# Patient Record
Sex: FEMALE | Race: WHITE | Employment: OTHER | URBAN - METROPOLITAN AREA
[De-identification: names, ages, dates, MRNs, and addresses within clinical notes are randomized per-mention and may not be internally consistent; named-entity substitution may affect disease eponyms.]

---

## 2017-01-01 ENCOUNTER — HOSPITAL ENCOUNTER (EMERGENCY)
Age: 72
Discharge: HOME | End: 2017-01-01
Attending: EMERGENCY MEDICINE
Payer: MEDICARE

## 2017-01-01 VITALS
HEIGHT: 60 IN | TEMPERATURE: 97.7 F | RESPIRATION RATE: 17 BRPM | HEART RATE: 87 BPM | DIASTOLIC BLOOD PRESSURE: 82 MMHG | BODY MASS INDEX: 27.68 KG/M2 | WEIGHT: 141 LBS | OXYGEN SATURATION: 98 % | SYSTOLIC BLOOD PRESSURE: 153 MMHG

## 2017-01-01 DIAGNOSIS — K64.4 EXTERNAL HEMORRHOID: Primary | ICD-10-CM

## 2017-01-01 LAB
ANION GAP BLD CALC-SCNC: 9 MMOL/L (ref 3–18)
BASOPHILS # BLD AUTO: 0 K/UL (ref 0–0.06)
BASOPHILS # BLD: 1 % (ref 0–2)
BUN SERPL-MCNC: 18 MG/DL (ref 7–18)
BUN/CREAT SERPL: 24 (ref 12–20)
CALCIUM SERPL-MCNC: 9.6 MG/DL (ref 8.5–10.1)
CHLORIDE SERPL-SCNC: 103 MMOL/L (ref 100–108)
CO2 SERPL-SCNC: 29 MMOL/L (ref 21–32)
CREAT SERPL-MCNC: 0.74 MG/DL (ref 0.6–1.3)
DIFFERENTIAL METHOD BLD: ABNORMAL
EOSINOPHIL # BLD: 0.2 K/UL (ref 0–0.4)
EOSINOPHIL NFR BLD: 4 % (ref 0–5)
ERYTHROCYTE [DISTWIDTH] IN BLOOD BY AUTOMATED COUNT: 12.8 % (ref 11.6–14.5)
GLUCOSE SERPL-MCNC: 88 MG/DL (ref 74–99)
HCT VFR BLD AUTO: 44.5 % (ref 35–45)
HGB BLD-MCNC: 14.6 G/DL (ref 12–16)
LYMPHOCYTES # BLD AUTO: 19 % (ref 21–52)
LYMPHOCYTES # BLD: 1 K/UL (ref 0.9–3.6)
MCH RBC QN AUTO: 30.4 PG (ref 24–34)
MCHC RBC AUTO-ENTMCNC: 32.8 G/DL (ref 31–37)
MCV RBC AUTO: 92.7 FL (ref 74–97)
MONOCYTES # BLD: 0.5 K/UL (ref 0.05–1.2)
MONOCYTES NFR BLD AUTO: 9 % (ref 3–10)
NEUTS SEG # BLD: 3.7 K/UL (ref 1.8–8)
NEUTS SEG NFR BLD AUTO: 67 % (ref 40–73)
PLATELET # BLD AUTO: 189 K/UL (ref 135–420)
PMV BLD AUTO: 10.9 FL (ref 9.2–11.8)
POTASSIUM SERPL-SCNC: 4.6 MMOL/L (ref 3.5–5.5)
RBC # BLD AUTO: 4.8 M/UL (ref 4.2–5.3)
SODIUM SERPL-SCNC: 141 MMOL/L (ref 136–145)
WBC # BLD AUTO: 5.5 K/UL (ref 4.6–13.2)

## 2017-01-01 PROCEDURE — 80048 BASIC METABOLIC PNL TOTAL CA: CPT | Performed by: EMERGENCY MEDICINE

## 2017-01-01 PROCEDURE — 99282 EMERGENCY DEPT VISIT SF MDM: CPT

## 2017-01-01 PROCEDURE — 85025 COMPLETE CBC W/AUTO DIFF WBC: CPT | Performed by: EMERGENCY MEDICINE

## 2017-01-01 NOTE — DISCHARGE INSTRUCTIONS

## 2017-01-01 NOTE — ED PROVIDER NOTES
HPI Comments: Pt has hx of similar episodes in past, diagnosed with hemorrhoids. States she has discussed surgery to remove these due to recurrent bleeding and pain with PCP. Has appointment scheduled with . Jacqui James MD  for Tuesday. Denies blood in stool. No CP, SOB, dizziness, headache, abdominal pain, NVD, fever, chills. Saroj Hauser MD      Patient is a 66 y.o. female presenting with anal bleeding. The history is provided by the patient. Rectal Bleeding    This is a recurrent problem. Episode onset: upon awakening. The stool is described as blood tinged. Pertinent negatives include no abdominal pain, no flatus, no dysuria, no abdominal distention, no chills, no fever, no nausea, no back pain, no vomiting, no diarrhea and no constipation. She has tried nothing for the symptoms. Past Medical History:   Diagnosis Date    Anxiety     Arthropathy     Atrophic vaginitis     Back pain     Chest pain, atypical     Cystocele     Depression     Flank pain     HTN (hypertension)     Hypercholesteremia     Hyperlipidemia     Leg pain     Metabolic disease     Mitral valve prolapse      with mitral regurgitation    Osteopenia     Recurrent UTI     right heel pain     Uterine prolapse     UTI (urinary tract infection)        Past Surgical History:   Procedure Laterality Date    Hx breast biopsy  ? 2010     Lt breast    Hx cataract removal Right 2014         Family History:   Problem Relation Age of Onset    Heart Disease Mother     Cancer Father     Heart Disease Brother     Heart Disease Sister        Social History     Social History    Marital status:      Spouse name: N/A    Number of children: N/A    Years of education: N/A     Occupational History    Not on file.      Social History Main Topics    Smoking status: Never Smoker    Smokeless tobacco: Never Used    Alcohol use No    Drug use: No    Sexual activity: No     Other Topics Concern    Not on file     Social History Narrative         ALLERGIES: Review of patient's allergies indicates no known allergies. Review of Systems   Constitutional: Negative for activity change, appetite change, chills, diaphoresis, fatigue and fever. HENT: Negative. Respiratory: Negative for cough and shortness of breath. Cardiovascular: Negative for chest pain, palpitations and leg swelling. Gastrointestinal: Positive for anal bleeding. Negative for abdominal distention, abdominal pain, blood in stool, constipation, diarrhea, flatus, nausea and vomiting. Genitourinary: Negative for difficulty urinating, dyspareunia, dysuria, flank pain, frequency, hematuria, menstrual problem and pelvic pain. Musculoskeletal: Negative for back pain. Neurological: Negative for light-headedness and headaches. Vitals:    01/01/17 1219   BP: 153/82   Pulse: 87   Resp: 17   Temp: 97.7 °F (36.5 °C)   SpO2: 98%   Weight: 64 kg (141 lb)   Height: 5' (1.524 m)            Physical Exam   Constitutional: She appears well-developed and well-nourished. HENT:   Head: Normocephalic and atraumatic. Normal skin turgor, cap refill, oral mucosa wet. Eyes: Conjunctivae and EOM are normal. Pupils are equal, round, and reactive to light. No scleral icterus. Neck: Normal range of motion. Neck supple. Cardiovascular: Normal rate, regular rhythm, normal heart sounds and intact distal pulses. Pulmonary/Chest: Effort normal and breath sounds normal. No respiratory distress. She has no wheezes. She has no rales. She exhibits no tenderness. Abdominal: Soft. Bowel sounds are normal. She exhibits no distension and no mass. There is no tenderness. There is no rebound and no guarding. Genitourinary: Vagina normal. Rectal exam shows external hemorrhoid. Rectal exam shows no internal hemorrhoid, no fissure, no mass, no tenderness and anal tone normal.   Genitourinary Comments: Hemoccult card negative.    Multiple external hemorrhoids appreciated, non thromboses, dried blood noted on one but no active bleeding or clots appreciated. Nursing note and vitals reviewed. MDM  Number of Diagnoses or Management Options  Diagnosis management comments: Impression: external hemorrhoids. PE shows external, non thrombosed hemorrhoids, otherwise unremarkable. No abdominal pain, no bleeding from rectum or in stool. CBC and CMP WNL. Pt is stable, vitals normal.  No symptoms at this time. Abdominal exam reassessed without change. Will d/c home. Pt has apt with PCP on Tuesday. Amount and/or Complexity of Data Reviewed  Clinical lab tests: ordered and reviewed  Tests in the medicine section of CPT®: reviewed and ordered      ED Course       Procedures           Diagnosis: No diagnosis found. Disposition: discharge home    Follow-up Information     None          Patient's Medications   Start Taking    No medications on file   Continue Taking    SERTRALINE (ZOLOFT) 100 MG TABLET    Take 50 mg by mouth daily.    These Medications have changed    No medications on file   Stop Taking    ESTRADIOL (ESTRACE) 0.01 % (0.1 MG/GRAM) VAGINAL CREAM    Use small amount on tip of finger, apply to introitus 3 times a week    OMEPRAZOLE (PRILOSEC) 20 MG CAPSULE

## 2017-01-01 NOTE — ED NOTES
Pt discharged to home ambulatory and in company of spouse  Discharge instructions provided via discussion and handout. Teaching to patient. Verbalized understanding. No questions voiced. Discharged with 0 RX.

## 2017-01-01 NOTE — ED TRIAGE NOTES
Pt c/o diarrhea followed by rectal bleeding since this morning. Hx of hemorrhoids.  \"but this time its bad\"

## 2017-05-16 ENCOUNTER — HOSPITAL ENCOUNTER (OUTPATIENT)
Dept: MAMMOGRAPHY | Age: 72
Discharge: HOME | End: 2017-05-16
Attending: INTERNAL MEDICINE
Payer: MEDICARE

## 2017-05-16 DIAGNOSIS — Z12.31 VISIT FOR SCREENING MAMMOGRAM: ICD-10-CM

## 2017-05-16 PROCEDURE — 77067 SCR MAMMO BI INCL CAD: CPT

## 2017-05-24 ENCOUNTER — HOSPITAL ENCOUNTER (OUTPATIENT)
Dept: GENERAL RADIOLOGY | Age: 72
Discharge: HOME | End: 2017-05-24
Attending: INTERNAL MEDICINE
Payer: MEDICARE

## 2017-05-24 DIAGNOSIS — Z13.820 OSTEOPOROSIS SCREENING: ICD-10-CM

## 2017-05-24 PROCEDURE — 77080 DXA BONE DENSITY AXIAL: CPT

## 2018-06-05 ENCOUNTER — HOSPITAL ENCOUNTER (OUTPATIENT)
Dept: MAMMOGRAPHY | Age: 73
Discharge: HOME | End: 2018-06-05
Attending: INTERNAL MEDICINE
Payer: MEDICARE

## 2018-06-05 DIAGNOSIS — Z12.31 VISIT FOR SCREENING MAMMOGRAM: ICD-10-CM

## 2018-06-05 PROCEDURE — 77063 BREAST TOMOSYNTHESIS BI: CPT

## 2018-09-10 ENCOUNTER — HOSPITAL ENCOUNTER (OUTPATIENT)
Dept: LAB | Age: 73
Discharge: HOME | End: 2018-09-10
Payer: MEDICARE

## 2018-09-10 ENCOUNTER — OFFICE VISIT (OUTPATIENT)
Dept: FAMILY MEDICINE CLINIC | Age: 73
End: 2018-09-10

## 2018-09-10 VITALS
HEART RATE: 81 BPM | HEIGHT: 61 IN | RESPIRATION RATE: 20 BRPM | BODY MASS INDEX: 26.39 KG/M2 | DIASTOLIC BLOOD PRESSURE: 77 MMHG | WEIGHT: 139.8 LBS | TEMPERATURE: 98 F | SYSTOLIC BLOOD PRESSURE: 135 MMHG | OXYGEN SATURATION: 95 %

## 2018-09-10 DIAGNOSIS — F41.9 ANXIETY: ICD-10-CM

## 2018-09-10 DIAGNOSIS — F41.9 ANXIETY: Primary | ICD-10-CM

## 2018-09-10 DIAGNOSIS — N81.11 CYSTOCELE, MIDLINE: ICD-10-CM

## 2018-09-10 DIAGNOSIS — E78.00 HYPERCHOLESTEREMIA: ICD-10-CM

## 2018-09-10 DIAGNOSIS — N39.0 UTI (URINARY TRACT INFECTION), UNCOMPLICATED: ICD-10-CM

## 2018-09-10 DIAGNOSIS — F33.9 RECURRENT DEPRESSION (HCC): ICD-10-CM

## 2018-09-10 DIAGNOSIS — R39.9 UTI SYMPTOMS: ICD-10-CM

## 2018-09-10 DIAGNOSIS — N39.0 RECURRENT UTI: ICD-10-CM

## 2018-09-10 LAB
ALBUMIN SERPL-MCNC: 4.1 G/DL (ref 3.4–5)
ALBUMIN/GLOB SERPL: 1 {RATIO} (ref 0.8–1.7)
ALP SERPL-CCNC: 92 U/L (ref 45–117)
ALT SERPL-CCNC: 27 U/L (ref 13–56)
ANION GAP SERPL CALC-SCNC: 8 MMOL/L (ref 3–18)
AST SERPL-CCNC: 18 U/L (ref 15–37)
BASOPHILS # BLD: 0 K/UL (ref 0–0.1)
BASOPHILS NFR BLD: 1 % (ref 0–2)
BILIRUB SERPL-MCNC: 1.6 MG/DL (ref 0.2–1)
BILIRUB UR QL STRIP: NORMAL
BUN SERPL-MCNC: 18 MG/DL (ref 7–18)
BUN/CREAT SERPL: 22 (ref 12–20)
CALCIUM SERPL-MCNC: 9.2 MG/DL (ref 8.5–10.1)
CHLORIDE SERPL-SCNC: 105 MMOL/L (ref 100–108)
CHOLEST SERPL-MCNC: 259 MG/DL
CO2 SERPL-SCNC: 29 MMOL/L (ref 21–32)
CREAT SERPL-MCNC: 0.82 MG/DL (ref 0.6–1.3)
DIFFERENTIAL METHOD BLD: ABNORMAL
EOSINOPHIL # BLD: 0.3 K/UL (ref 0–0.4)
EOSINOPHIL NFR BLD: 4 % (ref 0–5)
ERYTHROCYTE [DISTWIDTH] IN BLOOD BY AUTOMATED COUNT: 13.9 % (ref 11.6–14.5)
GLOBULIN SER CALC-MCNC: 4 G/DL (ref 2–4)
GLUCOSE SERPL-MCNC: 86 MG/DL (ref 74–99)
GLUCOSE UR-MCNC: NEGATIVE MG/DL
HCT VFR BLD AUTO: 45.9 % (ref 35–45)
HDLC SERPL-MCNC: 79 MG/DL (ref 40–60)
HDLC SERPL: 3.3 {RATIO} (ref 0–5)
HGB BLD-MCNC: 14.8 G/DL (ref 12–16)
KETONES P FAST UR STRIP-MCNC: NORMAL MG/DL
LDLC SERPL CALC-MCNC: 153.8 MG/DL (ref 0–100)
LIPID PROFILE,FLP: ABNORMAL
LYMPHOCYTES # BLD: 1.3 K/UL (ref 0.9–3.6)
LYMPHOCYTES NFR BLD: 20 % (ref 21–52)
MCH RBC QN AUTO: 30.1 PG (ref 24–34)
MCHC RBC AUTO-ENTMCNC: 32.2 G/DL (ref 31–37)
MCV RBC AUTO: 93.5 FL (ref 74–97)
MONOCYTES # BLD: 0.6 K/UL (ref 0.05–1.2)
MONOCYTES NFR BLD: 8 % (ref 3–10)
NEUTS SEG # BLD: 4.5 K/UL (ref 1.8–8)
NEUTS SEG NFR BLD: 67 % (ref 40–73)
PH UR STRIP: 6.5 [PH] (ref 4.6–8)
PLATELET # BLD AUTO: 234 K/UL (ref 135–420)
PMV BLD AUTO: 11.4 FL (ref 9.2–11.8)
POTASSIUM SERPL-SCNC: 4.7 MMOL/L (ref 3.5–5.5)
PROT SERPL-MCNC: 8.1 G/DL (ref 6.4–8.2)
PROT UR QL STRIP: NORMAL
RBC # BLD AUTO: 4.91 M/UL (ref 4.2–5.3)
SODIUM SERPL-SCNC: 142 MMOL/L (ref 136–145)
SP GR UR STRIP: 1.02 (ref 1–1.03)
TRIGL SERPL-MCNC: 131 MG/DL (ref ?–150)
TSH SERPL DL<=0.05 MIU/L-ACNC: 0.85 UIU/ML (ref 0.36–3.74)
UA UROBILINOGEN AMB POC: NORMAL (ref 0.2–1)
URINALYSIS CLARITY POC: NORMAL
URINALYSIS COLOR POC: YELLOW
URINE BLOOD POC: NORMAL
URINE LEUKOCYTES POC: NORMAL
URINE NITRITES POC: NEGATIVE
VLDLC SERPL CALC-MCNC: 26.2 MG/DL
WBC # BLD AUTO: 6.7 K/UL (ref 4.6–13.2)

## 2018-09-10 PROCEDURE — 85025 COMPLETE CBC W/AUTO DIFF WBC: CPT | Performed by: INTERNAL MEDICINE

## 2018-09-10 PROCEDURE — 80053 COMPREHEN METABOLIC PANEL: CPT | Performed by: INTERNAL MEDICINE

## 2018-09-10 PROCEDURE — 87086 URINE CULTURE/COLONY COUNT: CPT | Performed by: INTERNAL MEDICINE

## 2018-09-10 PROCEDURE — 80061 LIPID PANEL: CPT | Performed by: INTERNAL MEDICINE

## 2018-09-10 PROCEDURE — 36415 COLL VENOUS BLD VENIPUNCTURE: CPT | Performed by: INTERNAL MEDICINE

## 2018-09-10 PROCEDURE — 84443 ASSAY THYROID STIM HORMONE: CPT | Performed by: INTERNAL MEDICINE

## 2018-09-10 RX ORDER — CIPROFLOXACIN 250 MG/1
250 TABLET, FILM COATED ORAL EVERY 12 HOURS
Qty: 10 TAB | Refills: 0 | Status: SHIPPED | OUTPATIENT
Start: 2018-09-10 | End: 2018-09-15

## 2018-09-10 NOTE — PROGRESS NOTES
1. Have you been to the ER, urgent care clinic since your last visit? Hospitalized since your last visit? No 
 
2. Have you seen or consulted any other health care providers outside of the [address] [place] [address] since your last visit? Include any pap smears or colon screening.  No

## 2018-09-10 NOTE — MR AVS SNAPSHOT
[facility] 
 
 
 [address] [facility] [address] [facility] [address] [address] 
[phone] Patient: Derrell Eastman MRN: [id number] ZSZ:[id number] Visit Information Date & Time Provider Department Dept. Phone Encounter #  
 9/10/2018 12:30 PM Aiden Kruger, [address] [facility] [phone] [id number] Follow-up Instructions Return in about 3 months (around 12/10/2018) for rov. Your Appointments 9/27/2018 11:00 AM  
ESTABLISHED PATIENT with Power Shelton NP [facility] ([address]) Appt Note: Return in about 4 months (around 9/30/2018) for with NP [place] for pessary change. [address] [place] [id number]  
[phone]  
  
   
 [facility] [id number] Upcoming Health Maintenance Date Due DTaP/Tdap/Td series (1 - Tdap) 8/5/1964 ZOSTER VACCINE AGE 60> 6/5/2003 GLAUCOMA SCREENING Q2Y 8/5/2008 Pneumococcal 65+ Low/Medium Risk (1 of 2 - PCV13) 8/5/2008 MEDICARE YEARLY EXAM 3/14/2018 Influenza Age 5 to Adult 8/1/2018 Allergies as of 9/10/2018  Review Complete On: 9/10/2018 By: Aiden Kruger MD  
  
 Severity Noted Reaction Type Reactions Nitrofurantoin Medium 10/03/2016    Hives, Rash Current Immunizations  Never Reviewed No immunizations on file. Not reviewed this visit You Were Diagnosed With   
  
 Codes Comments Anxiety    -  Primary ICD-10-CM: F41.9 ICD-9-CM: 300.00 Recurrent UTI     ICD-10-CM: N39.0 ICD-9-CM: 599.0 Cystocele, midline     ICD-10-CM: N81.11 
ICD-9-CM: 618.01   
 UTI symptoms     ICD-10-CM: R39.9 ICD-9-CM: 788.99   
 UTI (urinary tract infection), uncomplicated     ELENA-38-EU: N39.0 ICD-9-CM: 599.0 Recurrent depression ([address]     ICD-10-CM: F33.9 ICD-9-CM: 296.30 Hypercholesteremia     ICD-10-CM: E78.00 ICD-9-CM: 272.0 Vitals BP Pulse Temp Resp Height(growth percentile) Weight(growth percentile) 135/77 81 98 °F (36.7 °C) (Oral) 20 5' 0.5\" (1.537 m) 139 lb 12.8 oz (63.4 kg) SpO2 BMI OB Status Smoking Status 95% 26.85 kg/m2 Postmenopausal Never Smoker BMI and BSA Data Body Mass Index Body Surface Area  
 26.85 kg/m 2 1.65 m 2 Preferred Pharmacy Pharmacy Name Phone [facility] [address] [place] [address]. [address] [phone] Your Updated Medication List  
  
   
This list is accurate as of 9/10/18  1:06 PM.  Always use your most recent med list.  
  
  
  
  
 ALPRAZolam 0.25 mg tablet Commonly known as:  [place] Take  by mouth. Cholecalciferol (Vitamin D3) 2,000 unit Cap capsule Commonly known as:  VITAMIN D3 Take  by mouth. ciprofloxacin HCl 250 mg tablet Commonly known as:  CIPRO Take 1 Tab by mouth every twelve (12) hours for 5 days. CLARITIN 10 mg tablet Generic drug:  loratadine Take  by mouth. CRANBERRY PO Take  by Mouth.  
  
 cyanocobalamin 1,000 mcg tablet Take  by mouth. fluticasone 50 mcg/actuation nasal spray Commonly known as:  FLONASE  
by Nasal route. ZOLOFT 100 mg tablet Generic drug:  sertraline Take 50 mg by mouth daily. Prescriptions Sent to Pharmacy Refills  
 ciprofloxacin HCl (CIPRO) 250 mg tablet 0 Sig: Take 1 Tab by mouth every twelve (12) hours for 5 days. Class: Normal  
 Pharmacy: [facility] Ph #: [phone] Route: Oral  
  
We Performed the Following AMB POC URINALYSIS DIP STICK AUTO W/O MICRO [24887 CPT(R)] Follow-up Instructions Return in about 3 months (around 12/10/2018) for rov. To-Do List   
 09/10/2018 Lab:  CBC WITH AUTOMATED DIFF   
  
 09/10/2018 Microbiology:  CULTURE, URINE   
  
 09/10/2018 Lab: LIPID PANEL   
  
 09/10/2018 Lab:  METABOLIC PANEL, COMPREHENSIVE   
  
 09/10/2018 Lab:  TSH 3RD GENERATION Introducing [facility] SERVICES! Dear Chavez Stanford: 
Thank you for requesting a [email or web address] account. Our records indicate that you already have an active [email or web address] account. You can access your account anytime at [email or web address] Did you know that you can access your hospital and ER discharge instructions at any time in [email or web address]? You can also review all of your test results from your hospital stay or ER visit. Additional Information If you have questions, please visit the Frequently Asked Questions section of the [email or web address] website at [email or web address] Remember, [email or web address] is NOT to be used for urgent needs. For medical emergencies, dial 911. Now available from your iPhone and Android! Please provide this summary of care documentation to your next provider. Your primary care clinician is listed as Ashanti Cedillo. If you have any questions after today's visit, please call [phone].

## 2018-09-11 DIAGNOSIS — E78.00 HYPERCHOLESTEREMIA: Primary | ICD-10-CM

## 2018-09-11 RX ORDER — ATORVASTATIN CALCIUM 10 MG/1
10 TABLET, FILM COATED ORAL
Qty: 90 TAB | Refills: 0 | Status: SHIPPED | OUTPATIENT
Start: 2018-09-11 | End: 2018-12-11 | Stop reason: SINTOL

## 2018-09-12 LAB
BACTERIA SPEC CULT: NORMAL
SERVICE CMNT-IMP: NORMAL

## 2018-11-27 DIAGNOSIS — F41.9 ANXIETY: Primary | ICD-10-CM

## 2018-11-27 RX ORDER — ALPRAZOLAM 0.25 MG/1
0.25 TABLET ORAL
Qty: 60 TAB | Refills: 0 | Status: SHIPPED | OUTPATIENT
Start: 2018-11-27 | End: 2019-03-05 | Stop reason: SDUPTHER

## 2018-12-11 ENCOUNTER — OFFICE VISIT (OUTPATIENT)
Dept: FAMILY MEDICINE CLINIC | Age: 73
End: 2018-12-11

## 2018-12-11 VITALS
RESPIRATION RATE: 20 BRPM | OXYGEN SATURATION: 96 % | BODY MASS INDEX: 23.53 KG/M2 | DIASTOLIC BLOOD PRESSURE: 78 MMHG | SYSTOLIC BLOOD PRESSURE: 135 MMHG | HEIGHT: 65 IN | HEART RATE: 72 BPM | TEMPERATURE: 98 F | WEIGHT: 141.2 LBS

## 2018-12-11 DIAGNOSIS — J30.89 NON-SEASONAL ALLERGIC RHINITIS, UNSPECIFIED TRIGGER: ICD-10-CM

## 2018-12-11 DIAGNOSIS — E78.00 HYPERCHOLESTEREMIA: ICD-10-CM

## 2018-12-11 DIAGNOSIS — F41.9 ANXIETY: Primary | ICD-10-CM

## 2018-12-11 DIAGNOSIS — R30.0 DYSURIA: ICD-10-CM

## 2018-12-11 LAB
BILIRUB UR QL STRIP: NEGATIVE
GLUCOSE UR-MCNC: NEGATIVE MG/DL
KETONES P FAST UR STRIP-MCNC: NEGATIVE MG/DL
PH UR STRIP: 5.5 [PH] (ref 4.6–8)
PROT UR QL STRIP: NEGATIVE
SP GR UR STRIP: 1.02 (ref 1–1.03)
UA UROBILINOGEN AMB POC: NORMAL (ref 0.2–1)
URINALYSIS CLARITY POC: CLEAR
URINALYSIS COLOR POC: YELLOW
URINE BLOOD POC: NORMAL
URINE LEUKOCYTES POC: NORMAL
URINE NITRITES POC: NEGATIVE

## 2018-12-11 RX ORDER — EZETIMIBE 10 MG/1
10 TABLET ORAL DAILY
Qty: 90 TAB | Refills: 1 | Status: SHIPPED | OUTPATIENT
Start: 2018-12-11 | End: 2019-06-11 | Stop reason: SDUPTHER

## 2018-12-11 RX ORDER — AZELASTINE 1 MG/ML
1 SPRAY, METERED NASAL 2 TIMES DAILY
Qty: 1 BOTTLE | Refills: 0 | Status: SHIPPED | OUTPATIENT
Start: 2018-12-11 | End: 2021-08-17

## 2018-12-11 NOTE — PROGRESS NOTES
1. Have you been to the ER, urgent care clinic since your last visit? Hospitalized since your last visit? No 
 
2. Have you seen or consulted any other health care providers outside of the [address] [place] [address] since your last visit? Include any pap smears or colon screening.  No

## 2018-12-11 NOTE — PROGRESS NOTES
History of Present Illness Ashlyn Copeland is a 74 y.o. female who presents today for management of 
 
Chief Complaint Patient presents with  Cholesterol Problem  Anxiety  Urinary Hesitancy Cardiovascular Review: 
The patient has hyperlipidemia. Diet and Lifestyle: generally follows a low fat low cholesterol diet, generally follows a low sodium diet, sedentary, nonsmoker Home BP Monitoring: is not measured at home. Pertinent ROS: taking medications as instructed, no medication side effects noted, no TIA's, no dyspnea on exertion, no swelling of ankles. Anxiety Review: 
Patient is seen for anxiety disorder. Current treatment includes Zoloft, Xanax and no other therapies. Ongoing symptoms include: none. Patient denies: palpitations, sweating, chest pain, shortness of breath, dizziness, insomnia, racing thoughts. Reported side effects from the treatment: none. Patient complains of vulvar irritation and discomfort. She was using a pessary which accidentally came out last week. She also complains of nasal congestion and sneezing for one week. It is associated with itchy, watery eyes and facial pressure. She has been taking OTC loratadine with no relief. Problem List 
Patient Active Problem List  
 Diagnosis Date Noted  Hypercholesteremia 09/11/2018  Cystocele, midline 09/10/2018  Recurrent depression ([address] 09/10/2018  Uterine prolapse  Recurrent UTI  Anxiety Past Medical History Past Medical History:  
Diagnosis Date  Allergic rhinitis  Anxiety  Arthropathy  Atrophic vaginitis  Back pain  Chest pain, atypical   
 Cystocele  Depression  Flank pain  HTN (hypertension)  Hypercholesteremia  Hyperlipidemia  Leg pain  Menopause  Metabolic disease  Mitral valve prolapse   
 with mitral regurgitation  Osteopenia  Recurrent UTI  right heel pain  Uterine prolapse  UTI (urinary tract infection) Surgical History Past Surgical History:  
Procedure Laterality Date  HX BREAST BIOPSY Left ? 2010 Benign  HX CATARACT REMOVAL Right 2014 Current Medications Current Outpatient Medications Medication Sig  
 ezetimibe (ZETIA) 10 mg tablet Take 1 Tab by mouth daily.  azelastine (ASTELIN) 137 mcg (0.1 %) nasal spray 1 [place] by Both Nostrils route two (2) times a day. Use in each nostril as directed  ALPRAZolam (XANAX) 0.25 mg tablet Take 1 Tab by mouth daily as needed for Anxiety.  cyanocobalamin 1,000 mcg tablet Take  by mouth.  fluticasone (FLONASE) 50 mcg/actuation nasal spray by Nasal route.  loratadine (CLARITIN) 10 mg tablet Take  by mouth.  Cholecalciferol, Vitamin D3, (VITAMIN D3) 2,000 unit cap capsule Take  by mouth.  cranberry fruit extract (CRANBERRY PO) Take  by Mouth.  sertraline (ZOLOFT) 100 mg tablet Take 50 mg by mouth daily. No current facility-administered medications for this visit. Allergies/Drug Reactions Allergies Allergen Reactions  Nitrofurantoin Hives and Rash  Atorvastatin Myalgia Family History Family History Problem Relation Age of Onset  Heart Disease Mother  Cancer Father  Heart Disease Brother  Heart Disease Sister Social History Social History Socioeconomic History  Marital status:  Spouse name: Not on file  Number of children: Not on file  Years of education: Not on file  Highest education level: Not on file Social Needs  Financial resource strain: Not on file  Food insecurity - worry: Not on file  Food insecurity - inability: Not on file  Transportation needs - medical: Not on file  Transportation needs - non-medical: Not on file Occupational History  Not on file Tobacco Use  Smoking status: Never Smoker  Smokeless tobacco: Never Used Substance and Sexual Activity  Alcohol use:  No  
 Alcohol/week: 0.0 oz  Drug use: No  
 Sexual activity: No  
Other Topics Concern  Not on file Social History Narrative  Not on file Review of Systems Negative except as mentioned in HPI Physical Exam 
Vital signs:  
Vitals:  
 12/11/18 1109 BP: 135/78 Pulse: 72 Resp: 20 Temp: 98 °F (36.7 °C) TempSrc: Oral  
SpO2: 96% Weight: 141 lb 3.2 oz (64 kg) Height: 5' 5\" (1.651 m) General: alert, oriented, not in distress Eyes: clear conjunctivae, anicteric sclerae, full and equal ROMs Chest/Lungs: clear breath sounds, no wheezing or crackles Heart: normal rate, regular rhythm, no murmur Extremities: no focal deformities, no edema Neuro: AAOx3, CN's grossly intact Skin: no visible abnormalities Laboratory/Tests: 
Component Latest Ref Rng & Units 9/10/2018 9/10/2018 9/10/2018 9/10/2018  
 
      1:12 PM  1:12 PM  1:12 PM  1:12 PM  
WBC 
    4.6 - 13.2 K/uL    6.7  
RBC 
    4.20 - 5.30 M/uL    4.91  
HGB 12.0 - 16.0 g/dL    14.8 HCT 
    35.0 - 45.0 %    45.9 (H) MCV 
    74.0 - 97.0 FL    93.5 MCH 
    24.0 - 34.0 PG    30.1 MCHC 31.0 - 37.0 g/dL    32.2 RDW 
    11.6 - 14.5 %    13.9 PLATELET 
    865 - 294 K/uL    234 MPV 
    9.2 - 11.8 FL    11.4 NEUTROPHILS 
    40 - 73 %    67 LYMPHOCYTES 
    21 - 52 %    20 (L) MONOCYTES 
    3 - 10 %    8 EOSINOPHILS 
    0 - 5 %    4 BASOPHILS 
    0 - 2 %    1  
ABS. NEUTROPHILS 
    1.8 - 8.0 K/UL    4.5  
ABS. LYMPHOCYTES 
    0.9 - 3.6 K/UL    1.3  
ABS. MONOCYTES 
    0.05 - 1.2 K/UL    0.6  
ABS. EOSINOPHILS 
    0.0 - 0.4 K/UL    0.3  
ABS. BASOPHILS 
    0.0 - 0.1 K/UL    0.0  
DF AUTOMATED Sodium 136 - 145 mmol/L  142 Potassium 3.5 - 5.5 mmol/L  4.7 Chloride 100 - 108 mmol/L  105 CO2 
    21 - 32 mmol/L  29 Anion gap 3.0 - 18 mmol/L  8 Glucose 74 - 99 mg/dL  86 BUN 
    7.0 - 18 MG/DL  18 Creatinine 0.6 - 1.3 MG/DL  0.82    
BUN/Creatinine ratio 12 - 20    22 (H) GFR est AA 
    >60 ml/min/1.73m2  >60    
GFR est non-AA 
    >60 ml/min/1.73m2  >60 Calcium 8.5 - 10.1 MG/DL  9.2 Bilirubin, total 
    0.2 - 1.0 MG/DL  1.6 (H) ALT (SGPT) 13 - 56 U/L  27 AST 
    15 - 37 U/L  18 Alk. phosphatase 45 - 117 U/L  92 Protein, total 
    6.4 - 8.2 g/dL  8.1 Albumin 3.4 - 5.0 g/dL  4.1 Globulin 2.0 - 4.0 g/dL  4.0 A-G Ratio 
    0.8 - 1.7    1.0 Cholesterol, total 
    <200 MG/DL   259 (H) Triglyceride 
    <150 MG/DL   131 HDL Cholesterol 40 - 60 MG/DL   79 (H) LDL, calculated 0 - 100 MG/DL   153.8 (H) VLDL, calculated MG/DL   26.2 CHOL/HDL Ratio 0 - 5.0     3.3 TSH 
    0.36 - 3.74 uIU/mL 0.85 Assessment/Plan: 1. Hypercholesterolemia - Patient developed myalgia while taking atorvastatin - switch to Zetia 
  
2. Anxiety 
- stable 
- continue Xanax as needed 
  
3. Dysuria, h/o Recurrent UTI 
- POC Urinalysis - trace blood, trace leucocytes 
  
4. Cystocele, midline 
- urogynecology follow-up 
  
5. Recurrent depression ([address] 
- stable 
- continue Zoloft 6. Allergic rhinitis 
- may take loratadine 10mg BID 
- asteline nasal spray 
- continue Flonase Follow-up Disposition: 
Return in about 6 months (around 6/11/2019) for rov. I have discussed the diagnosis with the patient and the intended plan as seen in the above orders. The patient has received an after-visit summary and questions were answered concerning future plans. I have discussed medication side effects and warnings with the patient as well. I have reviewed the plan of care with the patient, accepted their input and they are in agreement with the treatment goals. Rula Chirinos MD 
December 11, 2018

## 2019-02-19 ENCOUNTER — OFFICE VISIT (OUTPATIENT)
Dept: FAMILY MEDICINE CLINIC | Age: 74
End: 2019-02-19

## 2019-02-19 VITALS
RESPIRATION RATE: 20 BRPM | BODY MASS INDEX: 23.89 KG/M2 | WEIGHT: 143.4 LBS | OXYGEN SATURATION: 98 % | HEIGHT: 65 IN | TEMPERATURE: 97.5 F | DIASTOLIC BLOOD PRESSURE: 74 MMHG | HEART RATE: 72 BPM | SYSTOLIC BLOOD PRESSURE: 134 MMHG

## 2019-02-19 DIAGNOSIS — M54.6 ACUTE LEFT-SIDED THORACIC BACK PAIN: Primary | ICD-10-CM

## 2019-02-19 RX ORDER — LIDOCAINE 50 MG/G
PATCH TOPICAL
Qty: 20 EACH | Refills: 0 | Status: SHIPPED | OUTPATIENT
Start: 2019-02-19 | End: 2021-08-17

## 2019-02-19 NOTE — PROGRESS NOTES
1. Have you been to the ER, urgent care clinic since your last visit? Hospitalized since your last visit? No 
 
2. Have you seen or consulted any other health care providers outside of the [address] [place] [address] since your last visit? Include any pap smears or colon screening.  No

## 2019-02-19 NOTE — PROGRESS NOTES
History of Present Illness Adrienne Rascon is a 74 y.o. female who presents today for management of 
 
Chief Complaint Patient presents with  Back Pain Chest Wall Pain Patient presents for presents evaluation of chest wall pain. Pain is localized to bilateral costophrenic angles radiating to the left chest, left shoulder and left thoracic back. Onset was 2 weeks ago. Pain description: character of chest pain: aching 
severity = fairly severe 
course since onset: waxing and waning but worse overall 
denies shortness of breath, hemoptysis and anginal type chest pain. Mechanism of injury: none known. Previous visits for this problem: none. Evaluation to date: none  Treatment to date: Tylenol 250mg with minimal relief. Problem List 
Patient Active Problem List  
 Diagnosis Date Noted  Hypercholesteremia 09/11/2018  Cystocele, midline 09/10/2018  Recurrent depression [address] [address] 09/10/2018  Uterine prolapse  Recurrent UTI  Anxiety Past Medical History Past Medical History:  
Diagnosis Date  Allergic rhinitis  Anxiety  Arthropathy  Atrophic vaginitis  Back pain  Chest pain, atypical   
 Cystocele  Depression  Flank pain  HTN (hypertension)  Hypercholesteremia  Hyperlipidemia  Leg pain  Menopause  Metabolic disease  Mitral valve prolapse   
 with mitral regurgitation  Osteopenia  Recurrent UTI  right heel pain  Uterine prolapse  UTI (urinary tract infection) Surgical History Past Surgical History:  
Procedure Laterality Date  HX BREAST BIOPSY Left ? 2010 Benign  HX CATARACT REMOVAL Right 2014 Current Medications Current Outpatient Medications Medication Sig  
 lidocaine (LIDODERM) 5 % Apply patch to the affected area for 12 hours a day and remove for 12 hours a day.  ezetimibe (ZETIA) 10 mg tablet Take 1 Tab by mouth daily.  azelastine (ASTELIN) 137 mcg (0.1 %) nasal spray 1 [place] by Both Nostrils route two (2) times a day. Use in each nostril as directed  ALPRAZolam (XANAX) 0.25 mg tablet Take 1 Tab by mouth daily as needed for Anxiety.  cyanocobalamin 1,000 mcg tablet Take  by mouth.  fluticasone (FLONASE) 50 mcg/actuation nasal spray by Nasal route.  loratadine (CLARITIN) 10 mg tablet Take  by mouth.  Cholecalciferol, Vitamin D3, (VITAMIN D3) 2,000 unit cap capsule Take  by mouth.  cranberry fruit extract (CRANBERRY PO) Take  by Mouth.  sertraline (ZOLOFT) 100 mg tablet Take 50 mg by mouth daily. No current facility-administered medications for this visit. Allergies/Drug Reactions Allergies Allergen Reactions  Nitrofurantoin Hives and Rash  Atorvastatin Myalgia Family History Family History Problem Relation Age of Onset  Heart Disease Mother  Cancer Father  Heart Disease Brother  Heart Disease Sister Social History Social History Socioeconomic History  Marital status:  Spouse name: Not on file  Number of children: Not on file  Years of education: Not on file  Highest education level: Not on file Social Needs  Financial resource strain: Not on file  Food insecurity - worry: Not on file  Food insecurity - inability: Not on file  Transportation needs - medical: Not on file  Transportation needs - non-medical: Not on file Occupational History  Not on file Tobacco Use  Smoking status: Never Smoker  Smokeless tobacco: Never Used Substance and Sexual Activity  Alcohol use: No  
  Alcohol/week: 0.0 oz  Drug use: No  
 Sexual activity: No  
Other Topics Concern  Not on file Social History Narrative  Not on file Review of Systems Negative except as mentioned in HPI Physical Exam 
Vital signs:  
Vitals:  
 02/19/19 1041 BP: 134/74 Pulse: 72 Resp: 20 Temp: 97.5 °F (36.4 °C) TempSrc: Oral  
SpO2: 98% Weight: 143 lb 6.4 oz (65 kg) Height: 5' 5\" (1.651 m) General: alert, oriented, not in distress Eyes: clear conjunctivae, anicteric sclerae, full and equal ROMs Chest/Lungs: clear breath sounds, no wheezing or crackles, no chest wall tenderness Heart: normal rate, regular rhythm, no murmur Extremities: no focal deformities, no edema Cervical: Neck is midline. Normal muscle tone. No focal atrophy is noted. ROM pain free. Thoracic: No rash, ecchymosis, or gross obliquity. No fasciculations. No focal atrophy is noted. (+) tenderness to palpation over left scapular area Musculoskeletal: strength 5/5 on both upper and lower extremities. Sensation in the bilateral arms grossly intact to light touch. Sensation in the bilateral legs grossly intact to light touch. Neuro: AAOx3, CN's grossly intact Skin: no visible abnormalities Assessment/Plan: ICD-10-CM ICD-9-CM 1. Acute left-sided thoracic back pain M54.6 724.1 lidocaine (LIDODERM) 5 % Home exercises Tylenol 500-1000mg Q8 PRN for pain Avoid strenuous exercises Heat Follow-up Disposition: 
Return if symptoms worsen or fail to improve. I have discussed the diagnosis with the patient and the intended plan as seen in the above orders. The patient has received an after-visit summary and questions were answered concerning future plans. I have discussed medication side effects and warnings with the patient as well. I have reviewed the plan of care with the patient, accepted their input and they are in agreement with the treatment goals. Ruperto Cardoza MD 
February 19, 2019

## 2019-02-19 NOTE — PATIENT INSTRUCTIONS
Shoulder Blade: Exercises Your Care Instructions Here are some examples of typical exercises for your condition. Start each exercise slowly. Ease off the exercise if you start to have pain. Your doctor or physical therapist will tell you when you can start these exercises and which ones will work best for you. How to do the exercises Shoulder roll 1. Stand tall with your chin slightly tucked. Imagine that a string at the top of your head is pulling you straight up. 2. Keep your arms relaxed. All motion will be in your shoulders. 3. Shrug your shoulders up toward your ears, then up and back. [place] your shoulders down and back, like you're sliding your hands down into your back pants pockets. 4. Repeat the circles at least 2 to 4 times. 5. This exercise is also helpful anytime you want to relax. Lower neck and upper back stretch 1. With your arms about shoulder height, clasp your hands in front of you. 2. Drop your chin toward your chest. 
3. Reach straight forward so you are rounding your upper back. Think about pulling your shoulder blades apart. Mayra Urban feel a stretch across your upper back and shoulders. Hold for at least 6 seconds. 4. Repeat 2 to 4 times. Triceps stretch 1. Reach your arm straight up. 2. Keeping your elbow in place, bend your arm and reach your hand down behind your back. 3. With your other hand, apply gentle pressure to the bent elbow. Mayra Urban feel a stretch at the back of your upper arm and shoulder. Hold about 6 seconds. 4. Repeat 2 to 4 times with each arm. Shoulder stretch 1. Relax your shoulders. 2. Raise one arm to shoulder height, and reach it across your chest. 
3. Pull the arm slightly toward you with your other arm. This will help you get a gentle stretch. Hold for about 6 seconds. 4. Repeat 2 to 4 times. Shoulder blade squeeze 1. Sit or stand up tall with your arms at your sides. 2. Keep your shoulders relaxed and down, not shrugged. 3. Squeeze your shoulder blades together. Hold for 6 seconds, then relax. 4. Repeat 8 to 12 times. Straight-arm shoulder blade squeeze 1. Sit or stand tall. Relax your shoulders. 2. With palms down, hold your elastic tubing or band straight out in front of you. 3. Start with slight tension in the tubing or band, with your hands about shoulder-width apart. 4. Slowly pull straight out to the sides, squeezing your shoulder blades together. Keep your arms straight and at shoulder height. Slowly release. 5. Repeat 8 to 12 times. Rowing 1. [place] your elastic tubing or band at about waist height. Take one end in each hand. 2. Sit or stand with your feet hip-width apart. 3. Hold your arms straight in front of you. Adjust your distance to create slight tension in the tubing or band. 4. Slightly tuck your chin. Relax your shoulders. 5. Without shrugging your shoulders, pull straight back. Your elbows will pass alongside your waist. 
 
Pull-downs 1. [place] your elastic tubing or band in the top of a closed door. Take one end in each hand. 2. Either sit or stand, depending on what is more comfortable. If you feel unsteady, sit on a chair. 3. Start with your arms up and comfortably apart, elbows straight. There should be a slight tension in the tubing or band. 4. Slightly tuck your chin, and look straight ahead. 5. Keeping your back straight, slowly pull down and back, bending your elbows. 6. Stop where your hands are level with your chin, in a \"goalpost\" position. 7. Repeat 8 to 12 times. Chest T stretch 1. Lie on your back. Raise your knees so they are bent. Plant your feet on the floor, hip-width apart. 2. Tuck your chin, and relax your shoulders. 3. Reach your arms straight out to the sides. If you don't feel a mild stretch in your shoulders and across your chest, use a foam roll or a tightly rolled blanket under your spine, from your tailbone to your head. 4. Relax in this position for at least 15 to 30 seconds while you breathe normally. Repeat 2 to 4 times. 5. As you get used to this stretch, keep adding a little more time until you are able relax in this position for 2 or 3 minutes. When you can relax for at least 2 minutes, you only need to do the exercise 1 time per session. Chest goalpost stretch 1. Lie on your back. Raise your knees so they are bent. Plant your feet on the floor, hip-width apart. 2. Tuck your chin, and relax your shoulders. 3. Reach your arms straight out to the sides. 4. Bend your arms at the elbows, with your hands pointed toward the top of your head. Your arms should make an L on either side of your head. Your palms should be facing up. 5. If you don't feel a mild stretch in your shoulders and across your chest, use a foam roll or tightly rolled blanket under your spine, from your tailbone to your head. 6. Relax in this position for at least 15 to 30 seconds while you breathe normally. Repeat 2 to 4 times. 7. Each day you do this exercise, add a little more time until you can relax in this position for 2 or 3 minutes. When you can relax for at least 2 minutes, you only need to do the exercise 1 time per session. Follow-up care is a key part of your treatment and safety. Be sure to make and go to all appointments, and call your doctor if you are having problems. It's also a good idea to know your test results and keep a list of the medicines you take. Where can you learn more? Go to http://alba-mariaelena.info/. Enter [phone] in the search box to learn more about \"Shoulder Blade: Exercises. \" Current as of: September 20, 2018 Content Version: 11.9 © 8655-0724 [employer]. Care instructions adapted under license by [employer] (which disclaims liability or warranty for this information).  If you have questions about a medical condition or this instruction, always ask your healthcare professional. [place] [address] any warranty or liability for your use of this information.

## 2019-03-05 ENCOUNTER — DOCUMENTATION ONLY (OUTPATIENT)
Dept: FAMILY MEDICINE CLINIC | Age: 74
End: 2019-03-05

## 2019-03-05 DIAGNOSIS — F41.9 ANXIETY: ICD-10-CM

## 2019-03-05 RX ORDER — ALPRAZOLAM 0.25 MG/1
TABLET ORAL
Qty: 60 TAB | Refills: 0 | Status: SHIPPED | OUTPATIENT
Start: 2019-03-05 | End: 2019-11-08 | Stop reason: SDUPTHER

## 2019-03-28 ENCOUNTER — OFFICE VISIT (OUTPATIENT)
Dept: FAMILY MEDICINE CLINIC | Age: 74
End: 2019-03-28

## 2019-03-28 VITALS
SYSTOLIC BLOOD PRESSURE: 137 MMHG | RESPIRATION RATE: 16 BRPM | BODY MASS INDEX: 23.99 KG/M2 | OXYGEN SATURATION: 97 % | WEIGHT: 144 LBS | DIASTOLIC BLOOD PRESSURE: 76 MMHG | HEART RATE: 80 BPM | HEIGHT: 65 IN | TEMPERATURE: 97.1 F

## 2019-03-28 DIAGNOSIS — J01.10 ACUTE NON-RECURRENT FRONTAL SINUSITIS: Primary | ICD-10-CM

## 2019-03-28 DIAGNOSIS — J02.9 SORE THROAT: ICD-10-CM

## 2019-03-28 LAB
S PYO AG THROAT QL: NEGATIVE
VALID INTERNAL CONTROL?: YES

## 2019-03-28 RX ORDER — FLUCONAZOLE 150 MG/1
150 TABLET ORAL DAILY
Qty: 1 TAB | Refills: 0 | Status: SHIPPED | OUTPATIENT
Start: 2019-03-28 | End: 2019-03-29

## 2019-03-28 RX ORDER — AMOXICILLIN 500 MG/1
500 CAPSULE ORAL 3 TIMES DAILY
Qty: 21 CAP | Refills: 0 | Status: SHIPPED | OUTPATIENT
Start: 2019-03-28 | End: 2019-04-04

## 2019-03-28 NOTE — PROGRESS NOTES
Chet Blade is a 74 y.o. female  Chief Complaint   Patient presents with    Allergies     1. Have you been to the ER, urgent care clinic since your last visit? Hospitalized since your last visit? No    2. Have you seen or consulted any other health care providers outside of the [address] [place] [address] since your last visit? Include any pap smears or colon screening.  No

## 2019-03-28 NOTE — PROGRESS NOTES
Jace Miller is a 74 y.o.  female and presents with    Chief Complaint   Patient presents with    Allergies    Sore Throat       Subjective  Ms. Cortez Hence presents today with complaints of sore throat, allergy symptoms, and right ear discomfort for the past 1-2 weeks. She denies recent sick contacts. She has some discomfort with swallowing. She has head pressure. She has been taking allergy medication but it does not seem to help. She denies fever and chills but reports sweats at night. She denies cough. She is going out of town next week and wants to make sure she is ok before leaving. Additional Concerns: No         Patient Active Problem List   Diagnosis Code    Anxiety F41.9    Recurrent UTI N39.0    Uterine prolapse N81.4    Cystocele, midline N81.11    Recurrent depression ([address] F33.9    Hypercholesteremia E78.00     Current Outpatient Medications   Medication Sig Dispense Refill    ALPRAZolam (XANAX) 0.25 mg tablet TAKE 1 TABLET BY MOUTH EVERY DAY AS NEEDED FOR ANXIETY 60 Tab 0    lidocaine (LIDODERM) 5 % Apply patch to the affected area for 12 hours a day and remove for 12 hours a day. 20 Each 0    ezetimibe (ZETIA) 10 mg tablet Take 1 Tab by mouth daily. 90 Tab 1    azelastine (ASTELIN) 137 mcg (0.1 %) nasal spray 1 [place] by Both Nostrils route two (2) times a day. Use in each nostril as directed 1 Bottle 0    cyanocobalamin 1,000 mcg tablet Take  by mouth.  fluticasone (FLONASE) 50 mcg/actuation nasal spray by Nasal route.  loratadine (CLARITIN) 10 mg tablet Take  by mouth.  Cholecalciferol, Vitamin D3, (VITAMIN D3) 2,000 unit cap capsule Take  by mouth.  cranberry fruit extract (CRANBERRY PO) Take  by Mouth.  sertraline (ZOLOFT) 100 mg tablet Take 50 mg by mouth daily.        Allergies   Allergen Reactions    Nitrofurantoin Hives and Rash    Atorvastatin Myalgia     Past Medical History:   Diagnosis Date    Allergic rhinitis     Anxiety     Arthropathy     Atrophic vaginitis     Back pain     Chest pain, atypical     Cystocele     Depression     Flank pain     HTN (hypertension)     Hypercholesteremia     Hyperlipidemia     Leg pain     Menopause     Metabolic disease     Mitral valve prolapse     with mitral regurgitation    Osteopenia     Recurrent UTI     right heel pain     Uterine prolapse     UTI (urinary tract infection)      Past Surgical History:   Procedure Laterality Date    HX BREAST BIOPSY Left ? 2010    Benign    HX CATARACT REMOVAL Right 2014     Family History   Problem Relation Age of Onset    Heart Disease Mother     Cancer Father     Heart Disease Brother     Heart Disease Sister      Social History     Tobacco Use    Smoking status: Never Smoker    Smokeless tobacco: Never Used   Substance Use Topics    Alcohol use: No     Alcohol/week: 0.0 oz       ROS   History obtained from the patient  General ROS: positive for  - night sweats  negative for - chills or fever  ENT ROS: positive for - sinus pain and sore throat  Respiratory ROS: no cough, shortness of breath, or wheezing  Cardiovascular ROS: no chest pain or dyspnea on exertion    All other systems reviewed and are negative.       Objective:  Vitals:    03/28/19 1131   BP: 137/76   Pulse: 80   Resp: 16   Temp: 97.1 °F (36.2 °C)   TempSrc: Oral   SpO2: 97%   Weight: 144 lb (65.3 kg)   Height: 5' 5\" (1.651 m)   PainSc:   0 - No pain       PE  General appearance - alert, well appearing, and in no distress  Mental status - normal mood, behavior, speech, dress, motor activity, and thought processes  Ears - bilateral TM's and external ear canals normal  Nose - normal and patent, no erythema, discharge or polyps  Mouth - mucous membranes moist, pharynx normal without lesions  Neck - supple, no significant adenopathy  Chest - clear to auscultation, no wheezes, rales or rhonchi, symmetric air entry  Heart - normal rate and regular rhythm    LABS   AMB POC RAPID Geovanny Boss [[id number]] (Order [id number])   Point of Care Testing   Date: 3/28/2019 Department: [facility] Ordering/Authorizing: Manish Garcia NP   Component Value Flag Ref Range Units Status   VALID INTERNAL CONTROL POC Yes     Final   Group A Strep Ag Negative   Negative  Final           Assessment/Plan:    1. Acute Sinusitis- will treat with Amoxicillin based on duration of symptoms; fluconazole if needed for antibiotic induced yeast infection; continue with OTC allergy medication    2. Sore Throat- Rapid Strep negative    Lab review: no lab studies available for review at time of visit          Today's Visit:   Orders Placed This Encounter    AMB POC RAPID STREP A    amoxicillin (AMOXIL) 500 mg capsule     Sig: Take 1 Cap by mouth three (3) times daily for 7 days. Dispense:  21 Cap     Refill:  0    fluconazole (DIFLUCAN) 150 mg tablet     Sig: Take 1 Tab by mouth daily for 1 day. FDA advises cautious prescribing of oral fluconazole in pregnancy. Dispense:  1 Tab     Refill:  0         Health Maintenance:     I have discussed the diagnosis with the patient and the intended plan as seen in the above orders. The patient has received an after-visit summary and questions were answered concerning future plans. I have discussed medication side effects and warnings with the patient as well. I have reviewed the plan of care with the patient, accepted their input and they are in agreement with the treatment goals. Follow-up and Dispositions    · Return if symptoms worsen or fail to improve. More than 1/2 of this 15 minute visit was spent in counseling and coordination of care, as described above.     ANDRES Nair

## 2019-03-28 NOTE — PATIENT INSTRUCTIONS
Sinusitis: Care Instructions  Your Care Instructions    Sinusitis is an infection of the lining of the sinus cavities in your head. Sinusitis often follows a cold. It causes pain and pressure in your head and face. In most cases, sinusitis gets better on its own in 1 to 2 weeks. But some mild symptoms may last for several weeks. Sometimes antibiotics are needed. Follow-up care is a key part of your treatment and safety. Be sure to make and go to all appointments, and call your doctor if you are having problems. It's also a good idea to know your test results and keep a list of the medicines you take. How can you care for yourself at home? · Take an over-the-counter pain medicine, such as acetaminophen (Tylenol), ibuprofen (Advil, Motrin), or naproxen (Aleve). Read and follow all instructions on the label. · If the doctor prescribed antibiotics, take them as directed. Do not stop taking them just because you feel better. You need to take the full course of antibiotics. · Be careful when taking over-the-counter cold or flu medicines and Tylenol at the same time. Many of these medicines have acetaminophen, which is Tylenol. Read the labels to make sure that you are not taking more than the recommended dose. Too much acetaminophen (Tylenol) can be harmful. · Breathe warm, moist air from a steamy shower, a hot bath, or a sink filled with hot water. Avoid cold, dry air. Using a humidifier in your home may help. Follow the directions for cleaning the machine. · Use saline (saltwater) nasal washes to help keep your nasal passages open and wash out mucus and bacteria. You can buy saline nose drops at a grocery store or drugstore. Or you can make your own at home by adding 1 teaspoon of salt and 1 teaspoon of baking soda to 2 cups of distilled water. If you make your own, fill a bulb syringe with the solution, insert the tip into your nostril, and squeeze gently. [place] Irons your nose.   · Put a hot, wet towel or a warm gel pack on your face 3 or 4 times a day for 5 to 10 minutes each time. · Try a decongestant nasal spray like oxymetazoline (Afrin). Do not use it for more than 3 days in a row. Using it for more than 3 days can make your congestion worse. When should you call for help? Call your doctor now or seek immediate medical care if:    · You have new or worse swelling or redness in your face or around your eyes.     · You have a new or higher fever.    Watch closely for changes in your health, and be sure to contact your doctor if:    · You have new or worse facial pain.     · The mucus from your nose becomes thicker (like pus) or has new blood in it.     · You are not getting better as expected. Where can you learn more? Go to http://alba-mariaelena.info/. Enter N625 in the search box to learn more about \"Sinusitis: Care Instructions. \"  Current as of: March 27, 2018  Content Version: 11.9  © 9337-2046 [employer]. Care instructions adapted under license by [employer] (which disclaims liability or warranty for this information). If you have questions about a medical condition or this instruction, always ask your healthcare professional. [place] [address] any warranty or liability for your use of this information.

## 2019-04-25 ENCOUNTER — OFFICE VISIT (OUTPATIENT)
Dept: FAMILY MEDICINE CLINIC | Age: 74
End: 2019-04-25

## 2019-04-25 ENCOUNTER — HOSPITAL ENCOUNTER (OUTPATIENT)
Dept: LAB | Age: 74
Discharge: HOME | End: 2019-04-25
Payer: MEDICARE

## 2019-04-25 VITALS
DIASTOLIC BLOOD PRESSURE: 68 MMHG | HEART RATE: 70 BPM | WEIGHT: 146 LBS | TEMPERATURE: 96.7 F | RESPIRATION RATE: 15 BRPM | SYSTOLIC BLOOD PRESSURE: 136 MMHG | BODY MASS INDEX: 24.32 KG/M2 | HEIGHT: 65 IN | OXYGEN SATURATION: 95 %

## 2019-04-25 DIAGNOSIS — R53.82 CHRONIC FATIGUE: ICD-10-CM

## 2019-04-25 DIAGNOSIS — L30.9 DERMATITIS: Primary | ICD-10-CM

## 2019-04-25 LAB
25(OH)D3 SERPL-MCNC: 30.4 NG/ML (ref 30–100)
ALBUMIN SERPL-MCNC: 3.7 G/DL (ref 3.4–5)
ALBUMIN/GLOB SERPL: 1 {RATIO} (ref 0.8–1.7)
ALP SERPL-CCNC: 81 U/L (ref 45–117)
ALT SERPL-CCNC: 26 U/L (ref 13–56)
ANION GAP SERPL CALC-SCNC: 6 MMOL/L (ref 3–18)
AST SERPL-CCNC: 19 U/L (ref 15–37)
BASOPHILS # BLD: 0 K/UL (ref 0–0.1)
BASOPHILS NFR BLD: 1 % (ref 0–2)
BILIRUB SERPL-MCNC: 1.2 MG/DL (ref 0.2–1)
BUN SERPL-MCNC: 17 MG/DL (ref 7–18)
BUN/CREAT SERPL: 24 (ref 12–20)
CALCIUM SERPL-MCNC: 8.7 MG/DL (ref 8.5–10.1)
CHLORIDE SERPL-SCNC: 106 MMOL/L (ref 100–108)
CO2 SERPL-SCNC: 27 MMOL/L (ref 21–32)
CREAT SERPL-MCNC: 0.7 MG/DL (ref 0.6–1.3)
DIFFERENTIAL METHOD BLD: ABNORMAL
EOSINOPHIL # BLD: 0.4 K/UL (ref 0–0.4)
EOSINOPHIL NFR BLD: 6 % (ref 0–5)
ERYTHROCYTE [DISTWIDTH] IN BLOOD BY AUTOMATED COUNT: 13.8 % (ref 11.6–14.5)
GLOBULIN SER CALC-MCNC: 3.7 G/DL (ref 2–4)
GLUCOSE SERPL-MCNC: 74 MG/DL (ref 74–99)
HCT VFR BLD AUTO: 42.2 % (ref 35–45)
HGB BLD-MCNC: 13.2 G/DL (ref 12–16)
LYMPHOCYTES # BLD: 1.5 K/UL (ref 0.9–3.6)
LYMPHOCYTES NFR BLD: 25 % (ref 21–52)
MCH RBC QN AUTO: 29.1 PG (ref 24–34)
MCHC RBC AUTO-ENTMCNC: 31.3 G/DL (ref 31–37)
MCV RBC AUTO: 93.2 FL (ref 74–97)
MONOCYTES # BLD: 0.7 K/UL (ref 0.05–1.2)
MONOCYTES NFR BLD: 12 % (ref 3–10)
NEUTS SEG # BLD: 3.4 K/UL (ref 1.8–8)
NEUTS SEG NFR BLD: 56 % (ref 40–73)
PLATELET # BLD AUTO: 186 K/UL (ref 135–420)
PMV BLD AUTO: 10.7 FL (ref 9.2–11.8)
POTASSIUM SERPL-SCNC: 4.1 MMOL/L (ref 3.5–5.5)
PROT SERPL-MCNC: 7.4 G/DL (ref 6.4–8.2)
RBC # BLD AUTO: 4.53 M/UL (ref 4.2–5.3)
SODIUM SERPL-SCNC: 139 MMOL/L (ref 136–145)
T3FREE SERPL-MCNC: 2.5 PG/ML (ref 2.18–3.98)
T4 FREE SERPL-MCNC: 0.8 NG/DL (ref 0.7–1.5)
TSH SERPL DL<=0.05 MIU/L-ACNC: 1.04 UIU/ML (ref 0.36–3.74)
VIT B12 SERPL-MCNC: 1516 PG/ML (ref 211–911)
WBC # BLD AUTO: 6.1 K/UL (ref 4.6–13.2)

## 2019-04-25 PROCEDURE — 84481 FREE ASSAY (FT-3): CPT

## 2019-04-25 PROCEDURE — 85025 COMPLETE CBC W/AUTO DIFF WBC: CPT

## 2019-04-25 PROCEDURE — 82607 VITAMIN B-12: CPT

## 2019-04-25 PROCEDURE — 82306 VITAMIN D 25 HYDROXY: CPT

## 2019-04-25 PROCEDURE — 84439 ASSAY OF FREE THYROXINE: CPT

## 2019-04-25 PROCEDURE — 80053 COMPREHEN METABOLIC PANEL: CPT

## 2019-04-25 PROCEDURE — 84443 ASSAY THYROID STIM HORMONE: CPT

## 2019-04-25 PROCEDURE — 36415 COLL VENOUS BLD VENIPUNCTURE: CPT

## 2019-04-25 RX ORDER — TRIAMCINOLONE ACETONIDE 1 MG/G
OINTMENT TOPICAL 2 TIMES DAILY
Qty: 30 G | Refills: 0 | Status: SHIPPED | OUTPATIENT
Start: 2019-04-25 | End: 2021-08-17

## 2019-04-25 NOTE — PROGRESS NOTES
1. Have you been to the ER, urgent care clinic since your last visit? Hospitalized since your last visit? No 
 
2. Have you seen or consulted any other health care providers outside of the [address] [place] [address] since your last visit? Include any pap smears or colon screening.  No

## 2019-04-25 NOTE — PROGRESS NOTES
History of Present Illness Angelica Granger is a 74 y.o. female who presents today for management of 
 
Chief Complaint Patient presents with  
 Skin Problem  
  a few weeks, itching/stinging right arm  Fatigue Patient complains of a single papule on the right arm. Onset was one month. It is associated with intermittent pain and pruritus. Fatigue related history: 
Patient complains of fatigue. Symptoms onset: several weeks ago. Symptoms accompanying the fatigue have been general malaise, fatigue with paradoxical insomnia, hypersomnolence. Possible contributing events recalled by Cristofer Huertas[de-identified] depression: mild. Patient denies fever, significant change in weight, symptoms of true arthritis, cold intolerance, constipation and change in hair texture., GI blood loss, witnessed or suspected sleep apnea. The course has been waxing and waning. Severity has been symptoms bothersome, but easily able to carry out all usual work/school/family. Problem List 
Patient Active Problem List  
 Diagnosis Date Noted  Hypercholesteremia 09/11/2018  Cystocele, midline 09/10/2018  Recurrent depression ([facility] [address] 09/10/2018  Uterine prolapse  Recurrent UTI  Anxiety Past Medical History Past Medical History:  
Diagnosis Date  Allergic rhinitis  Anxiety  Arthropathy  Atrophic vaginitis  Back pain  Chest pain, atypical   
 Cystocele  Depression  Flank pain  HTN (hypertension)  Hypercholesteremia  Hyperlipidemia  Leg pain  Menopause  Metabolic disease  Mitral valve prolapse   
 with mitral regurgitation  Osteopenia  Recurrent UTI  right heel pain  Uterine prolapse  UTI (urinary tract infection) Surgical History Past Surgical History:  
Procedure Laterality Date  HX BREAST BIOPSY Left ? 2010 Benign  HX CATARACT REMOVAL Right 2014 Current Medications Current Outpatient Medications Medication Sig  
  triamcinolone acetonide (KENALOG) 0.1 % ointment Apply  to affected area two (2) times a day. use thin layer  ALPRAZolam (XANAX) 0.25 mg tablet TAKE 1 TABLET BY MOUTH EVERY DAY AS NEEDED FOR ANXIETY  ezetimibe (ZETIA) 10 mg tablet Take 1 Tab by mouth daily.  cyanocobalamin 1,000 mcg tablet Take  by mouth.  fluticasone (FLONASE) 50 mcg/actuation nasal spray by Nasal route.  loratadine (CLARITIN) 10 mg tablet Take  by mouth.  Cholecalciferol, Vitamin D3, (VITAMIN D3) 2,000 unit cap capsule Take  by mouth.  cranberry fruit extract (CRANBERRY PO) Take  by Mouth.  sertraline (ZOLOFT) 100 mg tablet Take 50 mg by mouth daily.  lidocaine (LIDODERM) 5 % Apply patch to the affected area for 12 hours a day and remove for 12 hours a day.  azelastine (ASTELIN) 137 mcg (0.1 %) nasal spray 1 [place] by Both Nostrils route two (2) times a day. Use in each nostril as directed No current facility-administered medications for this visit. Allergies/Drug Reactions Allergies Allergen Reactions  Nitrofurantoin Hives and Rash  Atorvastatin Myalgia Family History Family History Problem Relation Age of Onset  Heart Disease Mother  Cancer Father  Heart Disease Brother  Heart Disease Sister Social History Social History Socioeconomic History  Marital status:  Spouse name: Not on file  Number of children: Not on file  Years of education: Not on file  Highest education level: Not on file Occupational History  Not on file Social Needs  Financial resource strain: Not on file  Food insecurity:  
  Worry: Not on file Inability: Not on file  Transportation needs:  
  Medical: Not on file Non-medical: Not on file Tobacco Use  Smoking status: Never Smoker  Smokeless tobacco: Never Used Substance and Sexual Activity  Alcohol use: No  
  Alcohol/week: 0.0 oz  Drug use: No  
 Sexual activity: Never Lifestyle  Physical activity:  
  Days per week: Not on file Minutes per session: Not on file  Stress: Not on file Relationships  Social connections:  
  Talks on phone: Not on file Gets together: Not on file Attends Yazidism service: Not on file Active member of club or organization: Not on file Attends meetings of clubs or organizations: Not on file Relationship status: Not on file  Intimate partner violence:  
  Fear of current or ex partner: Not on file Emotionally abused: Not on file Physically abused: Not on file Forced sexual activity: Not on file Other Topics Concern  Not on file Social History Narrative  Not on file Review of Systems Negative except as mentioned in HPI Physical Exam 
Vital signs:  
Vitals:  
 04/25/19 9400 BP: 136/68 Pulse: 70 Resp: 15 Temp: 96.7 °F (35.9 °C) TempSrc: Oral  
SpO2: 95% Weight: 146 lb (66.2 kg) Height: 5' 5\" (1.651 m) General: alert, oriented, not in distress Eyes: clear conjunctivae, anicteric sclerae, full and equal ROMs Chest/Lungs: clear breath sounds, no wheezing or crackles Heart: normal rate, regular rhythm, no murmur Extremities: no focal deformities, no edema Neuro: AAOx3, CN's grossly intact Skin: no visible abnormalities Laboratory/Tests: 
 
Component Latest Ref Rng & Units 9/10/2018 9/10/2018 9/10/2018 9/10/2018  
 
      1:12 PM  1:12 PM  1:12 PM  1:12 PM  
WBC 
    4.6 - 13.2 K/uL    6.7  
RBC 
    4.20 - 5.30 M/uL    4.91  
HGB 12.0 - 16.0 g/dL    14.8 HCT 
    35.0 - 45.0 %    45.9 (H) MCV 
    74.0 - 97.0 FL    93.5 MCH 
    24.0 - 34.0 PG    30.1 MCHC 31.0 - 37.0 g/dL    32.2 RDW 
    11.6 - 14.5 %    13.9 PLATELET 
    508 - 602 K/uL    234 MPV 
    9.2 - 11.8 FL    11.4 NEUTROPHILS 
    40 - 73 %    67 LYMPHOCYTES 
    21 - 52 %    20 (L) MONOCYTES 
    3 - 10 %    8 EOSINOPHILS 
    0 - 5 %    4 BASOPHILS 0 - 2 %    1  
ABS. NEUTROPHILS 
    1.8 - 8.0 K/UL    4.5  
ABS. LYMPHOCYTES 
    0.9 - 3.6 K/UL    1.3  
ABS. MONOCYTES 
    0.05 - 1.2 K/UL    0.6  
ABS. EOSINOPHILS 
    0.0 - 0.4 K/UL    0.3  
ABS. BASOPHILS 
    0.0 - 0.1 K/UL    0.0  
DF AUTOMATED Sodium 136 - 145 mmol/L  142 Potassium 3.5 - 5.5 mmol/L  4.7 Chloride 100 - 108 mmol/L  105 CO2 
    21 - 32 mmol/L  29 Anion gap 3.0 - 18 mmol/L  8 Glucose 74 - 99 mg/dL  86 BUN 
    7.0 - 18 MG/DL  18 Creatinine 
    0.6 - 1.3 MG/DL  0.82    
BUN/Creatinine ratio 12 - 20    22 (H) GFR est AA 
    >60 ml/min/1.73m2  >60    
GFR est non-AA 
    >60 ml/min/1.73m2  >60 Calcium 8.5 - 10.1 MG/DL  9.2 Bilirubin, total 
    0.2 - 1.0 MG/DL  1.6 (H) ALT (SGPT) 13 - 56 U/L  27 AST 
    15 - 37 U/L  18 Alk. phosphatase 45 - 117 U/L  92 Protein, total 
    6.4 - 8.2 g/dL  8.1 Albumin 3.4 - 5.0 g/dL  4.1 Globulin 2.0 - 4.0 g/dL  4.0 A-G Ratio 
    0.8 - 1.7    1.0 Cholesterol, total 
    <200 MG/DL   259 (H) Triglyceride 
    <150 MG/DL   131 HDL Cholesterol 40 - 60 MG/DL   79 (H) LDL, calculated 0 - 100 MG/DL   153.8 (H) VLDL, calculated MG/DL   26.2 CHOL/HDL Ratio 0 - 5.0     3.3 TSH 
    0.36 - 3.74 uIU/mL 0.85 Assessment/Plan: ICD-10-CM ICD-9-CM 1. Dermatitis L30.9 692.9 triamcinolone acetonide (KENALOG) 0.1 % ointment 2. Chronic fatigue R53.82 780.79 TSH 3RD GENERATION  
   T4, FREE  
   T3, FREE  
   VITAMIN B12  
   VITAMIN D, 25 HYDROXY  
   METABOLIC PANEL, COMPREHENSIVE  
   CBC WITH AUTOMATED DIFF Advised to follow-up with her dermatologist for annual skin cancer screening. Advised to start regular exercise, at least 30 minutes of walking daily. Follow-up and Dispositions · Return in about 7 weeks (around 6/11/2019) for ROV. I have discussed the diagnosis with the patient and the intended plan as seen in the above orders. The patient has received an after-visit summary and questions were answered concerning future plans. I have discussed medication side effects and warnings with the patient as well. I have reviewed the plan of care with the patient, accepted their input and they are in agreement with the treatment goals. Diaz Ramsey MD 
April 25, 2019

## 2019-05-02 DIAGNOSIS — F33.9 RECURRENT DEPRESSION (HCC): Primary | ICD-10-CM

## 2019-05-02 RX ORDER — SERTRALINE HYDROCHLORIDE 50 MG/1
50 TABLET, FILM COATED ORAL DAILY
Qty: 90 TAB | Refills: 2 | Status: SHIPPED | OUTPATIENT
Start: 2019-05-02 | End: 2019-05-03 | Stop reason: SDUPTHER

## 2019-05-02 NOTE — TELEPHONE ENCOUNTER
Requested Prescriptions     Pending Prescriptions Disp Refills    sertraline (ZOLOFT) 100 mg tablet       Sig: Take 0.5 Tabs by mouth daily.

## 2019-05-03 ENCOUNTER — TELEPHONE (OUTPATIENT)
Dept: FAMILY MEDICINE CLINIC | Age: 74
End: 2019-05-03

## 2019-05-03 RX ORDER — SERTRALINE HYDROCHLORIDE 100 MG/1
100 TABLET, FILM COATED ORAL DAILY
Qty: 90 TAB | Refills: 3 | Status: SHIPPED | OUTPATIENT
Start: 2019-05-03 | End: 2020-06-22

## 2019-05-03 NOTE — TELEPHONE ENCOUNTER
Patient states she typically takes sertraline 100mg daily. Wanted to know if you wanted her to decrease it to 50mg daily or if it was mistake. Pt hasn't picked up medication from pharmacy as of yet.

## 2019-05-03 NOTE — TELEPHONE ENCOUNTER
Patient called stating she has not picked up the prescription for Zoloft 50 mg. She has been taking 100m g daily. Was this a mistake? Please call letting her know today. She only has one tablet left.

## 2019-05-03 NOTE — TELEPHONE ENCOUNTER
Called and spoke with patient and informed her Dr. GLASGOW sent sertraline 100mg daily to pharmacy. She verbalized understanding.

## 2019-06-11 ENCOUNTER — OFFICE VISIT (OUTPATIENT)
Dept: FAMILY MEDICINE CLINIC | Age: 74
End: 2019-06-11

## 2019-06-11 VITALS
BODY MASS INDEX: 23.71 KG/M2 | HEART RATE: 89 BPM | DIASTOLIC BLOOD PRESSURE: 83 MMHG | SYSTOLIC BLOOD PRESSURE: 125 MMHG | RESPIRATION RATE: 16 BRPM | WEIGHT: 142.3 LBS | TEMPERATURE: 97.8 F | OXYGEN SATURATION: 98 % | HEIGHT: 65 IN

## 2019-06-11 DIAGNOSIS — E78.00 HYPERCHOLESTEREMIA: ICD-10-CM

## 2019-06-11 DIAGNOSIS — F41.9 ANXIETY: ICD-10-CM

## 2019-06-11 DIAGNOSIS — Z13.39 SCREENING FOR ALCOHOLISM: ICD-10-CM

## 2019-06-11 DIAGNOSIS — S93.402S SPRAIN OF LEFT ANKLE, UNSPECIFIED LIGAMENT, SEQUELA: ICD-10-CM

## 2019-06-11 DIAGNOSIS — M85.80 SENILE OSTEOPENIA: ICD-10-CM

## 2019-06-11 DIAGNOSIS — Z12.39 SCREENING FOR MALIGNANT NEOPLASM OF BREAST: ICD-10-CM

## 2019-06-11 DIAGNOSIS — Z13.31 SCREENING FOR DEPRESSION: ICD-10-CM

## 2019-06-11 DIAGNOSIS — F33.9 RECURRENT DEPRESSION (HCC): Primary | ICD-10-CM

## 2019-06-11 DIAGNOSIS — Z78.0 POSTMENOPAUSAL: ICD-10-CM

## 2019-06-11 DIAGNOSIS — Z00.00 MEDICARE ANNUAL WELLNESS VISIT, SUBSEQUENT: ICD-10-CM

## 2019-06-11 RX ORDER — EZETIMIBE 10 MG/1
10 TABLET ORAL DAILY
Qty: 90 TAB | Refills: 2 | Status: SHIPPED | OUTPATIENT
Start: 2019-06-11 | End: 2019-12-11

## 2019-06-11 NOTE — PATIENT INSTRUCTIONS
Medicare Wellness Visit, Female The best way to live healthy is to have a lifestyle where you eat a well-balanced diet, exercise regularly, limit alcohol use, and quit all forms of tobacco/nicotine, if applicable. Regular preventive services are another way to keep healthy. Preventive services (vaccines, screening tests, monitoring & exams) can help personalize your care plan, which helps you manage your own care. Screening tests can find health problems at the earliest stages, when they are easiest to treat. Anatoliy Larsen follows the current, evidence-based guidelines published by the [facility] Esvin ([facility]) when recommending preventive services for our patients. Because we follow these guidelines, sometimes recommendations change over time as research supports it. (For example, mammograms used to be recommended annually. Even though Medicare will still pay for an annual mammogram, the newer guidelines recommend a mammogram every two years for women of average risk.) Of course, you and your doctor may decide to screen more often for some diseases, based on your risk and your health status. Preventive services for you include: - Medicare offers their members a free annual wellness visit, which is time for you and your primary care provider to discuss and plan for your preventive service needs. Take advantage of this benefit every year! 
-All adults over the age of 72 should receive the recommended pneumonia vaccines. Current USPSTF guidelines recommend a series of two vaccines for the best pneumonia protection.  
-All adults should have a flu vaccine yearly and a tetanus vaccine every 10 years. All adults age 61 and older should receive a shingles vaccine once in their lifetime.   
-A bone mass density test is recommended when a woman turns 65 to screen for osteoporosis. This test is only recommended one time, as a screening. Some providers will use this same test as a disease monitoring tool if you already have osteoporosis. -All adults age 38-68 who are overweight should have a diabetes screening test once every three years.  
-Other screening tests and preventive services for persons with diabetes include: an eye exam to screen for diabetic retinopathy, a kidney function test, a foot exam, and stricter control over your cholesterol.  
-Cardiovascular screening for adults with routine risk involves an electrocardiogram (ECG) at intervals determined by your doctor.  
-Colorectal cancer screenings should be done for adults age 54-65 with no increased risk factors for colorectal cancer. There are a number of acceptable methods of screening for this type of cancer. Each test has its own benefits and drawbacks. Discuss with your doctor what is most appropriate for you during your annual wellness visit. The different tests include: colonoscopy (considered the best screening method), a fecal occult blood test, a fecal DNA test, and sigmoidoscopy. -Breast cancer screenings are recommended every other year for women of normal risk, age 54-69. 
-Cervical cancer screenings for women over age 72 are only recommended with certain risk factors.  
-All adults born between [facility] should be screened once for Hepatitis C. Here is a list of your current Health Maintenance items (your personalized list of preventive services) with a due date: 
Health Maintenance Due Topic Date Due  Glaucoma Screening   08/05/2008  Pneumococcal Vaccine (1 of 2 - PCV13) 08/05/2008 [address] [place] [address] Annual Well Visit  03/14/2018

## 2019-06-11 NOTE — PROGRESS NOTES
History of Present Illness  Cristin Aguirre is a 74 y.o. female who presents today for management of    Chief Complaint   Patient presents with   [address] Annual Wellness Visit    Anxiety       Patient fell 4 weeks while coming out of the [place] steps. She was seen by an orthopedic surgeon and was told that she has a bad ankle sprain. Pain intensity is 6/10, mild swelling. She is wearing an aircast boot. Problem List  Patient Active Problem List    Diagnosis Date Noted    Hypercholesteremia 09/11/2018    Cystocele, midline 09/10/2018    Recurrent depression ([address] 09/10/2018    Uterine prolapse     Recurrent UTI     Anxiety        Past Medical History  Past Medical History:   Diagnosis Date    Allergic rhinitis     Anxiety     Arthropathy     Atrophic vaginitis     Back pain     Chest pain, atypical     Cystocele     Depression     Flank pain     HTN (hypertension)     Hypercholesteremia     Hyperlipidemia     Leg pain     Menopause     Metabolic disease     Mitral valve prolapse     with mitral regurgitation    Osteopenia     Recurrent UTI     right heel pain     Uterine prolapse     UTI (urinary tract infection)         Surgical History  Past Surgical History:   Procedure Laterality Date    HX BREAST BIOPSY Left ? 2010    Benign    HX CATARACT REMOVAL Right 2014        Current Medications  Current Outpatient Medications   Medication Sig    ezetimibe (ZETIA) 10 mg tablet Take 1 Tab by mouth daily.  sertraline (ZOLOFT) 100 mg tablet Take 1 Tab by mouth daily.  triamcinolone acetonide (KENALOG) 0.1 % ointment Apply  to affected area two (2) times a day. use thin layer    ALPRAZolam (XANAX) 0.25 mg tablet TAKE 1 TABLET BY MOUTH EVERY DAY AS NEEDED FOR ANXIETY    lidocaine (LIDODERM) 5 % Apply patch to the affected area for 12 hours a day and remove for 12 hours a day.     azelastine (ASTELIN) 137 mcg (0.1 %) nasal spray 1 [place] by Both Nostrils route two (2) times a day. Use in each nostril as directed    cyanocobalamin 1,000 mcg tablet Take  by mouth.  fluticasone (FLONASE) 50 mcg/actuation nasal spray by Nasal route.  loratadine (CLARITIN) 10 mg tablet Take  by mouth.  Cholecalciferol, Vitamin D3, (VITAMIN D3) 2,000 unit cap capsule Take  by mouth.  cranberry fruit extract (CRANBERRY PO) Take  by Mouth. No current facility-administered medications for this visit.         Allergies/Drug Reactions  Allergies   Allergen Reactions    Nitrofurantoin Hives and Rash    Atorvastatin Myalgia        Family History  Family History   Problem Relation Age of Onset    Heart Disease Mother     Cancer Father     Heart Disease Brother     Heart Disease Sister         Social History  Social History     Socioeconomic History    Marital status:      Spouse name: Not on file    Number of children: Not on file    Years of education: Not on file    Highest education level: Not on file   Occupational History    Not on file   Social Needs    Financial resource strain: Not on file    Food insecurity:     Worry: Not on file     Inability: Not on file    Transportation needs:     Medical: Not on file     Non-medical: Not on file   Tobacco Use    Smoking status: Never Smoker    Smokeless tobacco: Never Used   Substance and Sexual Activity    Alcohol use: No     Alcohol/week: 0.0 oz    Drug use: No    Sexual activity: Never   Lifestyle    Physical activity:     Days per week: Not on file     Minutes per session: Not on file    Stress: Not on file   Relationships    Social connections:     Talks on phone: Not on file     Gets together: Not on file     Attends Druze service: Not on file     Active member of club or organization: Not on file     Attends meetings of clubs or organizations: Not on file     Relationship status: Not on file    Intimate partner violence:     Fear of current or ex partner: Not on file     Emotionally abused: Not on file Physically abused: Not on file     Forced sexual activity: Not on file   Other Topics Concern    Not on file   Social History Narrative    Not on file       Review of Systems  Negative except as mentioned in HPI      Physical Exam  Vital signs:   Vitals:    06/11/19 1136   BP: 125/83   Pulse: 89   Resp: 16   Temp: 97.8 °F (36.6 °C)   TempSrc: Oral   SpO2: 98%   Weight: 142 lb 4.8 oz (64.5 kg)   Height: 5' 5\" (1.651 m)       General: alert, oriented, not in distress, ambulatory  Eyes: clear conjunctivae, anicteric sclerae, full and equal ROMs  Chest/Lungs: clear breath sounds, no wheezing or crackles  Heart: normal rate, regular rhythm, no murmur  Extremities: no focal deformities, no ankle edema  Neuro: AAOx3, CN's grossly intact  Skin: no visible abnormalities      Laboratory/Tests:  Component      Latest Ref Rng & Units 4/25/2019 4/25/2019 4/25/2019 4/25/2019           8:26 AM  8:26 AM  8:26 AM  8:26 AM   WBC      4.6 - 13.2 K/uL    6.1   RBC      4.20 - 5.30 M/uL    4.53   HGB      12.0 - 16.0 g/dL    13.2   HCT      35.0 - 45.0 %    42.2   MCV      74.0 - 97.0 FL    93.2   MCH      24.0 - 34.0 PG    29.1   MCHC      31.0 - 37.0 g/dL    31.3   RDW      11.6 - 14.5 %    13.8   PLATELET      897 - 050 K/uL    186   MPV      9.2 - 11.8 FL    10.7   NEUTROPHILS      40 - 73 %    56   LYMPHOCYTES      21 - 52 %    25   MONOCYTES      3 - 10 %    12 (H)   EOSINOPHILS      0 - 5 %    6 (H)   BASOPHILS      0 - 2 %    1   ABS. NEUTROPHILS      1.8 - 8.0 K/UL    3.4   ABS. LYMPHOCYTES      0.9 - 3.6 K/UL    1.5   ABS. MONOCYTES      0.05 - 1.2 K/UL    0.7   ABS. EOSINOPHILS      0.0 - 0.4 K/UL    0.4   ABS.  BASOPHILS      0.0 - 0.1 K/UL    0.0   DF          AUTOMATED   Sodium      136 - 145 mmol/L   139    Potassium      3.5 - 5.5 mmol/L   4.1    Chloride      100 - 108 mmol/L   106    CO2      21 - 32 mmol/L   27    Anion gap      3.0 - 18 mmol/L   6    Glucose      74 - 99 mg/dL   74    BUN      7.0 - 18 MG/DL   17 Creatinine      0.6 - 1.3 MG/DL   0.70    BUN/Creatinine ratio      12 - 20     24 (H)    GFR est AA      >60 ml/min/1.73m2   >60    GFR est non-AA      >60 ml/min/1.73m2   >60    Calcium      8.5 - 10.1 MG/DL   8.7    Bilirubin, total      0.2 - 1.0 MG/DL   1.2 (H)    ALT (SGPT)      13 - 56 U/L   26    AST      15 - 37 U/L   19    Alk. phosphatase      45 - 117 U/L   81    Protein, total      6.4 - 8.2 g/dL   7.4    Albumin      3.4 - 5.0 g/dL   3.7    Globulin      2.0 - 4.0 g/dL   3.7    A-G Ratio      0.8 - 1.7     1.0    Vitamin B12      211 - 911 pg/mL  1,516 (H)     Vitamin D 25-Hydroxy      30 - 100 ng/mL 30.4        Component      Latest Ref Rng & Units 4/25/2019 4/25/2019 4/25/2019           8:26 AM  8:26 AM  8:26 AM   TSH      0.36 - 3.74 uIU/mL   1.04   T4, Free      0.7 - 1.5 NG/DL  0.8    Triiodothyronine (T3), free      2.18 - 3.98 PG/ML 2.5         Assessment/Plan:      1. Hypercholesteremia  - low fat diet  - continue ezetimibe (ZETIA) 10 mg tablet; Take 1 Tab by mouth daily. Dispense: 90 Tab; Refill: 2    2. Recurrent depression ([address]  - stable  - continue sertraline    3. Anxiety  - stable, rarely uses lorazepam    4. Left ankle sprain  - Orthopedics follow-up  - Tylenol PRN    5. Senile osteopenia  - start calcium 1200mg per day  - continue vitamin D      Follow-up and Dispositions    · Return in about 6 months (around 12/11/2019) for ROV. I have discussed the diagnosis with the patient and the intended plan as seen in the above orders. The patient has received an after-visit summary and questions were answered concerning future plans. I have discussed medication side effects and warnings with the patient as well. I have reviewed the plan of care with the patient, accepted their input and they are in agreement with the treatment goals.        Kurt Velasco MD  June 11, 2019      This is the Subsequent Medicare Annual Wellness Exam, performed 12 months or more after the Initial AWV or the last Subsequent AWV    I have reviewed the patient's medical history in detail and updated the computerized patient record. History     Past Medical History:   Diagnosis Date    Allergic rhinitis     Anxiety     Arthropathy     Atrophic vaginitis     Back pain     Chest pain, atypical     Cystocele     Depression     Flank pain     HTN (hypertension)     Hypercholesteremia     Hyperlipidemia     Leg pain     Menopause     Metabolic disease     Mitral valve prolapse     with mitral regurgitation    Osteopenia     Recurrent UTI     right heel pain     Uterine prolapse     UTI (urinary tract infection)       Past Surgical History:   Procedure Laterality Date    HX BREAST BIOPSY Left ? 2010    Benign    HX CATARACT REMOVAL Right 2014     Current Outpatient Medications   Medication Sig Dispense Refill    ezetimibe (ZETIA) 10 mg tablet Take 1 Tab by mouth daily. 90 Tab 2    sertraline (ZOLOFT) 100 mg tablet Take 1 Tab by mouth daily. 90 Tab 3    triamcinolone acetonide (KENALOG) 0.1 % ointment Apply  to affected area two (2) times a day. use thin layer 30 g 0    ALPRAZolam (XANAX) 0.25 mg tablet TAKE 1 TABLET BY MOUTH EVERY DAY AS NEEDED FOR ANXIETY 60 Tab 0    lidocaine (LIDODERM) 5 % Apply patch to the affected area for 12 hours a day and remove for 12 hours a day. 20 Each 0    azelastine (ASTELIN) 137 mcg (0.1 %) nasal spray 1 [place] by Both Nostrils route two (2) times a day. Use in each nostril as directed 1 Bottle 0    cyanocobalamin 1,000 mcg tablet Take  by mouth.  fluticasone (FLONASE) 50 mcg/actuation nasal spray by Nasal route.  loratadine (CLARITIN) 10 mg tablet Take  by mouth.  Cholecalciferol, Vitamin D3, (VITAMIN D3) 2,000 unit cap capsule Take  by mouth.  cranberry fruit extract (CRANBERRY PO) Take  by Mouth.        Allergies   Allergen Reactions    Nitrofurantoin Hives and Rash    Atorvastatin Myalgia     Family History   Problem Relation Age of Onset    Heart Disease Mother     Cancer Father     Heart Disease Brother     Heart Disease Sister      Social History     Tobacco Use    Smoking status: Never Smoker    Smokeless tobacco: Never Used   Substance Use Topics    Alcohol use: No     Alcohol/week: 0.0 oz     Patient Active Problem List   Diagnosis Code    Anxiety F41.9    Recurrent UTI N39.0    Uterine prolapse N81.4    Cystocele, midline N81.11    Recurrent depression ([facility] [address] F33.9    Hypercholesteremia E78.00       Depression Risk Factor Screening:     3 most recent PHQ Screens 6/11/2019   Little interest or pleasure in doing things Not at all   Feeling down, depressed, irritable, or hopeless Not at all   Total Score PHQ 2 0     Alcohol Risk Factor Screening: You do not drink alcohol or very rarely. Functional Ability and Level of Safety:   Hearing Loss  Hearing is good. Activities of Daily Living  The home contains: no safety equipment. Patient does total self care    Fall Risk  Fall Risk Assessment, last 12 mths 6/11/2019   Able to walk? Yes   Fall in past 12 months? Yes   Fall with injury? Yes   Number of falls in past 12 months 1   Fall Risk Score 2       Abuse Screen  Patient is not abused    Cognitive Screening   Evaluation of Cognitive Function:  Has your family/caregiver stated any concerns about your memory: no  Normal    Patient Care Team   Patient Care Team:  Demetris Fitch MD as PCP - General (Internal Medicine)  [place], NP as Nurse Practitioner (Urology)  Efrain Oakley MD (Female Pelvic Medicine and Reconstructive Surgery)  Hallie Teran MD (Ophthalmology)    Assessment/Plan   Education and counseling provided:  Are appropriate based on today's review and evaluation  End-of-Life planning (with patient's consent)    Diagnoses and all orders for this visit:    1. Recurrent depression ([facility]    2. Hypercholesteremia  -     ezetimibe (ZETIA) 10 mg tablet; Take 1 Tab by mouth daily.     3. Anxiety    4. Medicare annual wellness visit, subsequent    5. Screening for alcoholism  -     [place] ANNUAL ALCOHOL SCREEN 15 MIN    6. Screening for depression  -     DEPRESSION SCREEN ANNUAL    7. Sprain of left ankle, unspecified ligament, sequela    8. Senile osteopenia  -     DEXA BONE DENSITY STUDY AXIAL; Future    9. Screening for malignant neoplasm of breast  -     LOBO MAMMO BI SCREENING INCL CAD; Future    10. Postmenopausal  -     DEXA BONE DENSITY STUDY AXIAL;  Future        Health Maintenance Due   Topic Date Due    GLAUCOMA SCREENING Q2Y  08/05/2008    MEDICARE YEARLY EXAM  03/14/2018

## 2019-08-28 ENCOUNTER — TELEPHONE (OUTPATIENT)
Dept: FAMILY MEDICINE CLINIC | Age: 74
End: 2019-08-28

## 2019-08-28 NOTE — TELEPHONE ENCOUNTER
Pt. Stated she is having symptoms of UTI. She stated dhe is having frequent urination and pressure.  Please assist.

## 2019-10-08 ENCOUNTER — HOSPITAL ENCOUNTER (OUTPATIENT)
Dept: LAB | Age: 74
Discharge: HOME | End: 2019-10-08
Payer: MEDICARE

## 2019-10-08 ENCOUNTER — OFFICE VISIT (OUTPATIENT)
Dept: FAMILY MEDICINE CLINIC | Age: 74
End: 2019-10-08

## 2019-10-08 VITALS
BODY MASS INDEX: 22.59 KG/M2 | DIASTOLIC BLOOD PRESSURE: 70 MMHG | TEMPERATURE: 98.4 F | RESPIRATION RATE: 20 BRPM | WEIGHT: 135.6 LBS | HEART RATE: 67 BPM | SYSTOLIC BLOOD PRESSURE: 141 MMHG | OXYGEN SATURATION: 97 % | HEIGHT: 65 IN

## 2019-10-08 DIAGNOSIS — N30.01 ACUTE CYSTITIS WITH HEMATURIA: ICD-10-CM

## 2019-10-08 DIAGNOSIS — N30.01 ACUTE CYSTITIS WITH HEMATURIA: Primary | ICD-10-CM

## 2019-10-08 LAB
BILIRUB UR QL STRIP: NEGATIVE
GLUCOSE UR-MCNC: NEGATIVE MG/DL
KETONES P FAST UR STRIP-MCNC: NEGATIVE MG/DL
PH UR STRIP: 7 [PH] (ref 4.6–8)
PROT UR QL STRIP: NEGATIVE
SP GR UR STRIP: 1.01 (ref 1–1.03)
UA UROBILINOGEN AMB POC: NORMAL (ref 0.2–1)
URINALYSIS CLARITY POC: NORMAL
URINALYSIS COLOR POC: YELLOW
URINE BLOOD POC: NORMAL
URINE LEUKOCYTES POC: NORMAL
URINE NITRITES POC: NEGATIVE

## 2019-10-08 PROCEDURE — 87086 URINE CULTURE/COLONY COUNT: CPT

## 2019-10-08 PROCEDURE — 87186 SC STD MICRODIL/AGAR DIL: CPT

## 2019-10-08 PROCEDURE — 87077 CULTURE AEROBIC IDENTIFY: CPT

## 2019-10-08 RX ORDER — CEFUROXIME AXETIL 500 MG/1
500 TABLET ORAL 2 TIMES DAILY
Qty: 20 TAB | Refills: 0 | Status: SHIPPED | OUTPATIENT
Start: 2019-10-08 | End: 2019-10-18

## 2019-10-08 NOTE — PROGRESS NOTES
Subjective     Patient ID:  Inna Rey is a 66 y.o. ( 1945) female who presents for the following:   Cough and Urinary Hesitancy      HPI   Presents for UTI follow-up. Symptoms started about 1 month ago. Mainly pelvic pressure. She was diagnosed with a UTI. Treated with 2 courses of Keflex, symptoms persisted. Then treated with Cipro and Pyridium, however she had itching after taking Cipro and Pyridium and was told to stop both. Last took an antibiotic 2 weeks ago. She felt better initially, but symptoms gradually returned. She does report that she has a prolapsed uterus or bladder (?). Going to see her OB/GYN next month. Review of Systems   Constitutional: Negative for chills, diaphoresis, fatigue and fever. Gastrointestinal: Negative for abdominal distention, abdominal pain, nausea and vomiting. Genitourinary: Positive for pelvic pain (\"Pressure\"). Negative for decreased urine volume, difficulty urinating, dysuria, flank pain, frequency, genital sores, hematuria, menstrual problem, urgency, vaginal bleeding, vaginal discharge and vaginal pain. Musculoskeletal: Negative for back pain. Neurological: Negative for weakness and light-headedness. Past Medical History, Past Surgery History, Allergies, Social History, and Family History were reviewed and updated. Past Medical History:   Diagnosis Date    Allergic rhinitis     Anxiety     Arthropathy     Atrophic vaginitis     Back pain     Chest pain, atypical     Cystocele     Depression     Flank pain     HTN (hypertension)     Hypercholesteremia     Hyperlipidemia     Leg pain     Menopause     Metabolic disease     Mitral valve prolapse     with mitral regurgitation    Osteopenia     Recurrent UTI     right heel pain     Uterine prolapse     UTI (urinary tract infection)      Past Surgical History:   Procedure Laterality Date    HX BREAST BIOPSY Left ?     Benign    HX CATARACT REMOVAL Right 2014     Family History   Problem Relation Age of Onset    Heart Disease Mother     Cancer Father     Heart Disease Brother     Heart Disease Sister      Social History     Socioeconomic History    Marital status:      Spouse name: Not on file    Number of children: Not on file    Years of education: Not on file    Highest education level: Not on file   Occupational History    Not on file   Social Needs    Financial resource strain: Not on file    Food insecurity:     Worry: Not on file     Inability: Not on file    Transportation needs:     Medical: Not on file     Non-medical: Not on file   Tobacco Use    Smoking status: Never Smoker    Smokeless tobacco: Never Used   Substance and Sexual Activity    Alcohol use: No     Alcohol/week: 0.0 standard drinks    Drug use: No    Sexual activity: Never   Lifestyle    Physical activity:     Days per week: Not on file     Minutes per session: Not on file    Stress: Not on file   Relationships    Social connections:     Talks on phone: Not on file     Gets together: Not on file     Attends Yazidism service: Not on file     Active member of club or organization: Not on file     Attends meetings of clubs or organizations: Not on file     Relationship status: Not on file    Intimate partner violence:     Fear of current or ex partner: Not on file     Emotionally abused: Not on file     Physically abused: Not on file     Forced sexual activity: Not on file   Other Topics Concern    Not on file   Social History Narrative    Not on file     Allergies   Allergen Reactions    Nitrofurantoin Hives and Rash    Atorvastatin Myalgia    Pyridium [Phenazopyridine] Itching    Sulfa (Sulfonamide Antibiotics) Rash     Current Outpatient Medications on File Prior to Visit   Medication Sig Dispense Refill    ezetimibe (ZETIA) 10 mg tablet Take 1 Tab by mouth daily. 90 Tab 2    sertraline (ZOLOFT) 100 mg tablet Take 1 Tab by mouth daily.  90 Tab 3    triamcinolone acetonide (KENALOG) 0.1 % ointment Apply  to affected area two (2) times a day. use thin layer 30 g 0    ALPRAZolam (XANAX) 0.25 mg tablet TAKE 1 TABLET BY MOUTH EVERY DAY AS NEEDED FOR ANXIETY 60 Tab 0    lidocaine (LIDODERM) 5 % Apply patch to the affected area for 12 hours a day and remove for 12 hours a day. 20 Each 0    azelastine (ASTELIN) 137 mcg (0.1 %) nasal spray 1 [place] by Both Nostrils route two (2) times a day. Use in each nostril as directed 1 Bottle 0    cyanocobalamin 1,000 mcg tablet Take  by mouth.  fluticasone (FLONASE) 50 mcg/actuation nasal spray by Nasal route.  loratadine (CLARITIN) 10 mg tablet Take  by mouth.  Cholecalciferol, Vitamin D3, (VITAMIN D3) 2,000 unit cap capsule Take  by mouth.  cranberry fruit extract (CRANBERRY PO) Take  by Mouth. No current facility-administered medications on file prior to visit. Objective     Visit Vitals  /70   Pulse 67   Temp 98.4 °F (36.9 °C) (Oral)   Resp 20   Ht 5' 5\" (1.651 m)   Wt 135 lb 9.6 oz (61.5 kg)   SpO2 97%   BMI 22.57 kg/m²     No LMP recorded. Patient is postmenopausal.    Physical Exam   Constitutional: She is oriented to person, place, and time. She appears well-developed and well-nourished. Non-toxic appearance. No distress. Cardiovascular: Normal rate, regular rhythm and normal heart sounds. No murmur heard. Pulmonary/Chest: Effort normal and breath sounds normal. No respiratory distress. She has no wheezes. She has no rales. Abdominal: Soft. Normal appearance and bowel sounds are normal. She exhibits no distension and no mass. There is no hepatosplenomegaly. There is tenderness (Mild) in the suprapubic area. There is no rebound, no guarding and no CVA tenderness. Neurological: She is alert and oriented to person, place, and time. Skin: She is not diaphoretic. Psychiatric: She has a normal mood and affect.          LABS   Component      Latest Ref Rng & Units 10/8/2019 3:46 PM   Color (UA POC)       Yellow   Clarity (UA POC)       Cloudy   Glucose (UA POC)      Negative Negative   Bilirubin (UA POC)      Negative Negative   Ketones (UA POC)      Negative Negative   Specific gravity (UA POC)      1.001 - 1.035 1.015   Blood (UA POC)      Negative 1+   pH (UA POC)      4.6 - 8.0 7.0   Protein (UA POC)      Negative Negative   Urobilinogen (UA POC)      0.2 - 1 0.2 mg/dL   Nitrites (UA POC)      Negative Negative   Leukocyte esterase (UA POC)      Negative 3+     TESTS      Assessment and Plan     1. Acute cystitis with hematuria  Start Ceftin. Further plan after urine culture results. - CULTURE, URINE; Future  - cefUROXime (CEFTIN) 500 mg tablet; Take 1 Tab by mouth two (2) times a day for 10 days. Dispense: 20 Tab; Refill: 0          Risks, benefits, and alternatives of the medications and treatment plan prescribed today were discussed, and patient expressed understanding. Printed after visit summary was given to patient and reviewed. All patient questions and concerns were addressed. Plan follow-up as discussed or as needed if any worsening symptoms or change in condition.            Signed electronically by Lamar Soni, SIOBHAN, MAGUI-BC

## 2019-10-08 NOTE — PATIENT INSTRUCTIONS
Urinary Tract Infection in Women: Care Instructions  Your Care Instructions    A urinary tract infection, or UTI, is a general term for an infection anywhere between the kidneys and the urethra (where urine comes out). Most UTIs are bladder infections. They often cause pain or burning when you urinate. UTIs are caused by bacteria and can be cured with antibiotics. Be sure to complete your treatment so that the infection goes away. Follow-up care is a key part of your treatment and safety. Be sure to make and go to all appointments, and call your doctor if you are having problems. It's also a good idea to know your test results and keep a list of the medicines you take. How can you care for yourself at home? · Take your antibiotics as directed. Do not stop taking them just because you feel better. You need to take the full course of antibiotics. · Drink extra water and other fluids for the next day or two. This may help wash out the bacteria that are causing the infection. (If you have kidney, heart, or liver disease and have to limit fluids, talk with your doctor before you increase your fluid intake.)  · Avoid drinks that are carbonated or have caffeine. They can irritate the bladder. · Urinate often. Try to empty your bladder each time. · To relieve pain, take a hot bath or lay a heating pad set on low over your lower belly or genital area. Never go to sleep with a heating pad in place. To prevent UTIs  · Drink plenty of water each day. This helps you urinate often, which clears bacteria from your system. (If you have kidney, heart, or liver disease and have to limit fluids, talk with your doctor before you increase your fluid intake.)  · Urinate when you need to. · Urinate right after you have sex. · Change sanitary pads often. · Avoid douches, bubble baths, feminine hygiene sprays, and other feminine hygiene products that have deodorants.   · After going to the bathroom, wipe from front to back.  When should you call for help? Call your doctor now or seek immediate medical care if:    · Symptoms such as fever, chills, nausea, or vomiting get worse or appear for the first time.     · You have new pain in your back just below your rib cage. This is called flank pain.     · There is new blood or pus in your urine.     · You have any problems with your antibiotic medicine.    Watch closely for changes in your health, and be sure to contact your doctor if:    · You are not getting better after taking an antibiotic for 2 days.     · Your symptoms go away but then come back. Where can you learn more? Go to http://alba-mariaelena.info/. Enter O631 in the search box to learn more about \"Urinary Tract Infection in Women: Care Instructions. \"  Current as of: December 19, 2018  Content Version: 12.2  © 6458-1138 [employer]. Care instructions adapted under license by [employer] (which disclaims liability or warranty for this information). If you have questions about a medical condition or this instruction, always ask your healthcare professional. [address] any warranty or liability for your use of this information.

## 2019-10-08 NOTE — PROGRESS NOTES
1. Have you been to the ER, urgent care clinic since your last visit? Hospitalized since your last visit? No    2. Have you seen or consulted any other health care providers outside of the [address] [place] [address] since your last visit? Include any pap smears or colon screening.  No

## 2019-10-11 LAB
BACTERIA SPEC CULT: ABNORMAL
SERVICE CMNT-IMP: ABNORMAL

## 2019-10-31 ENCOUNTER — OFFICE VISIT (OUTPATIENT)
Dept: FAMILY MEDICINE CLINIC | Age: 74
End: 2019-10-31

## 2019-10-31 VITALS
HEIGHT: 65 IN | OXYGEN SATURATION: 95 % | SYSTOLIC BLOOD PRESSURE: 127 MMHG | BODY MASS INDEX: 22.66 KG/M2 | HEART RATE: 72 BPM | TEMPERATURE: 97 F | WEIGHT: 136 LBS | DIASTOLIC BLOOD PRESSURE: 78 MMHG | RESPIRATION RATE: 18 BRPM

## 2019-10-31 DIAGNOSIS — G89.29 CHRONIC RIGHT SHOULDER PAIN: Primary | ICD-10-CM

## 2019-10-31 DIAGNOSIS — M25.511 CHRONIC RIGHT SHOULDER PAIN: Primary | ICD-10-CM

## 2019-10-31 DIAGNOSIS — E78.00 HYPERCHOLESTEREMIA: ICD-10-CM

## 2019-10-31 NOTE — PROGRESS NOTES
History of Present Illness  Carole Luna is a 66 y.o. female who presents today for management of    Chief Complaint   Patient presents with    Shoulder Pain       Shoulder Pain  Patient complains of right shoulder pain. The symptoms began several months ago Course of symptoms since onset has been waxing and waning but worse overall. Pain is described as overall severity = moderate, location: between neck and shoulder and worse with overhead movements. Symptoms were incited by no known event. Patient denies hematemesis, melena, fever. Therapy to date includes rest and ice. Problem List  Patient Active Problem List    Diagnosis Date Noted    Hypercholesteremia 09/11/2018    Cystocele, midline 09/10/2018    Recurrent depression ([address] 09/10/2018    Uterine prolapse     Recurrent UTI     Anxiety        Past Medical History  Past Medical History:   Diagnosis Date    Allergic rhinitis     Anxiety     Arthropathy     Atrophic vaginitis     Back pain     Chest pain, atypical     Cystocele     Depression     Flank pain     HTN (hypertension)     Hypercholesteremia     Hyperlipidemia     Leg pain     Menopause     Metabolic disease     Mitral valve prolapse     with mitral regurgitation    Osteopenia     Recurrent UTI     right heel pain     Uterine prolapse     UTI (urinary tract infection)         Surgical History  Past Surgical History:   Procedure Laterality Date    HX BREAST BIOPSY Left ? 2010    Benign    HX CATARACT REMOVAL Right 2014        Current Medications  Current Outpatient Medications   Medication Sig    ezetimibe (ZETIA) 10 mg tablet Take 1 Tab by mouth daily.  sertraline (ZOLOFT) 100 mg tablet Take 1 Tab by mouth daily.  triamcinolone acetonide (KENALOG) 0.1 % ointment Apply  to affected area two (2) times a day.  use thin layer    ALPRAZolam (XANAX) 0.25 mg tablet TAKE 1 TABLET BY MOUTH EVERY DAY AS NEEDED FOR ANXIETY    lidocaine (LIDODERM) 5 % Apply patch to the affected area for 12 hours a day and remove for 12 hours a day.  azelastine (ASTELIN) 137 mcg (0.1 %) nasal spray 1 [place] by Both Nostrils route two (2) times a day. Use in each nostril as directed    cyanocobalamin 1,000 mcg tablet Take  by mouth.  fluticasone (FLONASE) 50 mcg/actuation nasal spray by Nasal route.  loratadine (CLARITIN) 10 mg tablet Take  by mouth.  Cholecalciferol, Vitamin D3, (VITAMIN D3) 2,000 unit cap capsule Take  by mouth.  cranberry fruit extract (CRANBERRY PO) Take  by Mouth. No current facility-administered medications for this visit.         Allergies/Drug Reactions  Allergies   Allergen Reactions    Nitrofurantoin Hives and Rash    Atorvastatin Myalgia    Pyridium [Phenazopyridine] Itching    Sulfa (Sulfonamide Antibiotics) Rash        Family History  Family History   Problem Relation Age of Onset    Heart Disease Mother     Cancer Father     Heart Disease Brother     Heart Disease Sister         Social History  Social History     Socioeconomic History    Marital status:      Spouse name: Not on file    Number of children: Not on file    Years of education: Not on file    Highest education level: Not on file   Occupational History    Not on file   Social Needs    Financial resource strain: Not on file    Food insecurity:     Worry: Not on file     Inability: Not on file    Transportation needs:     Medical: Not on file     Non-medical: Not on file   Tobacco Use    Smoking status: Never Smoker    Smokeless tobacco: Never Used   Substance and Sexual Activity    Alcohol use: No     Alcohol/week: 0.0 standard drinks    Drug use: No    Sexual activity: Never   Lifestyle    Physical activity:     Days per week: Not on file     Minutes per session: Not on file    Stress: Not on file   Relationships    Social connections:     Talks on phone: Not on file     Gets together: Not on file     Attends [employer] service: Not on file     Active member of club or organization: Not on file     Attends meetings of clubs or organizations: Not on file     Relationship status: Not on file    Intimate partner violence:     Fear of current or ex partner: Not on file     Emotionally abused: Not on file     Physically abused: Not on file     Forced sexual activity: Not on file   Other Topics Concern    Not on file   Social History Narrative    Not on file       Review of Systems  Negative except as mentioned in HPI      Physical Exam  Vital signs:   Vitals:    10/31/19 1517   BP: 127/78   Pulse: 72   Resp: 18   Temp: 97 °F (36.1 °C)   TempSrc: Oral   SpO2: 95%   Weight: 136 lb (61.7 kg)   Height: 5' 5\" (1.651 m)       General: alert, oriented, not in distress  Neuro: AAOx3, CN's grossly intact  Skin: no visible abnormalities  Right shoulder:         Assessment/Plan:      1. Chronic right shoulder pain  - NSAID as needed  - REFERRAL TO ORTHOPEDICS    2. Hypercholesteremia  - continue Zetia  - LIPID PANEL; Future  - METABOLIC PANEL, COMPREHENSIVE; Future      Follow-up and Dispositions    · Return in about 6 weeks (around 12/11/2019) for ROV. I have discussed the diagnosis with the patient and the intended plan as seen in the above orders. The patient has received an after-visit summary and questions were answered concerning future plans. I have discussed medication side effects and warnings with the patient as well. I have reviewed the plan of care with the patient, accepted their input and they are in agreement with the treatment goals.        Juan Berman MD  October 31, 2019

## 2019-10-31 NOTE — PROGRESS NOTES
Kathy Mcgill is a 66 y.o. female who presents today for shoulder pain     1. Have you been to the ER, urgent care clinic since your last visit? Hospitalized since your last visit? no    2. Have you seen or consulted any other health care providers outside of the [address] [place] [address] since your last visit? Include any pap smears or colon screening. no     Health Maintenance reviewed.     Health Maintenance Due   Topic Date Due    GLAUCOMA SCREENING Q2Y  08/05/2008    Influenza Age 5 to Adult  08/01/2019

## 2019-11-01 ENCOUNTER — HOSPITAL ENCOUNTER (OUTPATIENT)
Dept: MAMMOGRAPHY | Age: 74
Discharge: HOME | End: 2019-11-01
Attending: FAMILY MEDICINE
Payer: MEDICARE

## 2019-11-01 ENCOUNTER — HOSPITAL ENCOUNTER (OUTPATIENT)
Dept: GENERAL RADIOLOGY | Age: 74
Discharge: HOME | End: 2019-11-01
Attending: INTERNAL MEDICINE
Payer: MEDICARE

## 2019-11-01 DIAGNOSIS — Z78.0 POSTMENOPAUSAL: ICD-10-CM

## 2019-11-01 DIAGNOSIS — Z12.31 VISIT FOR SCREENING MAMMOGRAM: ICD-10-CM

## 2019-11-01 DIAGNOSIS — M85.80 SENILE OSTEOPENIA: ICD-10-CM

## 2019-11-01 PROCEDURE — 77080 DXA BONE DENSITY AXIAL: CPT

## 2019-11-01 PROCEDURE — 77063 BREAST TOMOSYNTHESIS BI: CPT

## 2019-11-06 DIAGNOSIS — F41.9 ANXIETY: ICD-10-CM

## 2019-11-06 RX ORDER — ALPRAZOLAM 0.25 MG/1
TABLET ORAL
Qty: 60 TAB | Refills: 0 | OUTPATIENT
Start: 2019-11-06

## 2019-11-08 ENCOUNTER — OFFICE VISIT (OUTPATIENT)
Dept: FAMILY MEDICINE CLINIC | Age: 74
End: 2019-11-08

## 2019-11-08 VITALS
OXYGEN SATURATION: 99 % | HEART RATE: 76 BPM | RESPIRATION RATE: 18 BRPM | BODY MASS INDEX: 22.82 KG/M2 | WEIGHT: 137 LBS | TEMPERATURE: 95.5 F | DIASTOLIC BLOOD PRESSURE: 65 MMHG | HEIGHT: 65 IN | SYSTOLIC BLOOD PRESSURE: 118 MMHG

## 2019-11-08 DIAGNOSIS — F41.9 ANXIETY: ICD-10-CM

## 2019-11-08 RX ORDER — ALPRAZOLAM 0.25 MG/1
TABLET ORAL
Qty: 60 TAB | Refills: 0 | Status: SHIPPED | OUTPATIENT
Start: 2019-11-08 | End: 2020-09-02 | Stop reason: SDUPTHER

## 2019-11-08 NOTE — TELEPHONE ENCOUNTER
Pharmacy as well as patient called in and was requesting a refill of this medication. Pt was just seen on the 31st of October. Please advise.

## 2019-11-08 NOTE — PROGRESS NOTES
History of Present Illness  America Lemus is a 66 y.o. female who presents today for management of    Chief Complaint   Patient presents with    Anxiety       Anxiety Review:  Patient is seen for anxiety disorder. Current treatment includes Zoloft, Xanax and no other therapies. Ongoing symptoms include: psychomotor agitation, feelings of losing control. Patient denies: chest pain, shortness of breath, dizziness, suicidal ideation. Reported side effects from the treatment: none. Problem List  Patient Active Problem List    Diagnosis Date Noted    Hypercholesteremia 09/11/2018    Cystocele, midline 09/10/2018    Recurrent depression ([facility] [address] 09/10/2018    Uterine prolapse     Recurrent UTI     Anxiety        Past Medical History  Past Medical History:   Diagnosis Date    Allergic rhinitis     Anxiety     Arthropathy     Atrophic vaginitis     Back pain     Chest pain, atypical     Cystocele     Depression     Flank pain     HTN (hypertension)     Hypercholesteremia     Hyperlipidemia     Leg pain     Menopause     Metabolic disease     Mitral valve prolapse     with mitral regurgitation    Osteopenia     Recurrent UTI     right heel pain     Uterine prolapse     UTI (urinary tract infection)         Surgical History  Past Surgical History:   Procedure Laterality Date    HX BREAST BIOPSY Left ? 2010    Benign    HX CATARACT REMOVAL Right 2014        Current Medications  Current Outpatient Medications   Medication Sig    ALPRAZolam (XANAX) 0.25 mg tablet TAKE 1 TABLET BY MOUTH EVERY DAY AS NEEDED FOR ANXIETY    ezetimibe (ZETIA) 10 mg tablet Take 1 Tab by mouth daily.  sertraline (ZOLOFT) 100 mg tablet Take 1 Tab by mouth daily.  triamcinolone acetonide (KENALOG) 0.1 % ointment Apply  to affected area two (2) times a day. use thin layer    lidocaine (LIDODERM) 5 % Apply patch to the affected area for 12 hours a day and remove for 12 hours a day.     azelastine (ASTELIN) 137 mcg (0.1 %) nasal spray 1 [place]zone by Both Nostrils route two (2) times a day. Use in each nostril as directed    cyanocobalamin 1,000 mcg tablet Take  by mouth.  fluticasone (FLONASE) 50 mcg/actuation nasal spray by Nasal route.  loratadine (CLARITIN) 10 mg tablet Take  by mouth.  Cholecalciferol, Vitamin D3, (VITAMIN D3) 2,000 unit cap capsule Take  by mouth.  cranberry fruit extract (CRANBERRY PO) Take  by Mouth. No current facility-administered medications for this visit.         Allergies/Drug Reactions  Allergies   Allergen Reactions    Nitrofurantoin Hives and Rash    Atorvastatin Myalgia    Pyridium [Phenazopyridine] Itching    Sulfa (Sulfonamide Antibiotics) Rash        Family History  Family History   Problem Relation Age of Onset    Heart Disease Mother     Cancer Father     Heart Disease Brother     Heart Disease Sister         Social History  Social History     Socioeconomic History    Marital status:      Spouse name: Not on file    Number of children: Not on file    Years of education: Not on file    Highest education level: Not on file   Occupational History    Not on file   Social Needs    Financial resource strain: Not on file    Food insecurity:     Worry: Not on file     Inability: Not on file    Transportation needs:     Medical: Not on file     Non-medical: Not on file   Tobacco Use    Smoking status: Never Smoker    Smokeless tobacco: Never Used   Substance and Sexual Activity    Alcohol use: No     Alcohol/week: 0.0 standard drinks    Drug use: No    Sexual activity: Never   Lifestyle    Physical activity:     Days per week: Not on file     Minutes per session: Not on file    Stress: Not on file   Relationships    Social connections:     Talks on phone: Not on file     Gets together: Not on file     Attends Jehovah's witness service: Not on file     Active member of club or organization: Not on file     Attends meetings of clubs or organizations: Not on file     Relationship status: Not on file    Intimate partner violence:     Fear of current or ex partner: Not on file     Emotionally abused: Not on file     Physically abused: Not on file     Forced sexual activity: Not on file   Other Topics Concern    Not on file   Social History Narrative    Not on file       Review of Systems  Negative except as mentioned in HPI      Physical Exam  Vital signs:   Vitals:    11/08/19 1445   BP: 118/65   Pulse: 76   Resp: 18   Temp: 95.5 °F (35.3 °C)   TempSrc: Oral   SpO2: 99%   Weight: 137 lb (62.1 kg)   Height: 5' 5\" (1.651 m)       General: alert, oriented, not in distress  Eyes: clear conjunctivae, anicteric sclerae, full and equal ROMs  Chest/Lungs: clear breath sounds, no wheezing or crackles  Heart: normal rate, regular rhythm, no murmur  Extremities: no focal deformities, no edema  Neuro: AAOx3, CN's grossly intact  Skin: no visible abnormalities      Assessment/Plan:      ICD-10-CM ICD-9-CM    1. Anxiety F41.9 300.00 ALPRAZolam (XANAX) 0.25 mg tablet     Stable  Continue sertraline. Xanax refilled -  reviewed, no sign of misuse or diversion. Follow-up and Dispositions    · Return in about 1 month (around 12/11/2019) for ROV. I have discussed the diagnosis with the patient and the intended plan as seen in the above orders. The patient has received an after-visit summary and questions were answered concerning future plans. I have discussed medication side effects and warnings with the patient as well. I have reviewed the plan of care with the patient, accepted their input and they are in agreement with the treatment goals.        Maycol Brunner MD  November 8, 2019

## 2019-11-08 NOTE — TELEPHONE ENCOUNTER
Appt needed for controlled substance. Nurse advised patient, appt scheduled for 11/8/2019 2:45 PM. This encounter will be closed.

## 2019-11-08 NOTE — PROGRESS NOTES
Pippa Reyna is a 66 y.o. female who presents today for anxiety     1. Have you been to the ER, urgent care clinic since your last visit? Hospitalized since your last visit? no    2. Have you seen or consulted any other health care providers outside of the [address] [place] [address] since your last visit? Include any pap smears or colon screening. Orthopedics, today. Will start PT. Health Maintenance reviewed.     Health Maintenance Due   Topic Date Due    GLAUCOMA SCREENING Q2Y  08/05/2008

## 2019-12-05 ENCOUNTER — HOSPITAL ENCOUNTER (OUTPATIENT)
Dept: LAB | Age: 74
Discharge: HOME | End: 2019-12-05
Payer: MEDICARE

## 2019-12-05 DIAGNOSIS — E78.00 HYPERCHOLESTEREMIA: ICD-10-CM

## 2019-12-05 LAB
ALBUMIN SERPL-MCNC: 3.8 G/DL (ref 3.4–5)
ALBUMIN/GLOB SERPL: 1.1 {RATIO} (ref 0.8–1.7)
ALP SERPL-CCNC: 76 U/L (ref 45–117)
ALT SERPL-CCNC: 28 U/L (ref 13–56)
ANION GAP SERPL CALC-SCNC: 2 MMOL/L (ref 3–18)
AST SERPL-CCNC: 17 U/L (ref 10–38)
BILIRUB SERPL-MCNC: 1.5 MG/DL (ref 0.2–1)
BUN SERPL-MCNC: 16 MG/DL (ref 7–18)
BUN/CREAT SERPL: 21 (ref 12–20)
CALCIUM SERPL-MCNC: 9.2 MG/DL (ref 8.5–10.1)
CHLORIDE SERPL-SCNC: 107 MMOL/L (ref 100–111)
CO2 SERPL-SCNC: 30 MMOL/L (ref 21–32)
CREAT SERPL-MCNC: 0.78 MG/DL (ref 0.6–1.3)
GLOBULIN SER CALC-MCNC: 3.6 G/DL (ref 2–4)
GLUCOSE SERPL-MCNC: 81 MG/DL (ref 74–99)
POTASSIUM SERPL-SCNC: 4.1 MMOL/L (ref 3.5–5.5)
PROT SERPL-MCNC: 7.4 G/DL (ref 6.4–8.2)
SODIUM SERPL-SCNC: 139 MMOL/L (ref 136–145)

## 2019-12-05 PROCEDURE — 36415 COLL VENOUS BLD VENIPUNCTURE: CPT

## 2019-12-05 PROCEDURE — 80053 COMPREHEN METABOLIC PANEL: CPT

## 2019-12-05 PROCEDURE — 80061 LIPID PANEL: CPT

## 2019-12-06 LAB
CHOLEST SERPL-MCNC: 239 MG/DL
HDLC SERPL-MCNC: 73 MG/DL (ref 40–60)
HDLC SERPL: 3.3 {RATIO} (ref 0–5)
LDLC SERPL CALC-MCNC: 139.6 MG/DL (ref 0–100)
LIPID PROFILE,FLP: ABNORMAL
TRIGL SERPL-MCNC: 132 MG/DL (ref ?–150)
VLDLC SERPL CALC-MCNC: 26.4 MG/DL

## 2019-12-11 ENCOUNTER — OFFICE VISIT (OUTPATIENT)
Dept: FAMILY MEDICINE CLINIC | Age: 74
End: 2019-12-11

## 2019-12-11 VITALS
HEART RATE: 76 BPM | OXYGEN SATURATION: 96 % | RESPIRATION RATE: 16 BRPM | TEMPERATURE: 97 F | SYSTOLIC BLOOD PRESSURE: 134 MMHG | WEIGHT: 135 LBS | BODY MASS INDEX: 22.49 KG/M2 | DIASTOLIC BLOOD PRESSURE: 80 MMHG | HEIGHT: 65 IN

## 2019-12-11 DIAGNOSIS — E78.00 HYPERCHOLESTEREMIA: ICD-10-CM

## 2019-12-11 DIAGNOSIS — F33.9 RECURRENT DEPRESSION (HCC): ICD-10-CM

## 2019-12-11 DIAGNOSIS — R10.2 SUPRAPUBIC PRESSURE: Primary | ICD-10-CM

## 2019-12-11 DIAGNOSIS — M85.80 SENILE OSTEOPENIA: ICD-10-CM

## 2019-12-11 DIAGNOSIS — F41.9 ANXIETY: ICD-10-CM

## 2019-12-11 DIAGNOSIS — N81.11 CYSTOCELE, MIDLINE: ICD-10-CM

## 2019-12-11 DIAGNOSIS — Z87.440 HISTORY OF RECURRENT UTIS: ICD-10-CM

## 2019-12-11 NOTE — PROGRESS NOTES
History of Present Illness  Buster Renee is a 66 y.o. female who presents today for management of    Chief Complaint   Patient presents with    Anxiety    Urinary Pain       Urinary Tract Infection  Patient complains of suprapubic pressure. Onset was 1 week ago, unchanged since that time. Patient denies back pain, fever, vaginal discharge and urinary frequency or urgency. She has histroy of cystocele and recurrent UTI. She has tried pessaries which were helpful. Anxiety Review:  Patient is seen for anxiety disorder. Current treatment includes Zoloft, Xanax and no other therapies. Ongoing symptoms include: none. Patient denies: difficulty concentrating, suicidal ideation. Reported side effects from the treatment: none. Cardiovascular Review:  The patient has hyperlipidemia. Diet and Lifestyle: generally follows a low fat low cholesterol diet, generally follows a low sodium diet, exercises sporadically, nonsmoker  Home BP Monitoring: is not measured at home. Pertinent ROS: not taking medications regularly as instructed (stop taking Zetia few months ago), no medication side effects noted, no TIA's, no chest pain on exertion, no dyspnea on exertion, no swelling of ankles.      Problem List  Patient Active Problem List    Diagnosis Date Noted    Senile osteopenia 12/11/2019    Hypercholesteremia 09/11/2018    Cystocele, midline 09/10/2018    Recurrent depression ([facility] 09/10/2018    Uterine prolapse     Recurrent UTI     Anxiety        Past Medical History  Past Medical History:   Diagnosis Date    Allergic rhinitis     Anxiety     Arthropathy     Atrophic vaginitis     Back pain     Chest pain, atypical     Cystocele     Depression     Flank pain     HTN (hypertension)     Hypercholesteremia     Hyperlipidemia     Leg pain     Menopause     Metabolic disease     Mitral valve prolapse     with mitral regurgitation    Osteopenia     Recurrent UTI     right heel pain     Uterine prolapse     UTI (urinary tract infection)         Surgical History  Past Surgical History:   Procedure Laterality Date    HX BREAST BIOPSY Left ? 2010    Benign    HX CATARACT REMOVAL Right 2014        Current Medications  Current Outpatient Medications   Medication Sig    ALPRAZolam (XANAX) 0.25 mg tablet TAKE 1 TABLET BY MOUTH EVERY DAY AS NEEDED FOR ANXIETY    sertraline (ZOLOFT) 100 mg tablet Take 1 Tab by mouth daily.  triamcinolone acetonide (KENALOG) 0.1 % ointment Apply  to affected area two (2) times a day. use thin layer    lidocaine (LIDODERM) 5 % Apply patch to the affected area for 12 hours a day and remove for 12 hours a day.  azelastine (ASTELIN) 137 mcg (0.1 %) nasal spray 1 [place] by Both Nostrils route two (2) times a day. Use in each nostril as directed    cyanocobalamin 1,000 mcg tablet Take  by mouth.  fluticasone (FLONASE) 50 mcg/actuation nasal spray by Nasal route.  loratadine (CLARITIN) 10 mg tablet Take  by mouth.  Cholecalciferol, Vitamin D3, (VITAMIN D3) 2,000 unit cap capsule Take  by mouth.  cranberry fruit extract (CRANBERRY PO) Take  by Mouth. No current facility-administered medications for this visit.         Allergies/Drug Reactions  Allergies   Allergen Reactions    Nitrofurantoin Hives and Rash    Atorvastatin Myalgia    Pyridium [Phenazopyridine] Itching    Sulfa (Sulfonamide Antibiotics) Rash        Family History  Family History   Problem Relation Age of Onset    Heart Disease Mother     Cancer Father     Heart Disease Brother     Heart Disease Sister         Social History  Social History     Socioeconomic History    Marital status:      Spouse name: Not on file    Number of children: Not on file    Years of education: Not on file    Highest education level: Not on file   Occupational History    Not on file   Social Needs    Financial resource strain: Not on file    Food insecurity:     Worry: Not on file     Inability: Not on file    Transportation needs:     Medical: Not on file     Non-medical: Not on file   Tobacco Use    Smoking status: Never Smoker    Smokeless tobacco: Never Used   Substance and Sexual Activity    Alcohol use: No     Alcohol/week: 0.0 standard drinks    Drug use: No    Sexual activity: Never   Lifestyle    Physical activity:     Days per week: Not on file     Minutes per session: Not on file    Stress: Not on file   Relationships    Social connections:     Talks on phone: Not on file     Gets together: Not on file     Attends Mu-ism service: Not on file     Active member of club or organization: Not on file     Attends meetings of clubs or organizations: Not on file     Relationship status: Not on file    Intimate partner violence:     Fear of current or ex partner: Not on file     Emotionally abused: Not on file     Physically abused: Not on file     Forced sexual activity: Not on file   Other Topics Concern    Not on file   Social History Narrative    Not on file       Review of Systems  Review of Systems   Constitutional: Negative. HENT: Negative. Respiratory: Negative. Cardiovascular: Negative. Gastrointestinal: Positive for abdominal pain. Genitourinary: Negative. Musculoskeletal: Negative. Skin: Negative. Neurological: Negative. Psychiatric/Behavioral: The patient is nervous/anxious.           Physical Exam  Vital signs:   Vitals:    12/11/19 1201   BP: 134/80   Pulse: 76   Resp: 16   Temp: 97 °F (36.1 °C)   TempSrc: Oral   SpO2: 96%   Weight: 135 lb (61.2 kg)   Height: 5' 5\" (1.651 m)       General: alert, oriented, not in distress  Eyes: clear conjunctivae, anicteric sclerae, full and equal ROMs  Chest/Lungs: clear breath sounds, no wheezing or crackles  Heart: normal rate, regular rhythm, no murmur  Abdomen: soft, non-distended, non-tender, normal bowel sounds, no organomegaly, no masses, no CVA tenderness  Extremities: no focal deformities, no edema  Neuro: AAOx3, CN's grossly intact  Skin: no visible abnormalities    Laboratory/Tests:  Component      Latest Ref Rng & Units 12/5/2019 12/5/2019           1:17 PM  1:17 PM   Sodium      136 - 145 mmol/L  139   Potassium      3.5 - 5.5 mmol/L  4.1   Chloride      100 - 111 mmol/L  107   CO2      21 - 32 mmol/L  30   Anion gap      3.0 - 18 mmol/L  2 (L)   Glucose      74 - 99 mg/dL  81   BUN      7.0 - 18 MG/DL  16   Creatinine      0.6 - 1.3 MG/DL  0.78   BUN/Creatinine ratio      12 - 20    21 (H)   GFR est AA      >60 ml/min/1.73m2  >60   GFR est non-AA      >60 ml/min/1.73m2  >60   Calcium      8.5 - 10.1 MG/DL  9.2   Bilirubin, total      0.2 - 1.0 MG/DL  1.5 (H)   ALT (SGPT)      13 - 56 U/L  28   AST      10 - 38 U/L  17   Alk. phosphatase      45 - 117 U/L  76   Protein, total      6.4 - 8.2 g/dL  7.4   Albumin      3.4 - 5.0 g/dL  3.8   Globulin      2.0 - 4.0 g/dL  3.6   A-G Ratio      0.8 - 1.7    1.1   Cholesterol, total      <200 MG/ (H)    Triglyceride      <150 MG/    HDL Cholesterol      40 - 60 MG/DL 73 (H)    LDL, calculated      0 - 100 MG/.6 (H)    VLDL, calculated      MG/DL 26.4    CHOL/HDL Ratio      0 - 5.0   3.3        Assessment/Plan:    1. History of recurrent UTIs, Suprapubic pressure  - completed 7-day course of cephalexin 2 weeks ago  - AMB POC URINALYSIS DIP STICK AUTO W/O MICRO  - URINALYSIS W/ RFLX MICROSCOPIC; Future  - CULTURE, URINE; Future    2. Cystocele, midline  - REFERRAL TO GYNECOLOGY    3. Hypercholesteremia  - diet-controlled, now off Zetia  - CBC WITH AUTOMATED DIFF; Future  - LIPID PANEL; Future  - METABOLIC PANEL, COMPREHENSIVE; Future    4. Anxiety  - stable  - continue Zoloft and PRN Xanax  - CBC WITH AUTOMATED DIFF; Future  - TSH 3RD GENERATION; Future    [facility]. [facility]  - stable  - continue Zoloft  - CBC WITH AUTOMATED DIFF; Future  - TSH 3RD GENERATION; Future    6.  Senile osteopenia  - continue calcium and vitamin D  - CBC WITH AUTOMATED DIFF; Future        Follow-up and Dispositions    · Return in about 4 months (around 4/11/2020) for ROV. I have discussed the diagnosis with the patient and the intended plan as seen in the above orders. The patient has received an after-visit summary and questions were answered concerning future plans. I have discussed medication side effects and warnings with the patient as well. I have reviewed the plan of care with the patient, accepted their input and they are in agreement with the treatment goals.        Santosh Sierra MD  December 11, 2019

## 2019-12-11 NOTE — PROGRESS NOTES
Shira Copeland is a 66 y.o. female who presents today for  Urinary retention, pelvic soreness    1. Have you been to the ER, urgent care clinic since your last visit? Hospitalized since your last visit? no    2. Have you seen or consulted any other health care providers outside of the [address] [place] [address] since your last visit? Include any pap smears or colon screening. no     Health Maintenance reviewed.     Health Maintenance Due   Topic Date Due    GLAUCOMA SCREENING Q2Y  08/05/2008

## 2019-12-12 ENCOUNTER — HOSPITAL ENCOUNTER (OUTPATIENT)
Dept: LAB | Age: 74
Discharge: HOME | End: 2019-12-12
Payer: MEDICARE

## 2019-12-12 DIAGNOSIS — Z87.440 HISTORY OF RECURRENT UTIS: ICD-10-CM

## 2019-12-12 DIAGNOSIS — R10.2 SUPRAPUBIC PRESSURE: ICD-10-CM

## 2019-12-12 DIAGNOSIS — N39.0 ACUTE UTI: Primary | ICD-10-CM

## 2019-12-12 LAB
APPEARANCE UR: ABNORMAL
BACTERIA URNS QL MICRO: ABNORMAL /HPF
BILIRUB UR QL: NEGATIVE
COLOR UR: YELLOW
EPITH CASTS URNS QL MICRO: ABNORMAL /LPF (ref 0–5)
GLUCOSE UR STRIP.AUTO-MCNC: NEGATIVE MG/DL
HGB UR QL STRIP: ABNORMAL
KETONES UR QL STRIP.AUTO: NEGATIVE MG/DL
LEUKOCYTE ESTERASE UR QL STRIP.AUTO: ABNORMAL
NITRITE UR QL STRIP.AUTO: POSITIVE
PH UR STRIP: 6 [PH] (ref 5–8)
PROT UR STRIP-MCNC: ABNORMAL MG/DL
RBC #/AREA URNS HPF: ABNORMAL /HPF (ref 0–5)
SP GR UR REFRACTOMETRY: 1.02 (ref 1–1.03)
UROBILINOGEN UR QL STRIP.AUTO: 0.2 EU/DL (ref 0.2–1)
WBC URNS QL MICRO: ABNORMAL /HPF (ref 0–4)

## 2019-12-12 PROCEDURE — 87077 CULTURE AEROBIC IDENTIFY: CPT

## 2019-12-12 PROCEDURE — 81001 URINALYSIS AUTO W/SCOPE: CPT

## 2019-12-12 PROCEDURE — 87186 SC STD MICRODIL/AGAR DIL: CPT

## 2019-12-12 PROCEDURE — 87086 URINE CULTURE/COLONY COUNT: CPT

## 2019-12-12 RX ORDER — CIPROFLOXACIN 500 MG/1
500 TABLET ORAL 2 TIMES DAILY
Qty: 14 TAB | Refills: 0 | Status: SHIPPED | OUTPATIENT
Start: 2019-12-12 | End: 2019-12-19

## 2019-12-12 NOTE — PROGRESS NOTES
Urinalysis positive for possible infection. Prescribed macrobid x 7 days. Waiting for culture results.

## 2019-12-14 LAB
BACTERIA SPEC CULT: ABNORMAL
SERVICE CMNT-IMP: ABNORMAL

## 2020-01-29 ENCOUNTER — HOSPITAL ENCOUNTER (OUTPATIENT)
Dept: LAB | Age: 75
Discharge: HOME | End: 2020-01-29
Payer: MEDICARE

## 2020-01-29 ENCOUNTER — OFFICE VISIT (OUTPATIENT)
Dept: FAMILY MEDICINE CLINIC | Age: 75
End: 2020-01-29

## 2020-01-29 VITALS
SYSTOLIC BLOOD PRESSURE: 128 MMHG | WEIGHT: 137.2 LBS | OXYGEN SATURATION: 97 % | BODY MASS INDEX: 22.86 KG/M2 | RESPIRATION RATE: 19 BRPM | HEIGHT: 65 IN | HEART RATE: 83 BPM | TEMPERATURE: 97.3 F | DIASTOLIC BLOOD PRESSURE: 83 MMHG

## 2020-01-29 DIAGNOSIS — R35.0 FREQUENT URINATION: ICD-10-CM

## 2020-01-29 DIAGNOSIS — N30.00 ACUTE CYSTITIS WITHOUT HEMATURIA: ICD-10-CM

## 2020-01-29 DIAGNOSIS — R35.0 FREQUENT URINATION: Primary | ICD-10-CM

## 2020-01-29 LAB
BILIRUB UR QL STRIP: NEGATIVE
GLUCOSE UR-MCNC: NEGATIVE MG/DL
KETONES P FAST UR STRIP-MCNC: NEGATIVE MG/DL
PH UR STRIP: 6.5 [PH] (ref 4.6–8)
PROT UR QL STRIP: NORMAL
SP GR UR STRIP: 1.02 (ref 1–1.03)
UA UROBILINOGEN AMB POC: NORMAL (ref 0.2–1)
URINALYSIS CLARITY POC: NORMAL
URINALYSIS COLOR POC: NORMAL
URINE BLOOD POC: NORMAL
URINE LEUKOCYTES POC: NORMAL
URINE NITRITES POC: NEGATIVE

## 2020-01-29 PROCEDURE — 87086 URINE CULTURE/COLONY COUNT: CPT

## 2020-01-29 RX ORDER — CIPROFLOXACIN 500 MG/1
500 TABLET ORAL 2 TIMES DAILY
Qty: 14 TAB | Refills: 0 | Status: SHIPPED | OUTPATIENT
Start: 2020-01-29 | End: 2020-02-05

## 2020-01-29 NOTE — PROGRESS NOTES
Impression / Plan     Diagnoses and all orders for this visit:    1. Frequent urination  -     CULTURE, URINE; Future  -     AMB POC URINALYSIS DIP STICK AUTO W/O MICRO    2. Acute cystitis without hematuria  -     ciprofloxacin HCl (CIPRO) 500 mg tablet; Take 1 Tab by mouth two (2) times a day for 7 days. Plan: UTI, will treat empirically with Cipro 500 mg PO BID for 7 Days, defer Pyridium, and send for UCX. Follow Up IF FEVER or worsening symptoms here or with PCP for reevaluation. Follow-up and Dispositions    · Return if symptoms worsen or fail to improve. HPI Richie Castleman is a 66 y. o.female presenting for evaluation of UTI. Onset of symptoms for 1 week. No dysuria or urgency, however, mostly frequency. Similar presentation to prior UTIs. Mrs. Vani Lamar has a history of Cystocele, Uterine Prolapse, and Recurrent UTI's, plans to Follow Up with Urology. No abdomen pain. No FEVER or chills. No N/V. No vaginitis or DUB. Review of Systems   Constitutional: Negative. HENT: Negative. Eyes: Negative. Respiratory: Negative. Cardiovascular: Negative. Gastrointestinal: Negative. Genitourinary: Positive for frequency. Musculoskeletal: Negative. Skin: Negative. Neurological: Negative. Endo/Heme/Allergies: Negative. Psychiatric/Behavioral: Negative. Medications     Outpatient Medications Prior to Visit   Medication Sig Dispense Refill    ALPRAZolam (XANAX) 0.25 mg tablet TAKE 1 TABLET BY MOUTH EVERY DAY AS NEEDED FOR ANXIETY 60 Tab 0    sertraline (ZOLOFT) 100 mg tablet Take 1 Tab by mouth daily. 90 Tab 3    triamcinolone acetonide (KENALOG) 0.1 % ointment Apply  to affected area two (2) times a day. use thin layer 30 g 0    lidocaine (LIDODERM) 5 % Apply patch to the affected area for 12 hours a day and remove for 12 hours a day. 20 Each 0    azelastine (ASTELIN) 137 mcg (0.1 %) nasal spray 1 [place] by Both Nostrils route two (2) times a day.  Use in each nostril as directed 1 Bottle 0    cyanocobalamin 1,000 mcg tablet Take  by mouth.  fluticasone (FLONASE) 50 mcg/actuation nasal spray by Nasal route.  loratadine (CLARITIN) 10 mg tablet Take  by mouth.  Cholecalciferol, Vitamin D3, (VITAMIN D3) 2,000 unit cap capsule Take  by mouth.  cranberry fruit extract (CRANBERRY PO) Take  by Mouth. No facility-administered medications prior to visit. Allergies     Allergies   Allergen Reactions    Nitrofurantoin Hives and Rash    Atorvastatin Myalgia    Pyridium [Phenazopyridine] Itching    Sulfa (Sulfonamide Antibiotics) Rash     Problem List     Patient Active Problem List    Diagnosis Date Noted    Senile osteopenia 12/11/2019    Hypercholesteremia 09/11/2018    Cystocele, midline 09/10/2018    [facility]    Uterine prolapse     Recurrent UTI     Anxiety      Medical / Surgical / Family History     Past Medical History:   Diagnosis Date    Allergic rhinitis     Anxiety     Arthropathy     Atrophic vaginitis     Back pain     Chest pain, atypical     Cystocele     Depression     Flank pain     HTN (hypertension)     Hypercholesteremia     Hyperlipidemia     Leg pain     Menopause     Metabolic disease     Mitral valve prolapse     with mitral regurgitation    Osteopenia     Recurrent UTI     right heel pain     Uterine prolapse     UTI (urinary tract infection)      Past Surgical History:   Procedure Laterality Date    HX BREAST BIOPSY Left ? 2010    Benign    HX CATARACT REMOVAL Right 2014     Family History   Problem Relation Age of Onset    Heart Disease Mother     Cancer Father     Heart Disease Brother     Heart Disease Sister      Social History     Social History     Socioeconomic History    Marital status:      Spouse name: Not on file    Number of children: Not on file    Years of education: Not on file    Highest education level: Not on file   Occupational History    Not on file   Social Needs    Financial resource strain: Not on file    Food insecurity:     Worry: Not on file     Inability: Not on file    Transportation needs:     Medical: Not on file     Non-medical: Not on file   Tobacco Use    Smoking status: Never Smoker    Smokeless tobacco: Never Used   Substance and Sexual Activity    Alcohol use: No     Alcohol/week: 0.0 standard drinks    Drug use: No    Sexual activity: Never   Lifestyle    Physical activity:     Days per week: Not on file     Minutes per session: Not on file    Stress: Not on file   Relationships    Social connections:     Talks on phone: Not on file     Gets together: Not on file     Attends [employer] service: Not on file     Active member of club or organization: Not on file     Attends meetings of clubs or organizations: Not on file     Relationship status: Not on file    Intimate partner violence:     Fear of current or ex partner: Not on file     Emotionally abused: Not on file     Physically abused: Not on file     Forced sexual activity: Not on file   Other Topics Concern    Not on file   Social History Narrative    Not on file     ROS   Review of Systems    10 Element ROS negative unless specifically stated in History of Present Illness.      Health Maintenance     Health Maintenance   Topic Date Due    DTaP/Tdap/Td series (1 - Tdap) 08/05/1954    Shingrix Vaccine Age 50> (1 of 2) 08/05/1993    GLAUCOMA SCREENING Q2Y  08/05/2008    Pneumococcal 65+ years (1 of 1 - PPSV23) 06/11/2020 (Originally 8/5/2008)    Influenza Age 5 to Adult  10/30/2020 (Originally 8/1/2019)    MEDICARE YEARLY EXAM  06/11/2020    Bone Densitometry (Dexa) Screening  Completed     Physical Exam   /83 (BP 1 Location: Left arm, BP Patient Position: Sitting)   Pulse 83   Temp 97.3 °F (36.3 °C) (Oral)   Resp 19   Ht 5' 5\" (1.651 m)   Wt 137 lb 3.2 oz (62.2 kg)   SpO2 97%   BMI 22.83 kg/m²     Physical Exam  Constitutional: Appearance: Normal appearance. She is normal weight. She is not ill-appearing. HENT:      Head: Normocephalic and atraumatic. Nose: Nose normal.      Mouth/Throat:      Mouth: Mucous membranes are moist.      Pharynx: Oropharynx is clear. Eyes:      Extraocular Movements: Extraocular movements intact. Conjunctiva/sclera: Conjunctivae normal.      Pupils: Pupils are equal, round, and reactive to light. Cardiovascular:      Rate and Rhythm: Normal rate and regular rhythm. Pulses: Normal pulses. Heart sounds: Normal heart sounds. No murmur. Pulmonary:      Effort: Pulmonary effort is normal. No respiratory distress. Breath sounds: Normal breath sounds. Abdominal:      General: Abdomen is flat. Bowel sounds are normal. There is no distension. Palpations: Abdomen is soft. There is no mass. Tenderness: There is no abdominal tenderness. There is no guarding. Skin:     General: Skin is warm and dry. Findings: No rash. Neurological:      General: No focal deficit present. Mental Status: She is alert and oriented to person, place, and time. Mental status is at baseline. Gait: Gait normal.   Psychiatric:         Mood and Affect: Mood normal.         Behavior: Behavior normal.         Thought Content:  Thought content normal.         Judgment: Judgment normal.       Ortho Exam    Juliano Perez DO

## 2020-01-29 NOTE — PROGRESS NOTES
Room #    Chief Complaint:  Frequent urination    HPI:    Aurelia Freitas is a 66 y.o. female who presents today for c/o Frequent urination    1. Have you been to the ER, urgent care clinic since your last visit? Hospitalized since your last visit? NO When:    2. Have you seen or consulted any other health care providers outside of the [address] [place] [address] since your last visit? Include any pap smears or colon screening. NO  When :  Reason:    Health Maintenance reviewed Yes    Health Maintenance Due   Topic Date Due    DTaP/Tdap/Td series (1 - Tdap) 08/05/1954    Shingrix Vaccine Age 50> (1 of 2) 08/05/1993    GLAUCOMA SCREENING Q2Y  08/05/2008

## 2020-01-31 LAB
BACTERIA SPEC CULT: NORMAL
SERVICE CMNT-IMP: NORMAL

## 2020-04-06 ENCOUNTER — VIRTUAL VISIT (OUTPATIENT)
Dept: FAMILY MEDICINE CLINIC | Age: 75
End: 2020-04-06

## 2020-04-06 DIAGNOSIS — N39.0 ACUTE UTI: Primary | ICD-10-CM

## 2020-04-06 RX ORDER — CIPROFLOXACIN 250 MG/1
250 TABLET, FILM COATED ORAL 2 TIMES DAILY
Qty: 10 TAB | Refills: 0 | Status: SHIPPED | OUTPATIENT
Start: 2020-04-06 | End: 2020-04-11

## 2020-04-06 NOTE — PROGRESS NOTES
Simón Smith is a 66 y.o. female evaluated via telephone on 4/6/2020. Location of the patient: Home    Location of the provider: [facility]    Consent:  She and/or health care decision maker is aware that that she may receive a bill for this telephone service, depending on her insurance coverage, and has provided verbal consent to proceed: Yes    I affirm this is a Patient Initiated Episode with an Established Patient who has not had a related appointment within my department in the past 7 days or scheduled within the next 24 hours. Total Time: minutes: 11-20 minutes    History of Present Illness  Simón Smith is a 66 y.o. female who presents today for management of    Chief Complaint   Patient presents with    Urinary Frequency     x 1 week        Urinary Tract Infection  Patient complains of dysuria. Onset was 1 week ago, unchanged since that time. Patient complains of hypogastric pressure and frequency. . Patient denies fever and vaginal discharge. There is not any concern of sexual abuse. Patient does not have a history of recurrent UTI. Patient does not have a history of pyelonephritis. Problem List  Patient Active Problem List    Diagnosis Date Noted    Senile osteopenia 12/11/2019    Hypercholesteremia 09/11/2018    Cystocele, midline 09/10/2018    Recurrent depression ([facility] 09/10/2018    Uterine prolapse     Recurrent UTI     Anxiety        Current Medications  Current Outpatient Medications   Medication Sig    ciprofloxacin HCl (CIPRO) 250 mg tablet Take 1 Tab by mouth two (2) times a day for 5 days.  ALPRAZolam (XANAX) 0.25 mg tablet TAKE 1 TABLET BY MOUTH EVERY DAY AS NEEDED FOR ANXIETY    sertraline (ZOLOFT) 100 mg tablet Take 1 Tab by mouth daily.  triamcinolone acetonide (KENALOG) 0.1 % ointment Apply  to affected area two (2) times a day.  use thin layer    lidocaine (LIDODERM) 5 % Apply patch to the affected area for 12 hours a day and remove for 12 hours a day.  azelastine (ASTELIN) 137 mcg (0.1 %) nasal spray 1 [place] by Both Nostrils route two (2) times a day. Use in each nostril as directed    cyanocobalamin 1,000 mcg tablet Take  by mouth.  fluticasone (FLONASE) 50 mcg/actuation nasal spray by Nasal route.  loratadine (CLARITIN) 10 mg tablet Take  by mouth.  Cholecalciferol, Vitamin D3, (VITAMIN D3) 2,000 unit cap capsule Take  by mouth.  cranberry fruit extract (CRANBERRY PO) Take  by Mouth. No current facility-administered medications for this visit. Review of Systems  Review of Systems   Constitutional: Negative for chills, fever and weight loss. Respiratory: Negative. Cardiovascular: Negative. Gastrointestinal: Negative for abdominal pain, heartburn, nausea and vomiting. Genitourinary: Positive for dysuria, frequency and urgency. Negative for flank pain and hematuria. Musculoskeletal: Negative for back pain. Neurological: Negative. Psychiatric/Behavioral: Negative. Assessment/Plan:      1. Acute UTI  - ciprofloxacin HCl (CIPRO) 250 mg tablet; Take 1 Tab by mouth two (2) times a day for 5 days. Dispense: 10 Tab; Refill: 0  - advised to take probiotics during and up to 2 weeks after finishing the antibiotic treatment to prevent C diff diarrhea. Advise patient that she cancel routine appointment next week and reschedule in 2 months due to the COVID-19 pandemic. Patient verbalized understanding and agreed with plan. Follow-up and Dispositions    · Return if symptoms worsen or fail to improve.            Danay Patrick MD

## 2020-04-06 NOTE — PROGRESS NOTES
Simón Smith presents via telephone only for frequency,     Pt's current location: home    Coordination of Care:    1. Have you been to the ER, urgent care clinic since your last visit? Hospitalized since your last visit? no    2. Have you seen or consulted any other health care providers outside of the [address] [place] [address] since your last visit? Include any pap smears or colon screening. no    Consent:  She and/or health care decision maker is aware that that she may receive a bill for this telephone service, depending on her insurance coverage, and has provided verbal consent to proceed:  Yes

## 2020-04-29 ENCOUNTER — TELEPHONE (OUTPATIENT)
Dept: FAMILY MEDICINE CLINIC | Age: 75
End: 2020-04-29

## 2020-04-29 DIAGNOSIS — N39.0 RECURRENT UTI: Primary | ICD-10-CM

## 2020-04-29 NOTE — TELEPHONE ENCOUNTER
Pt. Stated she is still experiencing symptoms of UTI. Schedule a telephone visit? Pt. Does not have a camera phone or computer.  Please assist.

## 2020-04-30 NOTE — TELEPHONE ENCOUNTER
Pt stated that she still experiencing UTI symptoms and the antibiotic that was prescribed to her no longer working for her. Declined to make any type of appointment (doxy, mychart, telephone only)  when offered multiple times. Asking to be called back.

## 2020-05-01 ENCOUNTER — HOSPITAL ENCOUNTER (OUTPATIENT)
Dept: LAB | Age: 75
Discharge: HOME | End: 2020-05-01
Payer: MEDICARE

## 2020-05-01 ENCOUNTER — TELEPHONE (OUTPATIENT)
Dept: FAMILY MEDICINE CLINIC | Age: 75
End: 2020-05-01

## 2020-05-01 DIAGNOSIS — B37.49 CANDIDA UTI: ICD-10-CM

## 2020-05-01 DIAGNOSIS — N39.0 ACUTE UTI: Primary | ICD-10-CM

## 2020-05-01 DIAGNOSIS — N39.0 RECURRENT UTI: ICD-10-CM

## 2020-05-01 LAB
APPEARANCE UR: ABNORMAL
BACTERIA URNS QL MICRO: ABNORMAL /HPF
BILIRUB UR QL: NEGATIVE
COLOR UR: YELLOW
EPITH CASTS URNS QL MICRO: ABNORMAL /LPF (ref 0–5)
GLUCOSE UR STRIP.AUTO-MCNC: NEGATIVE MG/DL
HGB UR QL STRIP: ABNORMAL
KETONES UR QL STRIP.AUTO: NEGATIVE MG/DL
LEUKOCYTE ESTERASE UR QL STRIP.AUTO: ABNORMAL
MUCOUS THREADS URNS QL MICRO: ABNORMAL /LPF
NITRITE UR QL STRIP.AUTO: NEGATIVE
PH UR STRIP: 6 [PH] (ref 5–8)
PROT UR STRIP-MCNC: 100 MG/DL
RBC #/AREA URNS HPF: ABNORMAL /HPF (ref 0–5)
SP GR UR REFRACTOMETRY: 1.02 (ref 1–1.03)
UROBILINOGEN UR QL STRIP.AUTO: 0.2 EU/DL (ref 0.2–1)
WBC URNS QL MICRO: ABNORMAL /HPF (ref 0–4)
YEAST URNS QL MICRO: ABNORMAL
YEAST URNS QL MICRO: POSITIVE

## 2020-05-01 PROCEDURE — 87147 CULTURE TYPE IMMUNOLOGIC: CPT

## 2020-05-01 PROCEDURE — 87077 CULTURE AEROBIC IDENTIFY: CPT

## 2020-05-01 PROCEDURE — 87086 URINE CULTURE/COLONY COUNT: CPT

## 2020-05-01 PROCEDURE — 81001 URINALYSIS AUTO W/SCOPE: CPT

## 2020-05-01 PROCEDURE — 87186 SC STD MICRODIL/AGAR DIL: CPT

## 2020-05-01 RX ORDER — FLUCONAZOLE 200 MG/1
200 TABLET ORAL DAILY
Qty: 14 TAB | Refills: 0 | Status: SHIPPED | OUTPATIENT
Start: 2020-05-01 | End: 2020-05-15

## 2020-05-01 RX ORDER — CEFUROXIME AXETIL 500 MG/1
500 TABLET ORAL 2 TIMES DAILY
Qty: 14 TAB | Refills: 0 | Status: SHIPPED | OUTPATIENT
Start: 2020-05-01 | End: 2020-05-08

## 2020-05-01 NOTE — TELEPHONE ENCOUNTER
----- Message from Laurent Harding MD sent at 5/1/2020 11:55 AM EDT -----  Urinalysis consistent with UTI and yeast infection. Will treat with antibiotics and antifungal. Please inform patient.

## 2020-05-01 NOTE — PROGRESS NOTES
Urinalysis consistent with UTI and yeast infection. Will treat with antibiotics and antifungal. Please inform patient.

## 2020-05-01 NOTE — TELEPHONE ENCOUNTER
Spoke with patient and informed her UA showed UTI and Yeast. Informed her rx treatment sent to pharmacy- she verbalized understanding.

## 2020-05-04 LAB
BACTERIA SPEC CULT: ABNORMAL
CC UR VC: ABNORMAL
SERVICE CMNT-IMP: ABNORMAL

## 2020-06-20 DIAGNOSIS — F33.9 RECURRENT DEPRESSION (HCC): ICD-10-CM

## 2020-06-22 RX ORDER — SERTRALINE HYDROCHLORIDE 100 MG/1
TABLET, FILM COATED ORAL
Qty: 90 TAB | Refills: 3 | Status: SHIPPED | OUTPATIENT
Start: 2020-06-22 | End: 2021-06-13 | Stop reason: SDUPTHER

## 2020-06-23 ENCOUNTER — VIRTUAL VISIT (OUTPATIENT)
Dept: FAMILY MEDICINE CLINIC | Age: 75
End: 2020-06-23

## 2020-06-23 DIAGNOSIS — S50.862A INSECT BITE OF LEFT FOREARM, INITIAL ENCOUNTER: Primary | ICD-10-CM
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
## 2020-06-23 NOTE — PROGRESS NOTES
Alex Hollins is a 66 y.o. female who was seen by synchronous (real-time) audio-video technology using doxy. me on 6/23/2020. Location of the patient: Home    Location of the provider: [facility]    Consent:  She and/or health care decision maker is aware that that she may receive a bill for this telehealth service, depending on her insurance coverage, and has provided verbal consent to proceed: Yes    Subjective:   Alex Hollins is a 66 y.o. female who presents today for management of    Chief Complaint   Patient presents with    Insect Bite       Patient presents for evaluation of a bite wound to the left arm. History of bite: possible insect bite. Injury occurred 2 days ago. The patient reports pain and redness in the area. Problem List  Patient Active Problem List    Diagnosis Date Noted    Senile osteopenia 12/11/2019    Hypercholesteremia 09/11/2018    Cystocele, midline 09/10/2018    Recurrent depression ([address] 09/10/2018    Uterine prolapse     Recurrent UTI     Anxiety        Current Medications  Current Outpatient Medications   Medication Sig    sertraline (ZOLOFT) 100 mg tablet TAKE 1 TABLET BY MOUTH DAILY    ALPRAZolam (XANAX) 0.25 mg tablet TAKE 1 TABLET BY MOUTH EVERY DAY AS NEEDED FOR ANXIETY    triamcinolone acetonide (KENALOG) 0.1 % ointment Apply  to affected area two (2) times a day. use thin layer    lidocaine (LIDODERM) 5 % Apply patch to the affected area for 12 hours a day and remove for 12 hours a day.  azelastine (ASTELIN) 137 mcg (0.1 %) nasal spray 1 [place] by Both Nostrils route two (2) times a day. Use in each nostril as directed    cyanocobalamin 1,000 mcg tablet Take  by mouth.  fluticasone (FLONASE) 50 mcg/actuation nasal spray by Nasal route.  loratadine (CLARITIN) 10 mg tablet Take  by mouth.  Cholecalciferol, Vitamin D3, (VITAMIN D3) 2,000 unit cap capsule Take  by mouth.     cranberry fruit extract (CRANBERRY PO) Take by Mouth. No current facility-administered medications for this visit. Allergies/Drug Reactions  Allergies   Allergen Reactions    Nitrofurantoin Hives and Rash    Atorvastatin Myalgia    Pyridium [Phenazopyridine] Itching    Sulfa (Sulfonamide Antibiotics) Rash        Social History  Social History     Tobacco Use    Smoking status: Never Smoker    Smokeless tobacco: Never Used   Substance Use Topics    Alcohol use: No     Alcohol/week: 0.0 standard drinks    Drug use: No        Review of Systems  Review of Systems   Constitutional: Negative for chills, fever, malaise/fatigue and weight loss. Respiratory: Negative for shortness of breath. Cardiovascular: Negative for chest pain, palpitations, orthopnea and claudication. Musculoskeletal: Negative for back pain, myalgias and neck pain. Skin: Positive for rash. Negative for itching. Neurological: Negative for dizziness and headaches. Endo/Heme/Allergies: Does not bruise/bleed easily. Objective:     General: alert, cooperative, no distress   Mental  status: mental status: alert, oriented to person, place, and time, normal mood, behavior, speech, dress, motor activity, and thought processes   Resp: resp: normal effort and no respiratory distress   Neuro: neuro: no gross deficits   Skin: There is a raised lesion measuring approximately 2 cm in length on the dorsal aspect of left forearm. Due to this being a TeleHealth evaluation, many elements of the physical examination are unable to be assessed. Assessment & Plan:   1. Insect bite of left forearm, initial encounter  - no indication for antibiotics  - continue triamcinolone ointment BID until better  - warm compress  - APAP for pain      Follow-up and Dispositions    · Return if symptoms worsen or fail to improve. We discussed the expected course, resolution and complications of the diagnosis(es) in detail.   Medication risks, benefits, costs, interactions, and alternatives were discussed as indicated. I advised her to contact the office if her condition worsens, changes or fails to improve as anticipated. She expressed understanding with the diagnosis(es) and plan. Pursuant to the emergency declaration under the [facility] waiver authority and the [employer] Act, this Virtual  Visit was conducted, with patient's consent, to reduce the patient's risk of exposure to COVID-19 and provide continuity of care for an established patient. Services were provided through a video synchronous discussion virtually to substitute for in-person clinic visit.     Laurent Harding MD

## 2020-07-10 ENCOUNTER — TELEPHONE (OUTPATIENT)
Dept: FAMILY MEDICINE CLINIC | Age: 75
End: 2020-07-10

## 2020-07-10 NOTE — TELEPHONE ENCOUNTER
Nurse spoke with patient, attempted to get further details of how the patient is feeling. Pt stated she is tired, allergies off and on and just not feeling well. Nurse could not get any more details. Pt states she may change providers due to not being seen, nurse verbalized understanding, and advised the patient that [facility] at this time is not allowing anyone in the office with cold like symptoms, cough, fever, etc. Will send to provider with further review.

## 2020-07-10 NOTE — TELEPHONE ENCOUNTER
Pt. Stated she has been feeling fatigued and has been feeling bad for the past 2-3 weeks. She want to know if she could come in for an appt.  Please assist.

## 2020-07-10 NOTE — TELEPHONE ENCOUNTER
Patient declined to make a virtual visit and telephone appointment when offered and stated she wants to be seen face to face. Pt then added that she will just wait until the office is open to the public.

## 2020-07-14 ENCOUNTER — TELEPHONE (OUTPATIENT)
Dept: FAMILY MEDICINE CLINIC | Age: 75
End: 2020-07-14

## 2020-07-14 NOTE — TELEPHONE ENCOUNTER
Pt called in and informed me that she thins she has a UTI and she was wanting to know if she needed to come in for a test or if she needed to make a VV. Please advise.

## 2020-07-15 ENCOUNTER — VIRTUAL VISIT (OUTPATIENT)
Dept: FAMILY MEDICINE CLINIC | Age: 75
End: 2020-07-15

## 2020-07-15 DIAGNOSIS — N39.0 ACUTE UTI: Primary | ICD-10-CM

## 2020-07-15 RX ORDER — AMOXICILLIN 500 MG/1
500 CAPSULE ORAL 3 TIMES DAILY
Qty: 21 CAP | Refills: 0 | Status: SHIPPED | OUTPATIENT
Start: 2020-07-15 | End: 2020-07-22

## 2020-07-15 NOTE — PROGRESS NOTES
Fredy Mclaughlin is a 66 y.o. female who was seen by synchronous (real-time) audio-video technology using doxy. me on 7/15/2020. Location of the patient: Home    Location of the provider: [facility]    Consent:  She and/or health care decision maker is aware that that she may receive a bill for this telehealth service, depending on her insurance coverage, and has provided verbal consent to proceed: Yes    Subjective:   Fredy Mclaughlin is a 66 y.o. female who presents today for management of    Chief Complaint   Patient presents with    Suprapubic Pressure       Dysuria  Patient presents with urinary frequency  beginning 1 week ago. Fever has been absent. Other associated symptoms include: suprapubic pressure. Symptoms which are not present include: back pain, blood in urine, burning with urination, vaginal discharge, vaginal itching. UTI history: recent UTI with E faecalis 3 months ago, treated with. Problem List  Patient Active Problem List    Diagnosis Date Noted    Senile osteopenia 12/11/2019    Hypercholesteremia 09/11/2018    Cystocele, midline 09/10/2018    Recurrent depression ([facility] [address] [address] 09/10/2018    Uterine prolapse     Recurrent UTI     Anxiety        Current Medications  Current Outpatient Medications   Medication Sig    amoxicillin (AMOXIL) 500 mg capsule Take 1 Cap by mouth three (3) times daily for 7 days.  sertraline (ZOLOFT) 100 mg tablet TAKE 1 TABLET BY MOUTH DAILY    ALPRAZolam (XANAX) 0.25 mg tablet TAKE 1 TABLET BY MOUTH EVERY DAY AS NEEDED FOR ANXIETY    triamcinolone acetonide (KENALOG) 0.1 % ointment Apply  to affected area two (2) times a day. use thin layer    lidocaine (LIDODERM) 5 % Apply patch to the affected area for 12 hours a day and remove for 12 hours a day.  azelastine (ASTELIN) 137 mcg (0.1 %) nasal spray 1 [place] by Both Nostrils route two (2) times a day.  Use in each nostril as directed    cyanocobalamin 1,000 mcg tablet Take  by mouth.    fluticasone (FLONASE) 50 mcg/actuation nasal spray by Nasal route.  loratadine (CLARITIN) 10 mg tablet Take  by mouth.  Cholecalciferol, Vitamin D3, (VITAMIN D3) 2,000 unit cap capsule Take  by mouth.  cranberry fruit extract (CRANBERRY PO) Take  by Mouth. No current facility-administered medications for this visit. Allergies/Drug Reactions  Allergies   Allergen Reactions    Nitrofurantoin Hives and Rash    Atorvastatin Myalgia    Pyridium [Phenazopyridine] Itching    Sulfa (Sulfonamide Antibiotics) Rash        Social History  Social History     Tobacco Use    Smoking status: Never Smoker    Smokeless tobacco: Never Used   Substance Use Topics    Alcohol use: No     Alcohol/week: 0.0 standard drinks    Drug use: No        Review of Systems  Review of Systems   Constitutional: Positive for malaise/fatigue. Negative for chills, fever and weight loss. Respiratory: Negative. Cardiovascular: Negative. Gastrointestinal: Negative. Genitourinary: Positive for dysuria and frequency. Negative for flank pain, hematuria and urgency. Neurological: Negative. Objective:     General: alert, cooperative, no distress   Mental  status: mental status: alert, oriented to person, place, and time, normal mood, behavior, speech, dress, motor activity, and thought processes   Resp: resp: normal effort and no respiratory distress   Neuro: neuro: no gross deficits   Skin: skin: no discoloration or lesions of concern on visible areas     Due to this being a TeleHealth evaluation, many elements of the physical examination are unable to be assessed. Assessment & Plan:       ICD-10-CM ICD-9-CM    1. Acute UTI  N39.0 599.0 amoxicillin (AMOXIL) 500 mg capsule     Increase water intake    Follow-up and Dispositions    · Return in about 1 month (around 8/15/2020), or if symptoms worsen or fail to improve, for medicare wellness visit.          We discussed the expected course, resolution and complications of the diagnosis(es) in detail. Medication risks, benefits, costs, interactions, and alternatives were discussed as indicated. I advised her to contact the office if her condition worsens, changes or fails to improve as anticipated. She expressed understanding with the diagnosis(es) and plan. Pursuant to the emergency declaration under the [address] [place] [address] waiver authority and the [employer] Act, this Virtual  Visit was conducted, with patient's consent, to reduce the patient's risk of exposure to COVID-19 and provide continuity of care for an established patient. Services were provided through a video synchronous discussion virtually to substitute for in-person clinic visit.     Aspen Hernandez MD

## 2020-08-31 ENCOUNTER — TELEPHONE (OUTPATIENT)
Dept: FAMILY MEDICINE CLINIC | Age: 75
End: 2020-08-31

## 2020-08-31 NOTE — TELEPHONE ENCOUNTER
Pt called in and sated that she is experiencing some UTI symptoms and she was wanting to know if she could please come into the office for a F2F instead of a VV. Please advise.

## 2020-09-02 ENCOUNTER — OFFICE VISIT (OUTPATIENT)
Dept: FAMILY MEDICINE CLINIC | Age: 75
End: 2020-09-02

## 2020-09-02 ENCOUNTER — HOSPITAL ENCOUNTER (OUTPATIENT)
Dept: LAB | Age: 75
Discharge: HOME | End: 2020-09-02
Payer: MEDICARE

## 2020-09-02 VITALS
BODY MASS INDEX: 23.32 KG/M2 | SYSTOLIC BLOOD PRESSURE: 127 MMHG | HEIGHT: 65 IN | TEMPERATURE: 97.4 F | OXYGEN SATURATION: 99 % | HEART RATE: 68 BPM | RESPIRATION RATE: 16 BRPM | DIASTOLIC BLOOD PRESSURE: 84 MMHG | WEIGHT: 140 LBS

## 2020-09-02 DIAGNOSIS — F41.9 ANXIETY: ICD-10-CM

## 2020-09-02 DIAGNOSIS — F33.9 RECURRENT DEPRESSION (HCC): ICD-10-CM

## 2020-09-02 DIAGNOSIS — E78.00 HYPERCHOLESTEREMIA: ICD-10-CM

## 2020-09-02 DIAGNOSIS — N39.0 ACUTE UTI: ICD-10-CM

## 2020-09-02 DIAGNOSIS — B37.49 CANDIDURIA: ICD-10-CM

## 2020-09-02 DIAGNOSIS — R35.0 URINARY FREQUENCY: Primary | ICD-10-CM

## 2020-09-02 LAB
BILIRUB UR QL STRIP: NEGATIVE
GLUCOSE UR-MCNC: NEGATIVE MG/DL
KETONES P FAST UR STRIP-MCNC: ABNORMAL MG/DL
PH UR STRIP: 5.5 [PH] (ref 4.6–8)
PROT UR QL STRIP: ABNORMAL
SP GR UR STRIP: 5.5 (ref 1–1.03)
UA UROBILINOGEN AMB POC: ABNORMAL (ref 0.2–1)
URINALYSIS CLARITY POC: ABNORMAL
URINALYSIS COLOR POC: YELLOW
URINE BLOOD POC: ABNORMAL
URINE LEUKOCYTES POC: ABNORMAL
URINE NITRITES POC: POSITIVE

## 2020-09-02 PROCEDURE — 87077 CULTURE AEROBIC IDENTIFY: CPT

## 2020-09-02 PROCEDURE — 87186 SC STD MICRODIL/AGAR DIL: CPT

## 2020-09-02 PROCEDURE — 87086 URINE CULTURE/COLONY COUNT: CPT

## 2020-09-02 RX ORDER — ALPRAZOLAM 0.25 MG/1
TABLET ORAL
Qty: 60 TAB | Refills: 0 | Status: SHIPPED | OUTPATIENT
Start: 2020-09-02 | End: 2022-08-22 | Stop reason: SDUPTHER

## 2020-09-02 RX ORDER — CIPROFLOXACIN 500 MG/1
500 TABLET ORAL 2 TIMES DAILY
Qty: 14 TAB | Refills: 0 | Status: SHIPPED | OUTPATIENT
Start: 2020-09-02 | End: 2020-09-09

## 2020-09-02 RX ORDER — FLUCONAZOLE 200 MG/1
200 TABLET ORAL DAILY
Qty: 14 TAB | Refills: 0 | Status: SHIPPED | OUTPATIENT
Start: 2020-09-02 | End: 2020-09-16

## 2020-09-02 NOTE — PROGRESS NOTES
History of Present Illness  Irineo Dominguez is a 66 y.o. female who presents today for management of    Chief Complaint   Patient presents with    Urinary Frequency       Dysuria  Patient presents with dysuria, urinary frequency  beginning 1 week ago. Fever has been absent. Other associated symptoms include: urinary urgency. Symptoms which are not present include: blood in urine, diarrhea, vaginal discharge, vomiting. UTI history: recent UTI 1 month ago. Depression Review:  Patient is seen for followup of anxiety and depression. Treatment includes Zoloft, Xanax and no other therapies. Ongoing symptoms include insomnia, psychomotor agitation. She denies depressed mood, weight loss, weight gain, fatigue, hopelessness and impaired memory. She experiences the following side effects from the treatment: none. Problem List  Patient Active Problem List    Diagnosis Date Noted    Senile osteopenia 12/11/2019    Hypercholesteremia 09/11/2018    Cystocele, midline 09/10/2018    Recurrent depression ([facility] [address] 09/10/2018    Uterine prolapse     Recurrent UTI     Anxiety        Current Medications  Current Outpatient Medications   Medication Sig    ciprofloxacin HCl (CIPRO) 500 mg tablet Take 1 Tab by mouth two (2) times a day for 7 days.  fluconazole (DIFLUCAN) 200 mg tablet Take 1 Tab by mouth daily for 14 doses.  ALPRAZolam (XANAX) 0.25 mg tablet TAKE 1 TABLET BY MOUTH EVERY DAY AS NEEDED FOR ANXIETY    sertraline (ZOLOFT) 100 mg tablet TAKE 1 TABLET BY MOUTH DAILY    triamcinolone acetonide (KENALOG) 0.1 % ointment Apply  to affected area two (2) times a day. use thin layer    lidocaine (LIDODERM) 5 % Apply patch to the affected area for 12 hours a day and remove for 12 hours a day.  azelastine (ASTELIN) 137 mcg (0.1 %) nasal spray 1 [place] by Both Nostrils route two (2) times a day. Use in each nostril as directed    cyanocobalamin 1,000 mcg tablet Take  by mouth.     fluticasone (FLONASE) 50 mcg/actuation nasal spray by Nasal route.  loratadine (CLARITIN) 10 mg tablet Take  by mouth.  Cholecalciferol, Vitamin D3, (VITAMIN D3) 2,000 unit cap capsule Take  by mouth.  cranberry fruit extract (CRANBERRY PO) Take  by Mouth. No current facility-administered medications for this visit. Allergies/Drug Reactions  Allergies   Allergen Reactions    Nitrofurantoin Hives and Rash    Atorvastatin Myalgia    Pyridium [Phenazopyridine] Itching    Sulfa (Sulfonamide Antibiotics) Rash        Review of Systems  Review of Systems   Constitutional: Negative for chills, fever, malaise/fatigue and weight loss. Respiratory: Negative. Cardiovascular: Negative. Gastrointestinal: Negative. Genitourinary: Positive for dysuria, frequency and urgency. Negative for flank pain and hematuria. Neurological: Negative. Psychiatric/Behavioral: Negative for depression. The patient is not nervous/anxious. Physical Exam  Vital signs:   Vitals:    09/02/20 0857   BP: 127/84   Pulse: 68   Resp: 16   Temp: 97.4 °F (36.3 °C)   TempSrc: Skin   SpO2: 99%   Weight: 140 lb (63.5 kg)   Height: 5' 5\" (1.651 m)       General: alert, oriented, not in distress  Head: scalp normal, atraumatic  Eyes: pupils are equal and reactive, full and intact EOM's  Neck: supple, no JVD, no lymphadenopathy, non-palpable thyroid  Chest/Lungs: clear breath sounds, no wheezing or crackles  Heart: normal rate, regular rhythm, no murmur  Abdomen: soft, non-distended, non-tender, normal bowel sounds, no organomegaly, no masses  Extremities: no focal deformities, no edema  Skin: no active skin lesions    Assessment/Plan:    1. Candiduria  - fluconazole (DIFLUCAN) 200 mg tablet; Take 1 Tab by mouth daily for 14 doses. Dispense: 14 Tab; Refill: 0    2. Acute UTI  - CULTURE, URINE; Future  - ciprofloxacin HCl (CIPRO) 500 mg tablet; Take 1 Tab by mouth two (2) times a day for 7 days. Dispense: 14 Tab; Refill: 0    3.  Recurrent depression ([facility] [address]  - stable  - continue sertraline    4. Hypercholesteremia  - low fat diet    5. Anxiety  - refilled Xanax        I have discussed the diagnosis with the patient and the intended plan as seen in the above orders. The patient has received an after-visit summary and questions were answered concerning future plans. I have discussed medication side effects and warnings with the patient as well. I have reviewed the plan of care with the patient, accepted their input and they are in agreement with the treatment goals.        Juan Berman MD  September 2, 2020

## 2020-09-02 NOTE — PROGRESS NOTES
Mariia Stover presents today for urinary frequency and pressure   - Is someone accompanying this pt? no  - Is the patient using any durable medical equipment during office visit? no    Coordination of Care:  1. Have you been to the ER, urgent care clinic since your last visit? Hospitalized since your last visit? Urgent care for UTI- about 1 month ago     2. Have you seen or consulted any other health care providers outside of the [address] [place] [address] since your last visit? Include any pap smears or colon screening.  No

## 2020-09-05 LAB
BACTERIA SPEC CULT: ABNORMAL
CC UR VC: ABNORMAL
SERVICE CMNT-IMP: ABNORMAL

## 2021-03-05 ENCOUNTER — HOSPITAL ENCOUNTER (OUTPATIENT)
Dept: MAMMOGRAPHY | Age: 76
Discharge: HOME | End: 2021-03-05
Attending: INTERNAL MEDICINE
Payer: MEDICARE

## 2021-03-05 DIAGNOSIS — Z12.31 ENCOUNTER FOR SCREENING MAMMOGRAM FOR BREAST CANCER: ICD-10-CM

## 2021-03-05 DIAGNOSIS — Z12.31 VISIT FOR SCREENING MAMMOGRAM: ICD-10-CM

## 2021-03-05 PROCEDURE — 77063 BREAST TOMOSYNTHESIS BI: CPT

## 2021-06-13 DIAGNOSIS — F33.9 RECURRENT DEPRESSION (HCC): ICD-10-CM

## 2021-06-13 RX ORDER — SERTRALINE HYDROCHLORIDE 100 MG/1
TABLET, FILM COATED ORAL
Qty: 90 TABLET | Refills: 1 | Status: SHIPPED | OUTPATIENT
Start: 2021-06-13 | End: 2021-12-20

## 2021-08-17 ENCOUNTER — HOSPITAL ENCOUNTER (OUTPATIENT)
Dept: LAB | Age: 76
Discharge: HOME | End: 2021-08-17
Payer: MEDICARE

## 2021-08-17 ENCOUNTER — OFFICE VISIT (OUTPATIENT)
Dept: FAMILY MEDICINE CLINIC | Age: 76
End: 2021-08-17
Payer: MEDICARE

## 2021-08-17 VITALS
HEART RATE: 74 BPM | HEIGHT: 65 IN | RESPIRATION RATE: 16 BRPM | SYSTOLIC BLOOD PRESSURE: 138 MMHG | OXYGEN SATURATION: 97 % | DIASTOLIC BLOOD PRESSURE: 70 MMHG | TEMPERATURE: 98 F | BODY MASS INDEX: 23.49 KG/M2 | WEIGHT: 141 LBS

## 2021-08-17 DIAGNOSIS — Z11.59 NEED FOR HEPATITIS C SCREENING TEST: ICD-10-CM

## 2021-08-17 DIAGNOSIS — F33.9 RECURRENT DEPRESSION (HCC): Primary | ICD-10-CM

## 2021-08-17 DIAGNOSIS — E78.00 HYPERCHOLESTEREMIA: ICD-10-CM

## 2021-08-17 DIAGNOSIS — H61.23 BILATERAL IMPACTED CERUMEN: ICD-10-CM

## 2021-08-17 DIAGNOSIS — Z00.00 MEDICARE ANNUAL WELLNESS VISIT, SUBSEQUENT: ICD-10-CM

## 2021-08-17 DIAGNOSIS — F33.9 RECURRENT DEPRESSION (HCC): ICD-10-CM

## 2021-08-17 DIAGNOSIS — L28.2 PRURITIC RASH: ICD-10-CM

## 2021-08-17 LAB
ALBUMIN SERPL-MCNC: 3.7 G/DL (ref 3.4–5)
ALBUMIN/GLOB SERPL: 1 {RATIO} (ref 0.8–1.7)
ALP SERPL-CCNC: 86 U/L (ref 45–117)
ALT SERPL-CCNC: 27 U/L (ref 13–56)
ANION GAP SERPL CALC-SCNC: 2 MMOL/L (ref 3–18)
AST SERPL-CCNC: 20 U/L (ref 10–38)
BASOPHILS # BLD: 0 K/UL (ref 0–0.1)
BASOPHILS NFR BLD: 1 % (ref 0–2)
BILIRUB SERPL-MCNC: 1.1 MG/DL (ref 0.2–1)
BUN SERPL-MCNC: 16 MG/DL (ref 7–18)
BUN/CREAT SERPL: 23 (ref 12–20)
CALCIUM SERPL-MCNC: 8.6 MG/DL (ref 8.5–10.1)
CHLORIDE SERPL-SCNC: 108 MMOL/L (ref 100–111)
CHOLEST SERPL-MCNC: 224 MG/DL
CO2 SERPL-SCNC: 30 MMOL/L (ref 21–32)
CREAT SERPL-MCNC: 0.71 MG/DL (ref 0.6–1.3)
DIFFERENTIAL METHOD BLD: ABNORMAL
EOSINOPHIL # BLD: 0.4 K/UL (ref 0–0.4)
EOSINOPHIL NFR BLD: 5 % (ref 0–5)
ERYTHROCYTE [DISTWIDTH] IN BLOOD BY AUTOMATED COUNT: 13.2 % (ref 11.6–14.5)
GLOBULIN SER CALC-MCNC: 3.8 G/DL (ref 2–4)
GLUCOSE SERPL-MCNC: 83 MG/DL (ref 74–99)
HCT VFR BLD AUTO: 42 % (ref 35–45)
HDLC SERPL-MCNC: 68 MG/DL (ref 40–60)
HDLC SERPL: 3.3 {RATIO} (ref 0–5)
HGB BLD-MCNC: 13.7 G/DL (ref 12–16)
LDLC SERPL CALC-MCNC: 122.6 MG/DL (ref 0–100)
LIPID PROFILE,FLP: ABNORMAL
LYMPHOCYTES # BLD: 1.3 K/UL (ref 0.9–3.6)
LYMPHOCYTES NFR BLD: 17 % (ref 21–52)
MCH RBC QN AUTO: 30.7 PG (ref 24–34)
MCHC RBC AUTO-ENTMCNC: 32.6 G/DL (ref 31–37)
MCV RBC AUTO: 94.2 FL (ref 74–97)
MONOCYTES # BLD: 0.8 K/UL (ref 0.05–1.2)
MONOCYTES NFR BLD: 10 % (ref 3–10)
NEUTS SEG # BLD: 5.4 K/UL (ref 1.8–8)
NEUTS SEG NFR BLD: 68 % (ref 40–73)
PLATELET # BLD AUTO: 198 K/UL (ref 135–420)
PMV BLD AUTO: 11.2 FL (ref 9.2–11.8)
POTASSIUM SERPL-SCNC: 4.4 MMOL/L (ref 3.5–5.5)
PROT SERPL-MCNC: 7.5 G/DL (ref 6.4–8.2)
RBC # BLD AUTO: 4.46 M/UL (ref 4.2–5.3)
SODIUM SERPL-SCNC: 140 MMOL/L (ref 136–145)
TRIGL SERPL-MCNC: 167 MG/DL (ref ?–150)
TSH SERPL DL<=0.05 MIU/L-ACNC: 1.13 UIU/ML (ref 0.36–3.74)
VLDLC SERPL CALC-MCNC: 33.4 MG/DL
WBC # BLD AUTO: 8 K/UL (ref 4.6–13.2)

## 2021-08-17 PROCEDURE — G8536 NO DOC ELDER MAL SCRN: HCPCS | Performed by: INTERNAL MEDICINE

## 2021-08-17 PROCEDURE — 85025 COMPLETE CBC W/AUTO DIFF WBC: CPT

## 2021-08-17 PROCEDURE — G0439 PPPS, SUBSEQ VISIT: HCPCS | Performed by: INTERNAL MEDICINE

## 2021-08-17 PROCEDURE — 80053 COMPREHEN METABOLIC PANEL: CPT

## 2021-08-17 PROCEDURE — 99213 OFFICE O/P EST LOW 20 MIN: CPT | Performed by: INTERNAL MEDICINE

## 2021-08-17 PROCEDURE — G9717 DOC PT DX DEP/BP F/U NT REQ: HCPCS | Performed by: INTERNAL MEDICINE

## 2021-08-17 PROCEDURE — 36415 COLL VENOUS BLD VENIPUNCTURE: CPT

## 2021-08-17 PROCEDURE — 1090F PRES/ABSN URINE INCON ASSESS: CPT | Performed by: INTERNAL MEDICINE

## 2021-08-17 PROCEDURE — 86803 HEPATITIS C AB TEST: CPT

## 2021-08-17 PROCEDURE — 1101F PT FALLS ASSESS-DOCD LE1/YR: CPT | Performed by: INTERNAL MEDICINE

## 2021-08-17 PROCEDURE — 69210 REMOVE IMPACTED EAR WAX UNI: CPT | Performed by: INTERNAL MEDICINE

## 2021-08-17 PROCEDURE — 84443 ASSAY THYROID STIM HORMONE: CPT

## 2021-08-17 PROCEDURE — G8399 PT W/DXA RESULTS DOCUMENT: HCPCS | Performed by: INTERNAL MEDICINE

## 2021-08-17 PROCEDURE — G8420 CALC BMI NORM PARAMETERS: HCPCS | Performed by: INTERNAL MEDICINE

## 2021-08-17 PROCEDURE — 80061 LIPID PANEL: CPT

## 2021-08-17 PROCEDURE — G8427 DOCREV CUR MEDS BY ELIG CLIN: HCPCS | Performed by: INTERNAL MEDICINE

## 2021-08-17 NOTE — PATIENT INSTRUCTIONS

## 2021-08-17 NOTE — PROGRESS NOTES
Vijaya Roberson presents today for itching all over body for about 1 month   - Is someone accompanying this pt? no  - Is the patient using any durable medical equipment during office visit? no    Coordination of Care:  1. Have you been to the ER, urgent care clinic since your last visit? Hospitalized since your last visit? no    2. Have you seen or consulted any other health care providers outside of the [address] [place] [address] since your last visit? Include any pap smears or colon screening.  No

## 2021-08-17 NOTE — PROGRESS NOTES
History of Present Illness  Ramon Inman is a 64 y.o. female who presents today for management of    Chief Complaint   Patient presents with    Skin Problem     itching, x 1 month    Annual Wellness Visit       Rash  Patient complains of rash involving the armsbilateral, trunk. Rash started 2 weeks ago. Appearance of rash at onset: Color of lesion(s): pink, Texture of lesion(s): raised. Rash comes and goes. Discomfort associated with rash: pruritic. Associated symptoms: no associated symptoms. Denies: fever, cough, congestion, headache, arthralgia. Patient has not had previous evaluation of rash. Patient has not had previous treatment. Patient has not had contacts with similar rash. Patient has not identified precipitant. Patient has not had new exposures (soaps, lotions, laundry detergents, foods, medications, plants, insects or animals.)    Depression Review:  Patient is seen for followup of depression. Treatment includes SSRI and no other therapies. Ongoing symptoms include none. She denies depressed mood, weight loss, weight gain, hypersomnia, feelings of worthlessness/guilt, difficulty concentrating, hopelessness and impaired memory. She experiences the following side effects from the treatment: none. Problem List  Patient Active Problem List    Diagnosis Date Noted    Senile osteopenia 12/11/2019    Hypercholesteremia 09/11/2018    Cystocele, midline 09/10/2018    Recurrent depression ([facility] [address] 09/10/2018    Uterine prolapse     Recurrent UTI     Anxiety        Current Medications  Current Outpatient Medications   Medication Sig    sertraline (ZOLOFT) 100 mg tablet TAKE 1 TABLET BY MOUTH DAILY    ALPRAZolam (XANAX) 0.25 mg tablet TAKE 1 TABLET BY MOUTH EVERY DAY AS NEEDED FOR ANXIETY    cyanocobalamin 1,000 mcg tablet Take  by mouth.  loratadine (CLARITIN) 10 mg tablet Take  by mouth.  Cholecalciferol, Vitamin D3, (VITAMIN D3) 2,000 unit cap capsule Take  by mouth.     cranberry fruit extract (CRANBERRY PO) Take  by Mouth. No current facility-administered medications for this visit. Allergies/Drug Reactions  Allergies   Allergen Reactions    Nitrofurantoin Hives and Rash    Atorvastatin Myalgia    Pyridium [Phenazopyridine] Itching    Sulfa (Sulfonamide Antibiotics) Rash        Review of Systems  Review of Systems   Constitutional: Negative. Respiratory: Negative. Cardiovascular: Negative. Gastrointestinal: Negative. Musculoskeletal: Negative. Skin: Positive for itching and rash. Neurological: Negative.          Physical Exam  Vital signs:   Vitals:    08/17/21 0932   BP: 138/70   Pulse: 74   Resp: 16   Temp: 98 °F (36.7 °C)   TempSrc: Temporal   SpO2: 97%   Weight: 141 lb (64 kg)   Height: 5' 5\" (1.651 m)       General: alert, oriented, not in distress  Head: scalp normal, atraumatic  Eyes: pupils are equal and reactive, full and intact EOM's  Ears: impacted cerumen on both ears  Neck: supple, no JVD, no lymphadenopathy, non-palpable thyroid  Chest/Lungs: clear breath sounds, no wheezing or crackles  Heart: normal rate, regular rhythm, no murmur  Extremities: no focal deformities, no edema  Skin: multiple scratch marks on the trunk and back, no rash noted      Laboratory/Tests:  Lab Results   Component Value Date/Time    WBC 6.1 04/25/2019 08:26 AM    HGB 13.2 04/25/2019 08:26 AM    HCT 42.2 04/25/2019 08:26 AM    PLATELET 727 83/32/0500 08:26 AM    MCV 93.2 04/25/2019 08:26 AM     Lab Results   Component Value Date/Time    Cholesterol, total 239 (H) 12/05/2019 01:17 PM    HDL Cholesterol 73 (H) 12/05/2019 01:17 PM    LDL, calculated 139.6 (H) 12/05/2019 01:17 PM    Triglyceride 132 12/05/2019 01:17 PM    CHOL/HDL Ratio 3.3 12/05/2019 01:17 PM     Lab Results   Component Value Date/Time    TSH 1.04 04/25/2019 08:26 AM    Triiodothyronine (T3), free 2.5 04/25/2019 08:26 AM    T4, Free 0.8 04/25/2019 08:26 AM      Lab Results   Component Value Date/Time    Sodium 139 12/05/2019 01:17 PM    Potassium 4.1 12/05/2019 01:17 PM    Chloride 107 12/05/2019 01:17 PM    CO2 30 12/05/2019 01:17 PM    Anion gap 2 (L) 12/05/2019 01:17 PM    Glucose 81 12/05/2019 01:17 PM    BUN 16 12/05/2019 01:17 PM    Creatinine 0.78 12/05/2019 01:17 PM    BUN/Creatinine ratio 21 (H) 12/05/2019 01:17 PM    GFR est AA >60 12/05/2019 01:17 PM    GFR est non-AA >60 12/05/2019 01:17 PM    Calcium 9.2 12/05/2019 01:17 PM         Assessment/Plan:    1. Pruritic rash  - PRN loratadine    2. Recurrent depression ([address]  - stable  - continue sertraline  - check TSH    3. Hypercholesteremia  - diet-controlled  - check lipid panel, CMP    4. Bilateral impacted cerumen  - Cerumen was removed using irrigation and lavage  - REMOVE IMPACTED EAR WAX      Follow-up and Dispositions    · Return in about 1 year (around 8/17/2022), or if symptoms worsen or fail to improve, for medicare wellness visit. I have discussed the diagnosis with the patient and the intended plan as seen in the above orders. The patient has received an after-visit summary and questions were answered concerning future plans. I have discussed medication side effects and warnings with the patient as well. I have reviewed the plan of care with the patient, accepted their input and they are in agreement with the treatment goals. Keri Garcia MD  August 17, 2021       This is the Subsequent Medicare Annual Wellness Exam, performed 12 months or more after the Initial AWV or the last Subsequent AWV    I have reviewed the patient's medical history in detail and updated the computerized patient record. Assessment/Plan   Education and counseling provided:  Are appropriate based on today's review and evaluation  Screening Mammography    1. Recurrent depression ([address]  2. Pruritic rash  3. Hypercholesteremia  4. Medicare annual wellness visit, subsequent  5.  Bilateral impacted cerumen  -     REMOVE IMPACTED EAR WAX       Depression Risk Factor Screening     3 most recent PHQ Screens 9/2/2020   Little interest or pleasure in doing things Not at all   Feeling down, depressed, irritable, or hopeless Not at all   Total Score PHQ 2 0       Alcohol Risk Screen    Do you average more than 1 drink per night or more than 7 drinks a week:  No    On any one occasion in the past three months have you have had more than 3 drinks containing alcohol:  No        Functional Ability and Level of Safety    Hearing: Hearing is good. Activities of Daily Living: The home contains: no safety equipment. Patient does total self care      Ambulation: with no difficulty     Fall Risk:  Fall Risk Assessment, last 12 mths 10/8/2019   Able to walk? Yes   Fall in past 12 months? No   Number of falls in past 12 months -   Fall with injury?  -      Abuse Screen:  Patient is not abused       Cognitive Screening    Has your family/caregiver stated any concerns about your memory: no     Cognitive Screening: Normal - Verbal Fluency Test    Health Maintenance Due     Health Maintenance Due   Topic Date Due    Hepatitis C Screening  Never done    COVID-19 Vaccine (1) Never done    DTaP/Tdap/Td series (1 - Tdap) Never done    Shingrix Vaccine Age 50> (1 of 2) Never done       Patient Care Team   Patient Care Team:  Jose Harper MD as PCP - General (Internal Medicine)  Jose Harper MD as PCP - [facility] Empaneled Provider  Ashley Morales NP as Nurse Practitioner (Urology)  Stanton Anderson MD (Female Pelvic Medicine and Reconstructive Surgery)  Wen New MD (Ophthalmology)    History     Patient Active Problem List   Diagnosis Code    Anxiety F41.9    Recurrent UTI N39.0    Uterine prolapse N81.4    Cystocele, midline N81.11    Recurrent depression ([facility] [address] F33.9    Hypercholesteremia E78.00    Senile osteopenia M85.80     Past Medical History:   Diagnosis Date    Allergic rhinitis     Anxiety     Arthropathy     Atrophic vaginitis     Back pain  Chest pain, atypical     Cystocele     Depression     Flank pain     HTN (hypertension)     Hypercholesteremia     Hyperlipidemia     Leg pain     Menopause     Metabolic disease     Mitral valve prolapse     with mitral regurgitation    Osteopenia     Recurrent UTI     right heel pain     Uterine prolapse     UTI (urinary tract infection)       Past Surgical History:   Procedure Laterality Date    HX BREAST BIOPSY Left ? 2010    Benign    HX CATARACT REMOVAL Right 2014     Current Outpatient Medications   Medication Sig Dispense Refill    sertraline (ZOLOFT) 100 mg tablet TAKE 1 TABLET BY MOUTH DAILY 90 Tablet 1    ALPRAZolam (XANAX) 0.25 mg tablet TAKE 1 TABLET BY MOUTH EVERY DAY AS NEEDED FOR ANXIETY 60 Tab 0    cyanocobalamin 1,000 mcg tablet Take  by mouth.  loratadine (CLARITIN) 10 mg tablet Take  by mouth.  Cholecalciferol, Vitamin D3, (VITAMIN D3) 2,000 unit cap capsule Take  by mouth.  cranberry fruit extract (CRANBERRY PO) Take  by Mouth.        Allergies   Allergen Reactions    Nitrofurantoin Hives and Rash    Atorvastatin Myalgia    Pyridium [Phenazopyridine] Itching    Sulfa (Sulfonamide Antibiotics) Rash       Family History   Problem Relation Age of Onset    Heart Disease Mother     Cancer Father     Heart Disease Brother     Heart Disease Sister      Social History     Tobacco Use    Smoking status: Never Smoker    Smokeless tobacco: Never Used   Substance Use Topics    Alcohol use: No     Alcohol/week: 0.0 standard drinks         Yousif Merida MD

## 2021-08-18 LAB
HCV AB S/CO SERPL IA: <0.1 S/CO RATIO (ref 0–0.9)
HCV AB SERPL QL IA: NORMAL

## 2021-08-31 ENCOUNTER — OFFICE VISIT (OUTPATIENT)
Dept: FAMILY MEDICINE CLINIC | Age: 76
End: 2021-08-31
Payer: MEDICARE

## 2021-08-31 VITALS
HEIGHT: 65 IN | TEMPERATURE: 97.2 F | BODY MASS INDEX: 23.66 KG/M2 | SYSTOLIC BLOOD PRESSURE: 129 MMHG | RESPIRATION RATE: 16 BRPM | WEIGHT: 142 LBS | DIASTOLIC BLOOD PRESSURE: 81 MMHG | OXYGEN SATURATION: 94 % | HEART RATE: 71 BPM

## 2021-08-31 DIAGNOSIS — E78.00 HYPERCHOLESTEREMIA: Primary | ICD-10-CM

## 2021-08-31 DIAGNOSIS — F41.9 ANXIETY: ICD-10-CM

## 2021-08-31 DIAGNOSIS — I83.93 ASYMPTOMATIC VARICOSE VEINS OF BOTH LOWER EXTREMITIES: ICD-10-CM

## 2021-08-31 DIAGNOSIS — F33.9 RECURRENT DEPRESSION (HCC): ICD-10-CM

## 2021-08-31 PROCEDURE — 1101F PT FALLS ASSESS-DOCD LE1/YR: CPT | Performed by: INTERNAL MEDICINE

## 2021-08-31 PROCEDURE — G8427 DOCREV CUR MEDS BY ELIG CLIN: HCPCS | Performed by: INTERNAL MEDICINE

## 2021-08-31 PROCEDURE — G8536 NO DOC ELDER MAL SCRN: HCPCS | Performed by: INTERNAL MEDICINE

## 2021-08-31 PROCEDURE — G8420 CALC BMI NORM PARAMETERS: HCPCS | Performed by: INTERNAL MEDICINE

## 2021-08-31 PROCEDURE — G9717 DOC PT DX DEP/BP F/U NT REQ: HCPCS | Performed by: INTERNAL MEDICINE

## 2021-08-31 PROCEDURE — G8399 PT W/DXA RESULTS DOCUMENT: HCPCS | Performed by: INTERNAL MEDICINE

## 2021-08-31 PROCEDURE — 99214 OFFICE O/P EST MOD 30 MIN: CPT | Performed by: INTERNAL MEDICINE

## 2021-08-31 PROCEDURE — 1090F PRES/ABSN URINE INCON ASSESS: CPT | Performed by: INTERNAL MEDICINE

## 2021-08-31 NOTE — PROGRESS NOTES
History of Present Illness  Amanda Sahni is a 64 y.o. female who presents today for management of    Chief Complaint   Patient presents with    Cholesterol Problem    Anxiety    Depression    Results       Depression/Anxiety   Patient is seen for followup of depression. Treatment includes Zoloft, Xanax and no other therapies. Ongoing symptoms include insomnia  She denies depressed mood, weight loss, weight gain, psychomotor retardation, fatigue, feelings of worthlessness/guilt, difficulty concentrating, impaired memory and recurrent thoughts of death. She experiences the following side effects from the treatment: none. Problem List  Patient Active Problem List    Diagnosis Date Noted    Senile osteopenia 12/11/2019    Hypercholesteremia 09/11/2018    Cystocele, midline 09/10/2018    Recurrent depression ([facility] [address] 09/10/2018    Uterine prolapse     Recurrent UTI     Anxiety        Current Medications  Current Outpatient Medications   Medication Sig    sertraline (ZOLOFT) 100 mg tablet TAKE 1 TABLET BY MOUTH DAILY    ALPRAZolam (XANAX) 0.25 mg tablet TAKE 1 TABLET BY MOUTH EVERY DAY AS NEEDED FOR ANXIETY    cyanocobalamin 1,000 mcg tablet Take  by mouth.  loratadine (CLARITIN) 10 mg tablet Take  by mouth.  Cholecalciferol, Vitamin D3, (VITAMIN D3) 2,000 unit cap capsule Take  by mouth.  cranberry fruit extract (CRANBERRY PO) Take  by Mouth. No current facility-administered medications for this visit. Allergies/Drug Reactions  Allergies   Allergen Reactions    Nitrofurantoin Hives and Rash    Atorvastatin Myalgia    Pyridium [Phenazopyridine] Itching    Sulfa (Sulfonamide Antibiotics) Rash        Review of Systems  Review of Systems   Constitutional: Negative. Respiratory: Negative. Cardiovascular: Negative. Gastrointestinal: Negative. Musculoskeletal: Negative. Neurological: Negative. Psychiatric/Behavioral: Negative.        Physical Exam  Vital signs:   Vitals: 08/31/21 1058   BP: 129/81   Pulse: 71   Resp: 16   Temp: 97.2 °F (36.2 °C)   TempSrc: Temporal   SpO2: 94%   Weight: 142 lb (64.4 kg)   Height: 5' 5\" (1.651 m)       General: alert, oriented, not in distress  HEENT:  Head: Normocephalic. Right Ear: External ear normal. Patent ear canal, TM intact. Left Ear: External ear normal. Patent ear canal, TM intact. Nose: Nose normal.   Mouth/Throat: Oropharynx is clear and moist. No oropharyngeal exudate. Eyes: Conjunctivae and EOM are normal. No scleral icterus. Neck: Normal range of motion. Neck supple. No thyromegaly present. Lymphadenopathy: no cervical adenopathy.    Chest/Lungs: clear breath sounds, no wheezing or crackles  Heart: normal rate, regular rhythm, no murmur  Abdomen: soft, non-distended, non-tender, normal bowel sounds, no organomegaly, no masses  Extremities: no focal deformities, varicose veins on both LE  Skin: no active skin lesions    Laboratory/Tests:  Lab Results   Component Value Date/Time    WBC 8.0 08/17/2021 10:42 AM    HGB 13.7 08/17/2021 10:42 AM    HCT 42.0 08/17/2021 10:42 AM    PLATELET 357 84/59/3390 10:42 AM    MCV 94.2 08/17/2021 10:42 AM     Lab Results   Component Value Date/Time    Cholesterol, total 224 (H) 08/17/2021 10:42 AM    HDL Cholesterol 68 (H) 08/17/2021 10:42 AM    LDL, calculated 122.6 (H) 08/17/2021 10:42 AM    Triglyceride 167 (H) 08/17/2021 10:42 AM    CHOL/HDL Ratio 3.3 08/17/2021 10:42 AM     Lab Results   Component Value Date/Time    TSH 1.13 08/17/2021 10:42 AM    Triiodothyronine (T3), free 2.5 04/25/2019 08:26 AM    T4, Free 0.8 04/25/2019 08:26 AM      Lab Results   Component Value Date/Time    Sodium 140 08/17/2021 10:42 AM    Potassium 4.4 08/17/2021 10:42 AM    Chloride 108 08/17/2021 10:42 AM    CO2 30 08/17/2021 10:42 AM    Anion gap 2 (L) 08/17/2021 10:42 AM    Glucose 83 08/17/2021 10:42 AM    BUN 16 08/17/2021 10:42 AM    Creatinine 0.71 08/17/2021 10:42 AM    BUN/Creatinine ratio 23 (H) 08/17/2021 10:42 AM    GFR est AA >60 08/17/2021 10:42 AM    GFR est non-AA >60 08/17/2021 10:42 AM    Calcium 8.6 08/17/2021 10:42 AM    Bilirubin, total 1.1 (H) 08/17/2021 10:42 AM    ALT (SGPT) 27 08/17/2021 10:42 AM    Alk. phosphatase 86 08/17/2021 10:42 AM    Protein, total 7.5 08/17/2021 10:42 AM    Albumin 3.7 08/17/2021 10:42 AM    Globulin 3.8 08/17/2021 10:42 AM    A-G Ratio 1.0 08/17/2021 10:42 AM         Assessment/Plan:    1. Hypercholesteremia  - diet-controlled    2. Recurrent depression ([address]  - stable  - continue sertraline    3. Anxiety  - stable  - continue sertraline and PRN alprazolam    4. Asymptomatic varicose veins of both lower extremities  - leg elevation, compression    Encouraged patient to get the COVID-19 vaccine. Follow-up and Dispositions    · Return in about 6 months (around 2/28/2022) for ROV, in-person. I have discussed the diagnosis with the patient and the intended plan as seen in the above orders. The patient has received an after-visit summary and questions were answered concerning future plans. I have discussed medication side effects and warnings with the patient as well. I have reviewed the plan of care with the patient, accepted their input and they are in agreement with the treatment goals.        Keri Garcia MD  August 31, 2021

## 2021-08-31 NOTE — PATIENT INSTRUCTIONS
Well Visit, Over 72: Care Instructions  Overview     Well visits can help you stay healthy. Your doctor has checked your overall health and may have suggested ways to take good care of yourself. Your doctor also may have recommended tests. At home, you can help prevent illness with healthy eating, regular exercise, and other steps. Follow-up care is a key part of your treatment and safety. Be sure to make and go to all appointments, and call your doctor if you are having problems. It's also a good idea to know your test results and keep a list of the medicines you take. How can you care for yourself at home? · Get screening tests that you and your doctor decide on. Screening helps find diseases before any symptoms appear. · Eat healthy foods. Choose fruits, vegetables, whole grains, protein, and low-fat dairy foods. Limit fat, especially saturated fat. Reduce salt in your diet. · Limit alcohol. If you are a man, have no more than 2 drinks a day or 14 drinks a week. If you are a woman, have no more than 1 drink a day or 7 drinks a week. Since alcohol affects older adults differently, you may want to limit alcohol even more. Or you may not want to drink at all. · Get at least 30 minutes of exercise on most days of the week. Walking is a good choice. You also may want to do other activities, such as running, swimming, cycling, or playing tennis or team sports. · Reach and stay at a healthy weight. This will lower your risk for many problems, such as obesity, diabetes, heart disease, and high blood pressure. · Do not smoke. Smoking can make health problems worse. If you need help quitting, talk to your doctor about stop-smoking programs and medicines. These can increase your chances of quitting for good. · Care for your mental health. It is easy to get weighed down by worry and stress. Learn strategies to manage stress, like deep breathing and mindfulness, and stay connected with your family and community. If you find you often feel sad or hopeless, talk with your doctor. Treatment can help. · Talk to your doctor about whether you have any risk factors for sexually transmitted infections (STIs). You can help prevent STIs if you wait to have sex with a new partner (or partners) until you've each been tested for STIs. It also helps if you use condoms (male or female condoms) and if you limit your sex partners to one person who only has sex with you. Vaccines are available for some STIs. · If you think you may have a problem with alcohol or drug use, talk to your doctor. This includes prescription medicines (such as amphetamines and opioids) and illegal drugs (such as cocaine and methamphetamine). Your doctor can help you figure out what type of treatment is best for you. · Protect your skin from too much sun. When you're outdoors from 10 a.m. to 4 p.m., stay in the shade or cover up with clothing and a hat with a wide brim. Wear sunglasses that block UV rays. Even when it's cloudy, put broad-spectrum sunscreen (SPF 30 or higher) on any exposed skin. · See a dentist one or two times a year for checkups and to have your teeth cleaned. · Wear a seat belt in the car. When should you call for help? Watch closely for changes in your health, and be sure to contact your doctor if you have any problems or symptoms that concern you. Where can you learn more? Go to http://www.gray.com/  Enter [id number] in the search box to learn more about \"Well Visit, Over 65: Care Instructions. \"  Current as of: May 27, 2020               Content Version: 12.8  © 5432-5216 [employer]. Care instructions adapted under license by [employer] (which disclaims liability or warranty for this information).  If you have questions about a medical condition or this instruction, always ask your healthcare professional. [address] any warranty or liability for your use of this information.

## 2021-08-31 NOTE — PROGRESS NOTES
Eliezer Price presents today for CPE  - Is someone accompanying this pt? no  - Is the patient using any durable medical equipment during office visit? no    Coordination of Care:  1. Have you been to the ER, urgent care clinic since your last visit? Hospitalized since your last visit? no    2. Have you seen or consulted any other health care providers outside of the [address] [place] [address] since your last visit? Include any pap smears or colon screening.  no

## 2021-10-14 NOTE — PROGRESS NOTES
History of Present Illness Rosendo Mcfralane is a 74 y.o. female who presents today for management of 
 
Chief Complaint Patient presents with  Establish Care  
  possiable UTI Patient is here to establish care. She used to see Dr. Livia France. Urinary Tract Infection Patient complains of frequency. Onset was 4 days ago, unchanged since that time. Patient denies back pain, congestion, headache, vaginal discharge and fever/chills. There is not any concern of sexual abuse. There is not a history of trauma to the genital area. Patient does have a history of recurrent UTI. Patient does not have a history of pyelonephritis. Patient has cystocele. States that the pessary came out last week. She follows with Dr. Danial Maria. Anxiety, Depression Review: 
Patient is seen for anxiety disorder, depression. Current treatment includes Zoloft, Xanax and no other therapies. Ongoing symptoms include: racing thoughts. Patient denies: depression, palpitations, sweating, chest pain, shortness of breath, difficulty concentrating, suicidal ideation. Reported side effects from the treatment: none. Past Medical History Past Medical History:  
Diagnosis Date  Allergic rhinitis  Anxiety  Arthropathy  Atrophic vaginitis  Back pain  Chest pain, atypical   
 Cystocele  Depression  Flank pain  HTN (hypertension)  Hypercholesteremia  Hyperlipidemia  Leg pain  Menopause  Metabolic disease  Mitral valve prolapse   
 with mitral regurgitation  Osteopenia  Recurrent UTI  right heel pain  Uterine prolapse  UTI (urinary tract infection) Surgical History Past Surgical History:  
Procedure Laterality Date  HX BREAST BIOPSY Left ? 2010 Benign  HX CATARACT REMOVAL Right 2014 Current Medications Current Outpatient Prescriptions Medication Sig  ciprofloxacin HCl (CIPRO) 250 mg tablet Take 1 Tab by mouth every twelve (12) hours for 5 days.  cyanocobalamin 1,000 mcg tablet Take  by mouth.  fluticasone (FLONASE) 50 mcg/actuation nasal spray by Nasal route.  loratadine (CLARITIN) 10 mg tablet Take  by mouth.  Cholecalciferol, Vitamin D3, (VITAMIN D3) 2,000 unit cap capsule Take  by mouth.  ALPRAZolam (XANAX) 0.25 mg tablet Take  by mouth.  cranberry fruit extract (CRANBERRY PO) Take  by Mouth.  sertraline (ZOLOFT) 100 mg tablet Take 50 mg by mouth daily. No current facility-administered medications for this visit. Allergies/Drug Reactions Allergies Allergen Reactions  Nitrofurantoin Hives and Rash Family History Family History Problem Relation Age of Onset  Heart Disease Mother  Cancer Father  Heart Disease Brother  Heart Disease Sister Social History Social History Social History  Marital status:  Spouse name: N/A  
 Number of children: N/A  
 Years of education: N/A Occupational History  Not on file. Social History Main Topics  Smoking status: Never Smoker  Smokeless tobacco: Never Used  Alcohol use No  
 Drug use: No  
 Sexual activity: No  
 
Other Topics Concern  Not on file Social History Narrative Health Maintenance Topic Date Due  
 DTaP/Tdap/Td series (1 - Tdap) 08/05/1964  ZOSTER VACCINE AGE 60>  06/05/2003  GLAUCOMA SCREENING Q2Y  08/05/2008  Pneumococcal 65+ Low/Medium Risk (1 of 2 - PCV13) 08/05/2008  MEDICARE YEARLY EXAM  03/14/2018  Influenza Age 5 to Adult  08/01/2018  Bone Densitometry (Dexa) Screening  Completed There is no immunization history on file for this patient. Review of Systems Negative except as mentioned in HPI Physical Exam 
Vital signs:  
Vitals:  
 09/10/18 1243 BP: 135/77 Pulse: 81 Resp: 20 Temp: 98 °F (36.7 °C) TempSrc: Oral  
SpO2: 95% Weight: 139 lb 12.8 oz (63.4 kg) Height: 5' 0.5\" (1.537 m) General: alert, oriented, not in distress Head: scalp normal, atraumatic Eyes: pupils are equal and reactive, full and intact EOM's 
Neck: supple, no JVD, no lymphadenopathy, non-palpable thyroid Chest/Lungs: clear breath sounds, no wheezing or crackles Heart: normal rate, regular rhythm, no murmur Abdomen: soft, non-distended, non-tender, normal bowel sounds, no organomegaly, no masses, no CVA tenderness Extremities: no focal deformities, no edema Skin: no active skin lesions Laboratory/Tests: URINE CULTURE 5/2018 FINAL REPORT (Abnormal) Microbiology results Comment:  
ANTIBIOTIC ALLERGIES Macrodantin/Macrobid (Nitrofurantoin) URINE SOURCE:  
Clean Catch COLONY COUNT / RESULT:  
Greater than 100,000 CFU/ml at 48 hours RESULT/ORG ID:  
Staph  coagulase-negative Sensitivity Analysis           Staph  coagulase-negative  
--------------------           -------------------------  
 
GENTAMICIN                                             S  
LEVAQUIN                                               S  
LINEZOLID                                              S  
MACRODANTN (NITROFURANTOIN                             S  
OXACILLIN                                              R  
PENICILLIN                                             R  
TETRACYCLINE                                           S  
VANCOMYCIN                                             S  
 
S=Sensitive; I=Intermediate; R=Resistant Component Latest Ref Rng & Units 1/1/2017 1/1/2017 1:15 PM  1:15 PM  
WBC 
    4.6 - 13.2 K/uL  5.5  
RBC 
    4.20 - 5.30 M/uL  4.80 HGB 12.0 - 16.0 g/dL  14.6 HCT 
    35.0 - 45.0 %  44.5 MCV 
    74.0 - 97.0 FL  92.7 MCH 
    24.0 - 34.0 PG  30.4 MCHC 31.0 - 37.0 g/dL  32.8  
RDW 
    11.6 - 14.5 %  12.8 PLATELET 
    424 - 612 K/uL  189 MPV 
    9.2 - 11.8 FL  10.9 NEUTROPHILS 
    40 - 73 %  67 LYMPHOCYTES 
    21 - 52 %  19 (L) MONOCYTES 
 3 - 10 %  9 EOSINOPHILS 
    0 - 5 %  4 BASOPHILS 
    0 - 2 %  1  
ABS. NEUTROPHILS 
    1.8 - 8.0 K/UL  3.7  
ABS. LYMPHOCYTES 
    0.9 - 3.6 K/UL  1.0  
ABS. MONOCYTES 
    0.05 - 1.2 K/UL  0.5  
ABS. EOSINOPHILS 
    0.0 - 0.4 K/UL  0.2  
ABS. BASOPHILS 
    0.0 - 0.06 K/UL  0.0  
DF AUTOMATED Sodium 136 - 145 mmol/L 141 Potassium 3.5 - 5.5 mmol/L 4.6 Chloride 100 - 108 mmol/L 103 CO2 
    21 - 32 mmol/L 29 Anion gap 3.0 - 18 mmol/L 9 Glucose 74 - 99 mg/dL 88 BUN 
    7.0 - 18 MG/DL 18 Creatinine 
    0.6 - 1.3 MG/DL 0.74 BUN/Creatinine ratio 12 - 20   24 (H) GFR est AA 
    >60 ml/min/1.73m2 >60   
GFR est non-AA 
    >60 ml/min/1.73m2 >60 Calcium 8.5 - 10.1 MG/DL 9.6 Assessment/Plan: 1. Diet-controlled hypercholesterolemia 
- CBC WITH AUTOMATED DIFF; Future - LIPID PANEL; Future - METABOLIC PANEL, COMPREHENSIVE; Future 
- TSH 3RD GENERATION; Future 2. Anxiety 
- stable 
- continue Xanax as needed 3. Recurrent UTI 
- CULTURE, URINE; Future 4. Cystocele, midline 
- urogynecology follow-up 5. UTI symptoms - AMB POC URINALYSIS DIP STICK AUTO W/O MICRO 
- CULTURE, URINE; Future 6. UTI (urinary tract infection), uncomplicated 
- ciprofloxacin HCl (CIPRO) 250 mg tablet; Take 1 Tab by mouth every twelve (12) hours for 5 days. Dispense: 10 Tab; Refill: 0 
 
7. Recurrent depression ([address] 
- stable 
- continue Zoloft Follow-up Disposition: 
Return in about 3 months (around 12/10/2018) for rov. I have discussed the diagnosis with the patient and the intended plan as seen in the above orders. The patient has received an after-visit summary and questions were answered concerning future plans. I have discussed medication side effects and warnings with the patient as well. I have reviewed the plan of care with the patient, accepted their input and they are in agreement with the treatment goals. Diaz Ramsey MD 
September 10, 2018 football hold

## 2021-11-30 ENCOUNTER — OFFICE VISIT (OUTPATIENT)
Dept: FAMILY MEDICINE CLINIC | Age: 76
End: 2021-11-30
Payer: MEDICARE

## 2021-11-30 ENCOUNTER — HOSPITAL ENCOUNTER (OUTPATIENT)
Dept: LAB | Age: 76
Discharge: HOME | End: 2021-11-30
Payer: MEDICARE

## 2021-11-30 VITALS
WEIGHT: 146.2 LBS | RESPIRATION RATE: 16 BRPM | TEMPERATURE: 97.4 F | DIASTOLIC BLOOD PRESSURE: 72 MMHG | OXYGEN SATURATION: 97 % | BODY MASS INDEX: 24.36 KG/M2 | HEIGHT: 65 IN | SYSTOLIC BLOOD PRESSURE: 129 MMHG | HEART RATE: 75 BPM

## 2021-11-30 DIAGNOSIS — N39.0 RECURRENT UTI: ICD-10-CM

## 2021-11-30 DIAGNOSIS — R35.0 URINARY FREQUENCY: Primary | ICD-10-CM

## 2021-11-30 LAB
BILIRUB UR QL STRIP: NEGATIVE
GLUCOSE UR-MCNC: NEGATIVE MG/DL
KETONES P FAST UR STRIP-MCNC: NEGATIVE MG/DL
PH UR STRIP: 6 [PH] (ref 4.6–8)
PROT UR QL STRIP: NORMAL
SP GR UR STRIP: 1.02 (ref 1–1.03)
UA UROBILINOGEN AMB POC: NORMAL (ref 0.2–1)
URINALYSIS CLARITY POC: NORMAL
URINALYSIS COLOR POC: YELLOW
URINE BLOOD POC: NORMAL
URINE LEUKOCYTES POC: NORMAL
URINE NITRITES POC: NEGATIVE

## 2021-11-30 PROCEDURE — 1101F PT FALLS ASSESS-DOCD LE1/YR: CPT | Performed by: INTERNAL MEDICINE

## 2021-11-30 PROCEDURE — 87186 SC STD MICRODIL/AGAR DIL: CPT

## 2021-11-30 PROCEDURE — 87077 CULTURE AEROBIC IDENTIFY: CPT

## 2021-11-30 PROCEDURE — 87086 URINE CULTURE/COLONY COUNT: CPT

## 2021-11-30 PROCEDURE — 81003 URINALYSIS AUTO W/O SCOPE: CPT | Performed by: INTERNAL MEDICINE

## 2021-11-30 PROCEDURE — G9717 DOC PT DX DEP/BP F/U NT REQ: HCPCS | Performed by: INTERNAL MEDICINE

## 2021-11-30 PROCEDURE — G8420 CALC BMI NORM PARAMETERS: HCPCS | Performed by: INTERNAL MEDICINE

## 2021-11-30 PROCEDURE — 99213 OFFICE O/P EST LOW 20 MIN: CPT | Performed by: INTERNAL MEDICINE

## 2021-11-30 PROCEDURE — G8427 DOCREV CUR MEDS BY ELIG CLIN: HCPCS | Performed by: INTERNAL MEDICINE

## 2021-11-30 PROCEDURE — G8536 NO DOC ELDER MAL SCRN: HCPCS | Performed by: INTERNAL MEDICINE

## 2021-11-30 PROCEDURE — G8399 PT W/DXA RESULTS DOCUMENT: HCPCS | Performed by: INTERNAL MEDICINE

## 2021-11-30 PROCEDURE — 1090F PRES/ABSN URINE INCON ASSESS: CPT | Performed by: INTERNAL MEDICINE

## 2021-11-30 RX ORDER — CIPROFLOXACIN 250 MG/1
250 TABLET, FILM COATED ORAL EVERY 12 HOURS
Qty: 10 TABLET | Refills: 0 | Status: SHIPPED | OUTPATIENT
Start: 2021-11-30 | End: 2021-12-05

## 2021-11-30 NOTE — PROGRESS NOTES
History of Present Illness  Asif Coronel is a 64 y.o. female who presents today for management of    Chief Complaint   Patient presents with    Urinary Frequency     since last week change color and odor. Dysuria  Patient presents with burning with urination, urinary frequency  beginning 2 days ago. Fever has been absent. Symptoms which are not present include: abdominal pain, back pain, blood in urine, vaginal discharge, vaginal itching, vomiting. UTI history: recent UTI with Klebsiella 1 month ago, treated with ciprofloxacin. Problem List  Patient Active Problem List    Diagnosis Date Noted    Senile osteopenia 12/11/2019    Hypercholesteremia 09/11/2018    Cystocele, midline 09/10/2018    Recurrent depression ([facility] [address] 09/10/2018    Uterine prolapse     Recurrent UTI     Anxiety        Current Medications  Current Outpatient Medications   Medication Sig    ciprofloxacin HCl (CIPRO) 250 mg tablet Take 1 Tablet by mouth every twelve (12) hours for 5 days.  sertraline (ZOLOFT) 100 mg tablet TAKE 1 TABLET BY MOUTH DAILY    ALPRAZolam (XANAX) 0.25 mg tablet TAKE 1 TABLET BY MOUTH EVERY DAY AS NEEDED FOR ANXIETY    cyanocobalamin 1,000 mcg tablet Take  by mouth.  loratadine (CLARITIN) 10 mg tablet Take  by mouth.  Cholecalciferol, Vitamin D3, (VITAMIN D3) 2,000 unit cap capsule Take  by mouth.  cranberry fruit extract (CRANBERRY PO) Take  by Mouth.  estradioL (ESTRACE) 0.01 % (0.1 mg/gram) vaginal cream Apply 1g vaginally three times a week, apply with fingertip - do not use applicator. (Patient not taking: Reported on 11/30/2021)     No current facility-administered medications for this visit. Allergies/Drug Reactions  Allergies   Allergen Reactions    Nitrofurantoin Hives and Rash    Atorvastatin Myalgia    Pyridium [Phenazopyridine] Itching    Sulfa (Sulfonamide Antibiotics) Rash        Review of Systems  Review of Systems   Constitutional: Negative.     Respiratory: Negative. Cardiovascular: Negative. Gastrointestinal: Negative for abdominal pain, constipation, diarrhea, heartburn, nausea and vomiting. Genitourinary: Positive for dysuria, frequency and urgency. Negative for flank pain and hematuria. Neurological: Negative. Psychiatric/Behavioral: Negative. Physical Exam  Vital signs:   Vitals:    11/30/21 1105   BP: 129/72   Pulse: 75   Resp: 16   Temp: 97.4 °F (36.3 °C)   TempSrc: Temporal   SpO2: 97%   Weight: 146 lb 3.2 oz (66.3 kg)   Height: 5' 5\" (1.651 m)       General: alert, oriented, not in distress  Head: scalp normal, atraumatic  Eyes: pupils are equal and reactive, full and intact EOM's  Neck: supple, no JVD, no lymphadenopathy, non-palpable thyroid  Abdomen: soft, non-distended, non-tender, normal bowel sounds, no organomegaly, no masses, no CVAT  Extremities: no focal deformities, no edema  Skin: no active skin lesions    Laboratory/Tests:  POC Urinalysis  Blood +1  Protein +1  Leucocytes +3  Nitrite negative    Assessment/Plan:      ICD-10-CM ICD-9-CM    1. Urinary frequency  R35.0 788.41 AMB POC URINALYSIS DIP STICK AUTO W/O MICRO   2. Recurrent UTI  N39.0 599.0 ciprofloxacin HCl (CIPRO) 250 mg tablet      CULTURE, URINE     Symptomatic UTI  Check urine culture - has allergies to sulfa and nitrofurantoin  Will treat with cipro x 5 days    Follow-up and Dispositions    · Return if symptoms worsen or fail to improve, for ROV, establish care with a new PCP. I have discussed the diagnosis with the patient and the intended plan as seen in the above orders. The patient has received an after-visit summary and questions were answered concerning future plans. I have discussed medication side effects and warnings with the patient as well. I have reviewed the plan of care with the patient, accepted their input and they are in agreement with the treatment goals.        Basilio Hobbs MD  November 30, 2021

## 2021-11-30 NOTE — PROGRESS NOTES
Chief Complaint   Patient presents with    Urinary Frequency     since last week change color and odor. 1. Have you been to the ER, urgent care clinic since your last visit? Hospitalized since your last visit? No    2. Have you seen or consulted any other health care providers outside of the [address] [place] [address] since your last visit? Include any pap smears or colon screening.  Yes urologist in september     Health Maintenance Due   Topic Date Due    COVID-19 Vaccine (1) Never done    DTaP/Tdap/Td series (1 - Tdap) Never done    Shingrix Vaccine Age 50> (1 of 2) Never done    Pneumococcal 65+ years (1 of 1 - PPSV23) Never done    Flu Vaccine (1) Never done     Patient decline flu vaccine   Not taken covid 19 vaccine

## 2021-12-03 DIAGNOSIS — N39.0 ACUTE UTI: Primary | ICD-10-CM

## 2021-12-03 LAB
BACTERIA SPEC CULT: ABNORMAL
CC UR VC: ABNORMAL
SERVICE CMNT-IMP: ABNORMAL

## 2021-12-19 DIAGNOSIS — F33.9 RECURRENT DEPRESSION (HCC): ICD-10-CM

## 2021-12-20 RX ORDER — SERTRALINE HYDROCHLORIDE 100 MG/1
TABLET, FILM COATED ORAL
Qty: 90 TABLET | Refills: 1 | Status: SHIPPED | OUTPATIENT
Start: 2021-12-20 | End: 2022-06-06 | Stop reason: SDUPTHER

## 2022-02-22 ENCOUNTER — OFFICE VISIT (OUTPATIENT)
Dept: FAMILY MEDICINE CLINIC | Age: 77
End: 2022-02-22
Payer: MEDICARE

## 2022-02-22 VITALS
SYSTOLIC BLOOD PRESSURE: 125 MMHG | RESPIRATION RATE: 16 BRPM | HEIGHT: 65 IN | HEART RATE: 78 BPM | TEMPERATURE: 98.1 F | WEIGHT: 138.4 LBS | OXYGEN SATURATION: 99 % | BODY MASS INDEX: 23.06 KG/M2 | DIASTOLIC BLOOD PRESSURE: 83 MMHG

## 2022-02-22 DIAGNOSIS — R05.8 POST-VIRAL COUGH SYNDROME: ICD-10-CM

## 2022-02-22 DIAGNOSIS — F33.9 RECURRENT DEPRESSION (HCC): ICD-10-CM

## 2022-02-22 DIAGNOSIS — N89.8 VAGINAL ITCHING: Primary | ICD-10-CM

## 2022-02-22 DIAGNOSIS — E78.00 HYPERCHOLESTEREMIA: ICD-10-CM

## 2022-02-22 DIAGNOSIS — Z00.00 BLOOD TESTS FOR ROUTINE GENERAL PHYSICAL EXAMINATION: ICD-10-CM

## 2022-02-22 PROCEDURE — 1090F PRES/ABSN URINE INCON ASSESS: CPT | Performed by: STUDENT IN AN ORGANIZED HEALTH CARE EDUCATION/TRAINING PROGRAM

## 2022-02-22 PROCEDURE — 99214 OFFICE O/P EST MOD 30 MIN: CPT | Performed by: STUDENT IN AN ORGANIZED HEALTH CARE EDUCATION/TRAINING PROGRAM

## 2022-02-22 PROCEDURE — G8428 CUR MEDS NOT DOCUMENT: HCPCS | Performed by: STUDENT IN AN ORGANIZED HEALTH CARE EDUCATION/TRAINING PROGRAM

## 2022-02-22 PROCEDURE — G8536 NO DOC ELDER MAL SCRN: HCPCS | Performed by: STUDENT IN AN ORGANIZED HEALTH CARE EDUCATION/TRAINING PROGRAM

## 2022-02-22 PROCEDURE — G8420 CALC BMI NORM PARAMETERS: HCPCS | Performed by: STUDENT IN AN ORGANIZED HEALTH CARE EDUCATION/TRAINING PROGRAM

## 2022-02-22 PROCEDURE — G9717 DOC PT DX DEP/BP F/U NT REQ: HCPCS | Performed by: STUDENT IN AN ORGANIZED HEALTH CARE EDUCATION/TRAINING PROGRAM

## 2022-02-22 PROCEDURE — G8399 PT W/DXA RESULTS DOCUMENT: HCPCS | Performed by: STUDENT IN AN ORGANIZED HEALTH CARE EDUCATION/TRAINING PROGRAM

## 2022-02-22 PROCEDURE — 1101F PT FALLS ASSESS-DOCD LE1/YR: CPT | Performed by: STUDENT IN AN ORGANIZED HEALTH CARE EDUCATION/TRAINING PROGRAM

## 2022-02-22 RX ORDER — FLUCONAZOLE 150 MG/1
150 TABLET ORAL DAILY
Qty: 1 TABLET | Refills: 0 | Status: SHIPPED | OUTPATIENT
Start: 2022-02-22 | End: 2022-02-23

## 2022-02-22 RX ORDER — CEPHALEXIN 500 MG/1
500 CAPSULE ORAL 3 TIMES DAILY
COMMUNITY
Start: 2022-02-17 | End: 2022-05-10

## 2022-02-22 NOTE — PROGRESS NOTES
Donis Pop is a 64 y.o.  female and presents with    Chief Complaint   Patient presents with   Alexandra Spurling Establish Care    Cough     covid 19 (1/28/22) dry cough w/o expetoration, diaphoresis     Urinary Frequency     urgent care dx UTI taking cephalexin 4/7     Vaginal Itching     w/ pelvic pain for 2 weeks            Subjective: Well Adult Physical   Patient here for a comprehensive physical exam.    Do you take any herbs or supplements that were not prescribed by a doctor? Yes, vitamin B12 and cranberry pills, and sometimes probiotics. Are you taking calcium supplements? yes Are you taking aspirin daily? no    Had COVID in 1/28/2022. After COVID she has dry coughing spells. This are not continuous. Is trying to take some lozenges for it. Seem to be improving with time. Went about 6 days ago to urgent care d/t a UTI. Was given Cephalexin. She has a pessary. Since taking the antibiotics her sx for UTI have improved but now she is having an itching in the vaginal area. States she does f/u w/ urology d/t chronic UTIs. Has had history of yeast infections after antibiotics in the past.    Having night sweats, states that recently this have been almost nightly. She has had to get up from bed and change her clothes due to the amount of sweat.     Patient Active Problem List   Diagnosis Code    Anxiety F41.9    Recurrent UTI N39.0    Uterine prolapse N81.4    Cystocele, midline N81.11    Recurrent depression ([facility] [address] F33.9    Hypercholesteremia E78.00    Senile osteopenia M85.80      Past Medical History:   Diagnosis Date    Allergic rhinitis     Anxiety     Arthropathy     Atrophic vaginitis     Back pain     Chest pain, atypical     COVID-19 01/2022    Cystocele     Depression     Flank pain     HTN (hypertension)     Hypercholesteremia     Hyperlipidemia     Leg pain     Menopause     Metabolic disease     Mitral valve prolapse     with mitral regurgitation    Osteopenia     Recurrent UTI     right heel pain     Uterine prolapse     UTI (urinary tract infection)       Past Surgical History:   Procedure Laterality Date    HX BREAST BIOPSY Left ? 2010    Benign    HX CATARACT REMOVAL Right 2014      Family History   Problem Relation Age of Onset    Heart Disease Mother     Cancer Father     Heart Disease Brother     Heart Disease Sister      Social History     Socioeconomic History    Marital status:      Spouse name: Not on file    Number of children: Not on file    Years of education: Not on file    Highest education level: Not on file   Occupational History    Not on file   Tobacco Use    Smoking status: Never Smoker    Smokeless tobacco: Never Used   Vaping Use    Vaping Use: Never used   Substance and Sexual Activity    Alcohol use: No     Alcohol/week: 0.0 standard drinks    Drug use: No    Sexual activity: Never   Other Topics Concern    Not on file   Social History Narrative    Not on file     Social Determinants of Health     Financial Resource Strain:     Difficulty of Paying Living Expenses: Not on file   Food Insecurity:     Worried About Running Out of Food in the Last Year: Not on file    Tanvir of Food in the Last Year: Not on file   Transportation Needs:     Lack of Transportation (Medical): Not on file    Lack of Transportation (Non-Medical):  Not on file   Physical Activity:     Days of Exercise per Week: Not on file    Minutes of Exercise per Session: Not on file   Stress:     Feeling of Stress : Not on file   Social Connections:     Frequency of Communication with Friends and Family: Not on file    Frequency of Social Gatherings with Friends and Family: Not on file    Attends Yazidi Services: Not on file    Active Member of Clubs or Organizations: Not on file    Attends Club or Organization Meetings: Not on file    Marital Status: Not on file   Intimate Partner Violence:     Fear of Current or Ex-Partner: Not on file   Vincent Emotionally Abused: Not on file    Physically Abused: Not on file    Sexually Abused: Not on file   Housing Stability:     Unable to Pay for Housing in the Last Year: Not on file    Number of Places Lived in the Last Year: Not on file    Unstable Housing in the Last Year: Not on file        Current Outpatient Medications   Medication Sig Dispense Refill    cephALEXin (KEFLEX) 500 mg capsule Take 500 mg by mouth three (3) times daily. 4/7      sertraline (ZOLOFT) 100 mg tablet TAKE 1 TABLET BY MOUTH DAILY 90 Tablet 1    ALPRAZolam (XANAX) 0.25 mg tablet TAKE 1 TABLET BY MOUTH EVERY DAY AS NEEDED FOR ANXIETY 60 Tab 0    cyanocobalamin 1,000 mcg tablet Take  by mouth.  loratadine (CLARITIN) 10 mg tablet Take  by mouth.  Cholecalciferol, Vitamin D3, (VITAMIN D3) 2,000 unit cap capsule Take  by mouth.  cranberry fruit extract (CRANBERRY PO) Take  by Mouth.  estradioL (ESTRACE) 0.01 % (0.1 mg/gram) vaginal cream Apply 1g vaginally three times a week, apply with fingertip - do not use applicator. (Patient not taking: Reported on 11/30/2021) 42.5 g 3        ROS   Review of Systems   Constitutional: Negative for chills, fever and malaise/fatigue. HENT: Negative for congestion, ear discharge and ear pain. Eyes: Negative for blurred vision, pain and discharge. Respiratory: Negative for cough and shortness of breath. Cardiovascular: Negative for chest pain and palpitations. Gastrointestinal: Negative for abdominal pain, nausea and vomiting. Genitourinary: Negative for dysuria, frequency and urgency. Skin: Positive for itching. Negative for rash. Neurological: Negative for dizziness, seizures, loss of consciousness and headaches. Psychiatric/Behavioral: Negative for substance abuse.            Objective:  Vitals:    02/22/22 1025   BP: 125/83   Pulse: 78   Resp: 16   Temp: 98.1 °F (36.7 °C)   TempSrc: Temporal   SpO2: 99%   Weight: 138 lb 6.4 oz (62.8 kg)   Height: 5' 5\" (1.651 m) PainSc:   0 - No pain       Physical Exam  Vitals reviewed. Constitutional:       Appearance: Normal appearance. Eyes:      General: No scleral icterus. Right eye: No discharge. Left eye: No discharge. Cardiovascular:      Rate and Rhythm: Normal rate and regular rhythm. Pulses: Normal pulses. Pulmonary:      Effort: Pulmonary effort is normal. No respiratory distress. Breath sounds: Normal breath sounds. Abdominal:      General: There is no distension. Palpations: Abdomen is soft. Tenderness: There is no abdominal tenderness. Musculoskeletal:      Cervical back: Normal range of motion and neck supple. Skin:     General: Skin is warm. Neurological:      Mental Status: She is alert and oriented to person, place, and time. Cranial Nerves: No cranial nerve deficit. Psychiatric:         Mood and Affect: Mood normal.         Behavior: Behavior normal.         Thought Content: Thought content normal.         Judgment: Judgment normal.           LABS     TESTS      Assessment/Plan:    1. Recurrent depression ([address]  Stable, continue taking sertraline 100 mg daily. 2. Vaginal itching  Likely secondary to yeast infection. Advised patient to finish antibiotics for UTI, and then take Diflucan tablet. - fluconazole (DIFLUCAN) 150 mg tablet; Take 1 Tablet by mouth daily for 1 day. FDA advises cautious prescribing of oral fluconazole in pregnancy. Dispense: 1 Tablet; Refill: 0    3. Hypercholesteremia  - LIPID PANEL; Future    4. Blood tests for routine general physical examination  - METABOLIC PANEL, COMPREHENSIVE; Future  - CBC WITH AUTOMATED DIFF; Future  - URINALYSIS W/MICROSCOPIC; Future    5. Post-viral cough syndrome  Discussed with patient that this will take a couple of weeks before it improves. Can continue taking the lozenges for cough in the meantime.     Lab review: orders written for new lab studies as appropriate; see orders      I have discussed the diagnosis with the patient and the intended plan as seen in the above orders. The patient has received an after-visit summary and questions were answered concerning future plans. I have discussed medication side effects and warnings with the patient as well. I have reviewed the plan of care with the patient, accepted their input and they are in agreement with the treatment goals.          Sae Glez MD

## 2022-02-22 NOTE — PROGRESS NOTES
Chief Complaint   Patient presents with    Establish Care    Cough     covid 19 (1/28/22) dry cough w/o expetoration, diaphoresis     Urinary Frequency     urgent care dx UTI taking cephalexin 4/7     Vaginal Itching     w/ pelvic pain for 2 weeks      1. Have you been to the ER, urgent care clinic since your last visit? Hospitalized since your last visit? Yes [facility] for weakness     2. Have you seen or consulted any other health care providers outside of the [address] [place] [address] since your last visit? Include any pap smears or colon screening.  No    Health Maintenance Due   Topic Date Due    COVID-19 Vaccine (1) Never done    DTaP/Tdap/Td series (1 - Tdap) Never done    Shingrix Vaccine Age 50> (1 of 2) Never done    Pneumococcal 65+ years (1 of 1 - PPSV23) Never done    Flu Vaccine (1) Never done    Depression Monitoring  09/02/2021     Not taken flu nor covid vaccine

## 2022-03-18 PROBLEM — M85.80 SENILE OSTEOPENIA: Status: ACTIVE | Noted: 2019-12-11

## 2022-03-19 PROBLEM — N81.11 CYSTOCELE, MIDLINE: Status: ACTIVE | Noted: 2018-09-10

## 2022-03-19 PROBLEM — E78.00 HYPERCHOLESTEREMIA: Status: ACTIVE | Noted: 2018-09-11

## 2022-03-20 PROBLEM — F33.9 RECURRENT DEPRESSION (HCC): Status: ACTIVE | Noted: 2018-09-10

## 2022-06-06 ENCOUNTER — HOSPITAL ENCOUNTER (OUTPATIENT)
Dept: LAB | Age: 77
Discharge: HOME | End: 2022-06-06
Payer: MEDICARE

## 2022-06-06 ENCOUNTER — OFFICE VISIT (OUTPATIENT)
Dept: FAMILY MEDICINE CLINIC | Age: 77
End: 2022-06-06
Payer: MEDICARE

## 2022-06-06 VITALS
RESPIRATION RATE: 16 BRPM | TEMPERATURE: 97.2 F | BODY MASS INDEX: 22.86 KG/M2 | SYSTOLIC BLOOD PRESSURE: 138 MMHG | WEIGHT: 137.2 LBS | DIASTOLIC BLOOD PRESSURE: 71 MMHG | HEART RATE: 71 BPM | HEIGHT: 65 IN | OXYGEN SATURATION: 97 %

## 2022-06-06 DIAGNOSIS — B37.31 VAGINAL CANDIDA: ICD-10-CM

## 2022-06-06 DIAGNOSIS — R35.0 URINARY FREQUENCY: Primary | ICD-10-CM

## 2022-06-06 DIAGNOSIS — F33.9 RECURRENT DEPRESSION (HCC): ICD-10-CM

## 2022-06-06 DIAGNOSIS — M70.61 GREATER TROCHANTERIC BURSITIS OF RIGHT HIP: ICD-10-CM

## 2022-06-06 DIAGNOSIS — N30.01 ACUTE CYSTITIS WITH HEMATURIA: ICD-10-CM

## 2022-06-06 LAB
BILIRUB UR QL STRIP: NEGATIVE
GLUCOSE UR-MCNC: NEGATIVE MG/DL
KETONES P FAST UR STRIP-MCNC: NEGATIVE MG/DL
PH UR STRIP: 5.5 [PH] (ref 4.6–8)
PROT UR QL STRIP: NORMAL
SP GR UR STRIP: 1.03 (ref 1–1.03)
UA UROBILINOGEN AMB POC: NORMAL (ref 0.2–1)
URINALYSIS CLARITY POC: CLEAR
URINALYSIS COLOR POC: YELLOW
URINE BLOOD POC: NORMAL
URINE LEUKOCYTES POC: NORMAL
URINE NITRITES POC: NEGATIVE

## 2022-06-06 PROCEDURE — G8427 DOCREV CUR MEDS BY ELIG CLIN: HCPCS | Performed by: FAMILY MEDICINE

## 2022-06-06 PROCEDURE — 87077 CULTURE AEROBIC IDENTIFY: CPT

## 2022-06-06 PROCEDURE — G8536 NO DOC ELDER MAL SCRN: HCPCS | Performed by: FAMILY MEDICINE

## 2022-06-06 PROCEDURE — 99214 OFFICE O/P EST MOD 30 MIN: CPT | Performed by: FAMILY MEDICINE

## 2022-06-06 PROCEDURE — 81001 URINALYSIS AUTO W/SCOPE: CPT | Performed by: FAMILY MEDICINE

## 2022-06-06 PROCEDURE — 87086 URINE CULTURE/COLONY COUNT: CPT

## 2022-06-06 PROCEDURE — 1101F PT FALLS ASSESS-DOCD LE1/YR: CPT | Performed by: FAMILY MEDICINE

## 2022-06-06 PROCEDURE — G8420 CALC BMI NORM PARAMETERS: HCPCS | Performed by: FAMILY MEDICINE

## 2022-06-06 PROCEDURE — 87186 SC STD MICRODIL/AGAR DIL: CPT

## 2022-06-06 PROCEDURE — 1090F PRES/ABSN URINE INCON ASSESS: CPT | Performed by: FAMILY MEDICINE

## 2022-06-06 PROCEDURE — G9717 DOC PT DX DEP/BP F/U NT REQ: HCPCS | Performed by: FAMILY MEDICINE

## 2022-06-06 PROCEDURE — 1123F ACP DISCUSS/DSCN MKR DOCD: CPT | Performed by: FAMILY MEDICINE

## 2022-06-06 PROCEDURE — G8399 PT W/DXA RESULTS DOCUMENT: HCPCS | Performed by: FAMILY MEDICINE

## 2022-06-06 RX ORDER — CEPHALEXIN 500 MG/1
500 CAPSULE ORAL 4 TIMES DAILY
Qty: 40 CAPSULE | Refills: 0 | Status: SHIPPED | OUTPATIENT
Start: 2022-06-06 | End: 2022-06-16

## 2022-06-06 RX ORDER — FLUCONAZOLE 150 MG/1
150 TABLET ORAL DAILY
Qty: 1 TABLET | Refills: 0 | Status: SHIPPED | OUTPATIENT
Start: 2022-06-06 | End: 2022-06-07

## 2022-06-06 RX ORDER — SERTRALINE HYDROCHLORIDE 100 MG/1
100 TABLET, FILM COATED ORAL DAILY
Qty: 90 TABLET | Refills: 1 | Status: SHIPPED | OUTPATIENT
Start: 2022-06-06

## 2022-06-06 RX ORDER — DICLOFENAC SODIUM 10 MG/G
2 GEL TOPICAL 4 TIMES DAILY
Qty: 100 G | Refills: 0 | Status: SHIPPED | OUTPATIENT
Start: 2022-06-06

## 2022-06-06 NOTE — PROGRESS NOTES
Leonid Favorite is a 64 y.o. female (: 1945) presenting to address:    Chief Complaint   Patient presents with    Urinary Frequency     No covid shots  There were no vitals filed for this visit. Hearing/Vision:   No exam data present    Learning Assessment:     Learning Assessment 2022   PRIMARY LEARNER Patient   HIGHEST LEVEL OF EDUCATION - PRIMARY LEARNER  GRADUATED HIGH SCHOOL OR GED   BARRIERS PRIMARY LEARNER NONE   CO-LEARNER CAREGIVER -   PRIMARY LANGUAGE ENGLISH   LEARNER PREFERENCE PRIMARY READING   ANSWERED BY patient    RELATIONSHIP SELF     Depression Screening:     3 most recent PHQ Screens 2022   Little interest or pleasure in doing things Not at all   Feeling down, depressed, irritable, or hopeless Not at all   Total Score PHQ 2 0     Fall Risk Assessment:     Fall Risk Assessment, last 12 mths 2022   Able to walk? Yes   Fall in past 12 months? 0   Do you feel unsteady? 0   Are you worried about falling 0   Is TUG test greater than 12 seconds? -   Number of falls in past 12 months -   Fall with injury? -     Abuse Screening:     Abuse Screening Questionnaire 2022   Do you ever feel afraid of your partner? N   Are you in a relationship with someone who physically or mentally threatens you? N   Is it safe for you to go home? Y     ADL Assessment:   No flowsheet data found. Coordination of Care Questionaire:     1. \"Have you been to the ER, urgent care clinic since your last visit? Hospitalized since your last visit? \" No    2. \"Have you seen or consulted any other health care providers outside of the [address] [place] [address] since your last visit? \" Yes Where: urology     3. For patients aged 39-70: Has the patient had a colonoscopy / FIT/ Cologuard? NA - based on age      If the patient is female:    4. For patients aged 41-77: Has the patient had a mammogram within the past 2 years? Yes - no Care Gap present      5.  For patients aged 21-65: Has the patient had a pap smear? NA - based on age or sex

## 2022-06-06 NOTE — PROGRESS NOTES
Carlos Layne is a 64 y.o.  female and presents with    Chief Complaint   Patient presents with    Urinary Frequency       Subjective:  Urinary Tract Infection  Patient complains of dysuria, frequency, burning with urination. Onset was a few days ago, gradually worsening since that time. Patient complains of right hip pain after a fall last week. Patient denies back pain, congestion, cough, fever, headache, rhinitis, sorethroat and stomach ache. There is not any concern of sexual abuse. There is not a history of trauma to the genital area. Patient does have a history of recurrent UTI. Patient does not have a history of pyelonephritis. Depression Review:  Patient is seen for followup of depression. Treatment includes Zoloft, Xanax and no other therapies. Ongoing symptoms include depressed mood, insomnia, fatigue, feelings of worthlessness/guilt, difficulty concentrating and hopelessness. She denies recurrent thoughts of death. She experiences the following side effects from the treatment: none. ROS   General ROS: negative for - chills, fatigue or fever  Psychological ROS: negative for - behavioral disorder  Ophthalmic ROS: negative for - blurry vision  ENT ROS: negative for - headaches, nasal congestion or sore throat  Breast ROS: negative for breast lumps  Respiratory ROS: no cough, shortness of breath, or wheezing  Cardiovascular ROS: no chest pain or dyspnea on exertion  Gastrointestinal ROS: no abdominal pain, change in bowel habits, or black or bloody stools  Genito-Urinary ROS: negative for - nocturia  Musculoskeletal ROS: positive for - joint pain right hip worsening with walking  Neurological ROS: no TIA or stroke symptoms  Dermatological ROS: negative for - rash or skin lesion changes    All other systems reviewed and are negative.       Objective:  Vitals:    06/06/22 1347   BP: 138/71   Pulse: 71   Resp: 16   Temp: 97.2 °F (36.2 °C)   TempSrc: Temporal   SpO2: 97%   Weight: 137 lb 3.2 oz (62.2 kg)   Height: 5' 5\" (1.651 m)       alert, well appearing, and in no distress, oriented to person, place, and time and normal appearing weight  Mental status - normal mood, behavior, speech, dress, motor activity, and thought processes  Chest - clear to auscultation, no wheezes, rales or rhonchi, symmetric air entry  Heart - normal rate, regular rhythm, normal S1, S2, no murmurs, rubs, clicks or gallops  Abdomen - soft, nontender, nondistended, no masses or organomegaly  Back exam - full range of motion, no tenderness, palpable spasm or pain on motion    LABS   Urinalysis +blood, +leukocyte esterase  TESTS    Assessment/Plan:    1. Urinary frequency    - AMB POC URINALYSIS DIP STICK AUTO W/ MICRO    2. Recurrent depression ([address]  Continue treatment  - sertraline (ZOLOFT) 100 mg tablet; Take 1 Tablet by mouth daily. Dispense: 90 Tablet; Refill: 1    3. Acute cystitis with hematuria  Start abx; culture to confirm susceptibility  - cephALEXin (KEFLEX) 500 mg capsule; Take 1 Capsule by mouth four (4) times daily for 10 days. Dispense: 40 Capsule; Refill: 0  - CULTURE, URINE; Future      Lab review: orders written for new lab studies as appropriate; see orders      I have discussed the diagnosis with the patient and the intended plan as seen in the above orders. The patient has received an after-visit summary and questions were answered concerning future plans. I have discussed medication side effects and warnings with the patient as well. I have reviewed the plan of care with the patient, accepted their input and they are in agreement with the treatment goals.

## 2022-06-09 LAB
BACTERIA SPEC CULT: ABNORMAL
BACTERIA SPEC CULT: ABNORMAL
CC UR VC: ABNORMAL
SERVICE CMNT-IMP: ABNORMAL

## 2022-08-22 ENCOUNTER — OFFICE VISIT (OUTPATIENT)
Dept: FAMILY MEDICINE CLINIC | Age: 77
End: 2022-08-22
Payer: MEDICARE

## 2022-08-22 VITALS
TEMPERATURE: 98.1 F | WEIGHT: 134 LBS | SYSTOLIC BLOOD PRESSURE: 134 MMHG | HEIGHT: 65 IN | HEART RATE: 64 BPM | DIASTOLIC BLOOD PRESSURE: 83 MMHG | BODY MASS INDEX: 22.33 KG/M2 | OXYGEN SATURATION: 97 % | RESPIRATION RATE: 16 BRPM

## 2022-08-22 DIAGNOSIS — Z00.00 MEDICARE ANNUAL WELLNESS VISIT, SUBSEQUENT: ICD-10-CM

## 2022-08-22 DIAGNOSIS — F41.9 ANXIETY: ICD-10-CM

## 2022-08-22 DIAGNOSIS — H66.91 OTITIS OF RIGHT EAR: Primary | ICD-10-CM

## 2022-08-22 PROCEDURE — G8428 CUR MEDS NOT DOCUMENT: HCPCS | Performed by: STUDENT IN AN ORGANIZED HEALTH CARE EDUCATION/TRAINING PROGRAM

## 2022-08-22 PROCEDURE — 1123F ACP DISCUSS/DSCN MKR DOCD: CPT | Performed by: STUDENT IN AN ORGANIZED HEALTH CARE EDUCATION/TRAINING PROGRAM

## 2022-08-22 PROCEDURE — G8399 PT W/DXA RESULTS DOCUMENT: HCPCS | Performed by: STUDENT IN AN ORGANIZED HEALTH CARE EDUCATION/TRAINING PROGRAM

## 2022-08-22 PROCEDURE — 1101F PT FALLS ASSESS-DOCD LE1/YR: CPT | Performed by: STUDENT IN AN ORGANIZED HEALTH CARE EDUCATION/TRAINING PROGRAM

## 2022-08-22 PROCEDURE — 1090F PRES/ABSN URINE INCON ASSESS: CPT | Performed by: STUDENT IN AN ORGANIZED HEALTH CARE EDUCATION/TRAINING PROGRAM

## 2022-08-22 PROCEDURE — G9717 DOC PT DX DEP/BP F/U NT REQ: HCPCS | Performed by: STUDENT IN AN ORGANIZED HEALTH CARE EDUCATION/TRAINING PROGRAM

## 2022-08-22 PROCEDURE — G8536 NO DOC ELDER MAL SCRN: HCPCS | Performed by: STUDENT IN AN ORGANIZED HEALTH CARE EDUCATION/TRAINING PROGRAM

## 2022-08-22 PROCEDURE — G8420 CALC BMI NORM PARAMETERS: HCPCS | Performed by: STUDENT IN AN ORGANIZED HEALTH CARE EDUCATION/TRAINING PROGRAM

## 2022-08-22 PROCEDURE — 99214 OFFICE O/P EST MOD 30 MIN: CPT | Performed by: STUDENT IN AN ORGANIZED HEALTH CARE EDUCATION/TRAINING PROGRAM

## 2022-08-22 PROCEDURE — G0439 PPPS, SUBSEQ VISIT: HCPCS | Performed by: STUDENT IN AN ORGANIZED HEALTH CARE EDUCATION/TRAINING PROGRAM

## 2022-08-22 RX ORDER — AMOXICILLIN 875 MG/1
875 TABLET, FILM COATED ORAL 2 TIMES DAILY
Qty: 10 TABLET | Refills: 0 | Status: SHIPPED | OUTPATIENT
Start: 2022-08-22 | End: 2022-08-27

## 2022-08-22 RX ORDER — ALPRAZOLAM 0.25 MG/1
TABLET ORAL
Qty: 60 TABLET | Refills: 0 | Status: SHIPPED | OUTPATIENT
Start: 2022-08-22

## 2022-08-22 RX ORDER — FLUTICASONE PROPIONATE 50 MCG
2 SPRAY, SUSPENSION (ML) NASAL DAILY
Qty: 1 EACH | Refills: 2 | Status: SHIPPED | OUTPATIENT
Start: 2022-08-22

## 2022-08-22 NOTE — PROGRESS NOTES
Vidal Louis is a 76 y.o. presents today for   Chief Complaint   Patient presents with    Nasal Congestion     Went to urgent care last month    Dizziness     Problem with balance. Fluid right ear for a month        Is someone accompanying this pt? No    Is the patient using any DME equipment during OV? No      Depression Screening:   3 most recent PHQ Screens 8/22/2022   Little interest or pleasure in doing things Not at all   Feeling down, depressed, irritable, or hopeless Not at all   Total Score PHQ 2 0       Health Maintenance: reviewed and discussed and ordered per Provider. Health Maintenance Due   Topic Date Due    COVID-19 Vaccine (1) Never done    DTaP/Tdap/Td series (1 - Tdap) Never done    Shingrix Vaccine Age 50> (1 of 2) Never done    Pneumococcal 65+ years (1 - PCV) Never done    Medicare Yearly Exam  08/18/2022         Coordination of Care:   1. \"Have you been to the ER, urgent care clinic since your last visit? Hospitalized since your last visit? \" Yes urgent care due to nasal congestion    2. \"Have you seen or consulted any other health care providers outside of the [address] [place] [address] since your last visit? \" No     3. For patients aged 39-70: Has the patient had a colonoscopy / FIT/ Cologuard? NA - based on age    If the patient is female:    4. For patients aged 41-77: Has the patient had a mammogram within the past 2 years? NA - based on age or sex    11. For patients aged 21-65: Has the patient had a pap smear? NA - based on age or sex     Advanced Directive:  1. Do you have an Advanced Directive? No     2. Would you like information on Advanced Directives?  No    Kari Escamilla CMA

## 2022-08-22 NOTE — PROGRESS NOTES
Sony Martin is a 76 y.o.  female and presents with    Chief Complaint   Patient presents with    Nasal Congestion     Went to urgent care last month    Dizziness     Problem with balance. Fluid right ear for a month              Subjective:    Nasal congestion - feels like she has some drainage on the R side of her ear. Sometimes feels like she can't hear out of her R ear for the last month or so. Does not feel pain, just fluid. She went to the urgent care and was given abx for uti but not for the ear. Feels like she has an issue w/ her balance, like she was going to fall but didn't. Feels the last couple of weeks it got better. Has been taking allergy pills w/o any relief. Patient would also like a refill on her Xanax. Patient states she only takes medicine as needed when she does feel like she can deal with the anxiety anymore. Last prescription was given over 1 year ago. States that she has about 4 pills left, however she would like to not be without it. Feels like lately her anxiety has been worse due to her daughter living in the house with her. Patient Active Problem List   Diagnosis Code    Anxiety F41.9    Recurrent UTI N39.0    Uterine prolapse N81.4    Cystocele, midline N81.11    Recurrent depression ([facility] [address] F33.9    Hypercholesteremia E78.00    Senile osteopenia M85.80      Past Medical History:   Diagnosis Date    Allergic rhinitis     Anxiety     Arthropathy     Atrophic vaginitis     Back pain     Chest pain, atypical     COVID-19 01/2022    Cystocele     Depression     Flank pain     HTN (hypertension)     Hypercholesteremia     Hyperlipidemia     Leg pain     Menopause     Metabolic disease     Mitral valve prolapse     with mitral regurgitation    Osteopenia     Recurrent UTI     right heel pain     Uterine prolapse     UTI (urinary tract infection)       Past Surgical History:   Procedure Laterality Date    HX BREAST BIOPSY Left ? 2010    Benign    HX CATARACT REMOVAL Right 2014 Family History   Problem Relation Age of Onset    Heart Disease Mother     Cancer Father     Heart Disease Brother     Heart Disease Sister      Social History     Socioeconomic History    Marital status:      Spouse name: Not on file    Number of children: Not on file    Years of education: Not on file    Highest education level: Not on file   Occupational History    Not on file   Tobacco Use    Smoking status: Never    Smokeless tobacco: Never   Vaping Use    Vaping Use: Never used   Substance and Sexual Activity    Alcohol use: No     Alcohol/week: 0.0 standard drinks    Drug use: No    Sexual activity: Never   Other Topics Concern    Not on file   Social History Narrative    Not on file     Social Determinants of Health     Financial Resource Strain: Not on file   Food Insecurity: Not on file   Transportation Needs: Not on file   Physical Activity: Not on file   Stress: Not on file   Social Connections: Not on file   Intimate Partner Violence: Not on file   Housing Stability: Not on file        Current Outpatient Medications   Medication Sig Dispense Refill    sertraline (ZOLOFT) 100 mg tablet Take 1 Tablet by mouth daily. 90 Tablet 1    diclofenac (VOLTAREN) 1 % gel Apply 2 g to affected area four (4) times daily. 100 g 0    estradioL (ESTRACE) 0.01 % (0.1 mg/gram) vaginal cream Apply 1g vaginally three times a week, apply with fingertip - do not use applicator. 42.5 g 3    ALPRAZolam (XANAX) 0.25 mg tablet TAKE 1 TABLET BY MOUTH EVERY DAY AS NEEDED FOR ANXIETY 60 Tab 0    cyanocobalamin 1,000 mcg tablet Take  by mouth.      loratadine (CLARITIN) 10 mg tablet Take  by mouth. Cholecalciferol, Vitamin D3, (VITAMIN D3) 2,000 unit cap capsule Take  by mouth.      cranberry fruit extract (CRANBERRY PO) Take  by Mouth. ROS   Review of Systems   Constitutional:  Negative for chills, fever and malaise/fatigue. HENT:  Positive for hearing loss.  Negative for congestion, ear discharge and ear pain.    Eyes:  Negative for blurred vision, pain and discharge. Respiratory:  Negative for cough and shortness of breath. Cardiovascular:  Negative for chest pain and palpitations. Gastrointestinal:  Negative for abdominal pain, nausea and vomiting. Genitourinary:  Negative for dysuria, frequency and urgency. Skin:  Negative for itching and rash. Neurological:  Negative for dizziness, seizures, loss of consciousness and headaches. Psychiatric/Behavioral:  Negative for substance abuse. Objective:  Vitals:    08/22/22 1157   BP: 134/83   Pulse: 64   Resp: 16   Temp: 98.1 °F (36.7 °C)   TempSrc: Temporal   SpO2: 97%   Weight: 134 lb (60.8 kg)   Height: 5' 5\" (1.651 m)   PainSc:   0 - No pain       Physical Exam  Vitals reviewed. Constitutional:       Appearance: Normal appearance. HENT:      Right Ear: Ear canal normal.      Ears:      Comments: R TM dull. Partially blocked by cerumen. Eyes:      General: No scleral icterus. Right eye: No discharge. Left eye: No discharge. Pulmonary:      Effort: Pulmonary effort is normal. No respiratory distress. Musculoskeletal:      Cervical back: Normal range of motion and neck supple. Neurological:      Mental Status: She is alert and oriented to person, place, and time. Cranial Nerves: No cranial nerve deficit. Psychiatric:         Mood and Affect: Mood normal.         Behavior: Behavior normal.         Thought Content: Thought content normal.         Judgment: Judgment normal.       LABS     TESTS      Assessment/Plan:    1. Anxiety  - ALPRAZolam (XANAX) 0.25 mg tablet; TAKE 1 TABLET BY MOUTH EVERY DAY AS NEEDED FOR ANXIETY  Dispense: 60 Tablet; Refill: 0    2. Otitis of right ear  If hearing not improve after medication, will discuss an audiology testing.  - amoxicillin (AMOXIL) 875 mg tablet; Take 1 Tablet by mouth two (2) times a day for 5 days. Dispense: 10 Tablet;  Refill: 0  - fluticasone propionate (FLONASE) 50 mcg/actuation nasal spray; 2 Sprays by Both Nostrils route daily. Dispense: 1 Each; Refill: 2    3. Medicare annual wellness visit, subsequent  See below         Lab review: no lab studies available for review at time of visit      I have discussed the diagnosis with the patient and the intended plan as seen in the above orders. The patient has received an after-visit summary and questions were answered concerning future plans. I have discussed medication side effects and warnings with the patient as well. I have reviewed the plan of care with the patient, accepted their input and they are in agreement with the treatment goals. Junito Rosario MD     -------------------------------------------------------------------------------------------        This is the Subsequent Medicare Annual Wellness Exam, performed 12 months or more after the Initial AWV or the last Subsequent AWV    I have reviewed the patient's medical history in detail and updated the computerized patient record. Assessment/Plan   Education and counseling provided:  Are appropriate based on today's review and evaluation    1. Otitis of right ear  2. Anxiety  The following orders have not been finalized:  -     ALPRAZolam (XANAX) 0.25 mg tablet  3. Medicare annual wellness visit, subsequent       Depression Risk Factor Screening     3 most recent PHQ Screens 8/22/2022   Little interest or pleasure in doing things Not at all   Feeling down, depressed, irritable, or hopeless Not at all   Total Score PHQ 2 0       Alcohol & Drug Abuse Risk Screen    Do you average more than 1 drink per night or more than 7 drinks a week:  No    On any one occasion in the past three months have you have had more than 3 drinks containing alcohol:  No          Functional Ability and Level of Safety    Hearing:  Pt is getting treated today for otitis, but if not improved then will send for audiologist      Activities of Daily Living:   The home contains: no safety equipment. Patient does total self care      Ambulation: with no difficulty     Fall Risk:  Fall Risk Assessment, last 12 mths 8/22/2022   Able to walk? Yes   Fall in past 12 months? 0   Do you feel unsteady? 1   Are you worried about falling 1   Is TUG test greater than 12 seconds? -   Number of falls in past 12 months 0   Fall with injury? 0      Abuse Screen:  Patient is not abused       Cognitive Screening    Has your family/caregiver stated any concerns about your memory: no     Health Maintenance Due     Health Maintenance Due   Topic Date Due    COVID-19 Vaccine (1) Never done    DTaP/Tdap/Td series (1 - Tdap) Never done    Shingrix Vaccine Age 50> (1 of 2) Never done    Pneumococcal 65+ years (1 - PCV) Never done       Patient Care Team   Patient Care Team:  Jeff Ventura MD as PCP - General (Family Medicine)  Jeff Ventura MD as PCP - REHABILITATION [facility] Empaneled Provider  Suleman Kenny NP as Nurse Practitioner (Urology)  Anayeli Gutiérrez MD (Female Pelvic Medicine and Reconstructive Surgery)  Sanjuana Mata MD (Ophthalmology)    History     Patient Active Problem List   Diagnosis Code    Anxiety F41.9    Recurrent UTI N39.0    Uterine prolapse N81.4    Cystocele, midline N81.11    Recurrent depression ([address] F33.9    Hypercholesteremia E78.00    Senile osteopenia M85.80     Past Medical History:   Diagnosis Date    Allergic rhinitis     Anxiety     Arthropathy     Atrophic vaginitis     Back pain     Chest pain, atypical     COVID-19 01/2022    Cystocele     Depression     Flank pain     HTN (hypertension)     Hypercholesteremia     Hyperlipidemia     Leg pain     Menopause     Metabolic disease     Mitral valve prolapse     with mitral regurgitation    Osteopenia     Recurrent UTI     right heel pain     Uterine prolapse     UTI (urinary tract infection)       Past Surgical History:   Procedure Laterality Date    HX BREAST BIOPSY Left ? 2010    Benign    HX CATARACT REMOVAL Right 2014     Current Outpatient Medications   Medication Sig Dispense Refill    sertraline (ZOLOFT) 100 mg tablet Take 1 Tablet by mouth daily. 90 Tablet 1    diclofenac (VOLTAREN) 1 % gel Apply 2 g to affected area four (4) times daily. 100 g 0    estradioL (ESTRACE) 0.01 % (0.1 mg/gram) vaginal cream Apply 1g vaginally three times a week, apply with fingertip - do not use applicator. 42.5 g 3    ALPRAZolam (XANAX) 0.25 mg tablet TAKE 1 TABLET BY MOUTH EVERY DAY AS NEEDED FOR ANXIETY 60 Tab 0    cyanocobalamin 1,000 mcg tablet Take  by mouth.      loratadine (CLARITIN) 10 mg tablet Take  by mouth. Cholecalciferol, Vitamin D3, (VITAMIN D3) 2,000 unit cap capsule Take  by mouth.      cranberry fruit extract (CRANBERRY PO) Take  by Mouth.        Allergies   Allergen Reactions    Nitrofurantoin Hives and Rash    Atorvastatin Myalgia    Pyridium [Phenazopyridine] Itching    Sulfa (Sulfonamide Antibiotics) Rash       Family History   Problem Relation Age of Onset    Heart Disease Mother     Cancer Father     Heart Disease Brother     Heart Disease Sister      Social History     Tobacco Use    Smoking status: Never    Smokeless tobacco: Never   Substance Use Topics    Alcohol use: No     Alcohol/week: 0.0 standard drinks         Jeannine Bolton MD

## 2022-08-22 NOTE — PATIENT INSTRUCTIONS

## 2022-09-06 ENCOUNTER — HOSPITAL ENCOUNTER (OUTPATIENT)
Dept: LAB | Age: 77
Discharge: HOME | End: 2022-09-06
Payer: MEDICARE

## 2022-09-06 DIAGNOSIS — Z00.00 BLOOD TESTS FOR ROUTINE GENERAL PHYSICAL EXAMINATION: ICD-10-CM

## 2022-09-06 LAB
ALBUMIN SERPL-MCNC: 3.9 G/DL (ref 3.4–5)
ALBUMIN/GLOB SERPL: 1.1 {RATIO} (ref 0.8–1.7)
ALP SERPL-CCNC: 73 U/L (ref 45–117)
ALT SERPL-CCNC: 24 U/L (ref 13–56)
ANION GAP SERPL CALC-SCNC: 9 MMOL/L (ref 3–18)
APPEARANCE UR: ABNORMAL
AST SERPL-CCNC: 22 U/L (ref 10–38)
BACTERIA URNS QL MICRO: NEGATIVE /HPF
BASOPHILS # BLD: 0 K/UL (ref 0–0.1)
BASOPHILS NFR BLD: 1 % (ref 0–2)
BILIRUB SERPL-MCNC: 1.6 MG/DL (ref 0.2–1)
BILIRUB UR QL: NEGATIVE
BUN SERPL-MCNC: 15 MG/DL (ref 7–18)
BUN/CREAT SERPL: 19 (ref 12–20)
CALCIUM SERPL-MCNC: 9.8 MG/DL (ref 8.5–10.1)
CAOX CRY URNS QL MICRO: ABNORMAL
CHLORIDE SERPL-SCNC: 106 MMOL/L (ref 100–111)
CO2 SERPL-SCNC: 27 MMOL/L (ref 21–32)
COLOR UR: YELLOW
CREAT SERPL-MCNC: 0.79 MG/DL (ref 0.6–1.3)
DIFFERENTIAL METHOD BLD: NORMAL
EOSINOPHIL # BLD: 0.2 K/UL (ref 0–0.4)
EOSINOPHIL NFR BLD: 4 % (ref 0–5)
EPITH CASTS URNS QL MICRO: ABNORMAL /LPF (ref 0–5)
ERYTHROCYTE [DISTWIDTH] IN BLOOD BY AUTOMATED COUNT: 13.5 % (ref 11.6–14.5)
GLOBULIN SER CALC-MCNC: 3.4 G/DL (ref 2–4)
GLUCOSE SERPL-MCNC: 111 MG/DL (ref 74–99)
GLUCOSE UR STRIP.AUTO-MCNC: NEGATIVE MG/DL
HCT VFR BLD AUTO: 43.6 % (ref 35–45)
HGB BLD-MCNC: 13.7 G/DL (ref 12–16)
HGB UR QL STRIP: ABNORMAL
IMM GRANULOCYTES # BLD AUTO: 0 K/UL (ref 0–0.04)
IMM GRANULOCYTES NFR BLD AUTO: 0 % (ref 0–0.5)
KETONES UR QL STRIP.AUTO: ABNORMAL MG/DL
LEUKOCYTE ESTERASE UR QL STRIP.AUTO: ABNORMAL
LYMPHOCYTES # BLD: 1.3 K/UL (ref 0.9–3.6)
LYMPHOCYTES NFR BLD: 22 % (ref 21–52)
MCH RBC QN AUTO: 29.1 PG (ref 24–34)
MCHC RBC AUTO-ENTMCNC: 31.4 G/DL (ref 31–37)
MCV RBC AUTO: 92.8 FL (ref 78–100)
MONOCYTES # BLD: 0.5 K/UL (ref 0.05–1.2)
MONOCYTES NFR BLD: 9 % (ref 3–10)
NEUTS SEG # BLD: 4 K/UL (ref 1.8–8)
NEUTS SEG NFR BLD: 65 % (ref 40–73)
NITRITE UR QL STRIP.AUTO: NEGATIVE
NRBC # BLD: 0 K/UL (ref 0–0.01)
NRBC BLD-RTO: 0 PER 100 WBC
PH UR STRIP: 6 [PH] (ref 5–8)
PLATELET # BLD AUTO: 224 K/UL (ref 135–420)
PMV BLD AUTO: 11.3 FL (ref 9.2–11.8)
POTASSIUM SERPL-SCNC: 4.2 MMOL/L (ref 3.5–5.5)
PROT SERPL-MCNC: 7.3 G/DL (ref 6.4–8.2)
PROT UR STRIP-MCNC: 30 MG/DL
RBC # BLD AUTO: 4.7 M/UL (ref 4.2–5.3)
RBC #/AREA URNS HPF: NEGATIVE /HPF (ref 0–5)
SODIUM SERPL-SCNC: 142 MMOL/L (ref 136–145)
SP GR UR REFRACTOMETRY: 1.02 (ref 1–1.03)
UROBILINOGEN UR QL STRIP.AUTO: 0.2 EU/DL (ref 0.2–1)
WBC # BLD AUTO: 6.1 K/UL (ref 4.6–13.2)
WBC URNS QL MICRO: ABNORMAL /HPF (ref 0–4)
YEAST URNS QL MICRO: ABNORMAL

## 2022-09-06 PROCEDURE — 80053 COMPREHEN METABOLIC PANEL: CPT

## 2022-09-06 PROCEDURE — 85025 COMPLETE CBC W/AUTO DIFF WBC: CPT

## 2022-09-06 PROCEDURE — 81001 URINALYSIS AUTO W/SCOPE: CPT

## 2022-09-06 PROCEDURE — 36415 COLL VENOUS BLD VENIPUNCTURE: CPT

## 2022-11-29 ENCOUNTER — OFFICE VISIT (OUTPATIENT)
Dept: FAMILY MEDICINE CLINIC | Age: 77
End: 2022-11-29
Payer: MEDICARE

## 2022-11-29 VITALS
RESPIRATION RATE: 16 BRPM | HEIGHT: 65 IN | BODY MASS INDEX: 21.99 KG/M2 | HEART RATE: 82 BPM | WEIGHT: 132 LBS | SYSTOLIC BLOOD PRESSURE: 134 MMHG | OXYGEN SATURATION: 96 % | DIASTOLIC BLOOD PRESSURE: 72 MMHG | TEMPERATURE: 97.6 F

## 2022-11-29 DIAGNOSIS — R35.0 URINE FREQUENCY: Primary | ICD-10-CM

## 2022-11-29 DIAGNOSIS — N30.01 ACUTE CYSTITIS WITH HEMATURIA: ICD-10-CM

## 2022-11-29 LAB
BILIRUB UR QL STRIP: NEGATIVE
GLUCOSE UR-MCNC: NEGATIVE MG/DL
KETONES P FAST UR STRIP-MCNC: NORMAL MG/DL
PH UR STRIP: 6 [PH] (ref 4.6–8)
PROT UR QL STRIP: NORMAL
SP GR UR STRIP: 1.02 (ref 1–1.03)
UA UROBILINOGEN AMB POC: NORMAL (ref 0.2–1)
URINALYSIS CLARITY POC: CLEAR
URINALYSIS COLOR POC: YELLOW
URINE BLOOD POC: NORMAL
URINE LEUKOCYTES POC: NORMAL
URINE NITRITES POC: POSITIVE

## 2022-11-29 PROCEDURE — G9717 DOC PT DX DEP/BP F/U NT REQ: HCPCS | Performed by: FAMILY MEDICINE

## 2022-11-29 PROCEDURE — G8420 CALC BMI NORM PARAMETERS: HCPCS | Performed by: FAMILY MEDICINE

## 2022-11-29 PROCEDURE — 1090F PRES/ABSN URINE INCON ASSESS: CPT | Performed by: FAMILY MEDICINE

## 2022-11-29 PROCEDURE — 99214 OFFICE O/P EST MOD 30 MIN: CPT | Performed by: FAMILY MEDICINE

## 2022-11-29 PROCEDURE — 1101F PT FALLS ASSESS-DOCD LE1/YR: CPT | Performed by: FAMILY MEDICINE

## 2022-11-29 PROCEDURE — G8427 DOCREV CUR MEDS BY ELIG CLIN: HCPCS | Performed by: FAMILY MEDICINE

## 2022-11-29 PROCEDURE — G8536 NO DOC ELDER MAL SCRN: HCPCS | Performed by: FAMILY MEDICINE

## 2022-11-29 PROCEDURE — 1123F ACP DISCUSS/DSCN MKR DOCD: CPT | Performed by: FAMILY MEDICINE

## 2022-11-29 PROCEDURE — 81001 URINALYSIS AUTO W/SCOPE: CPT | Performed by: FAMILY MEDICINE

## 2022-11-29 PROCEDURE — G8399 PT W/DXA RESULTS DOCUMENT: HCPCS | Performed by: FAMILY MEDICINE

## 2022-11-29 RX ORDER — CIPROFLOXACIN 500 MG/1
500 TABLET ORAL 2 TIMES DAILY
Qty: 20 TABLET | Refills: 0 | Status: SHIPPED | OUTPATIENT
Start: 2022-11-29 | End: 2022-12-09

## 2022-11-29 NOTE — PROGRESS NOTES
London Blake is a 76 y.o.  female and presents with    Chief Complaint   Patient presents with    Urinary Frequency         Subjective:  Urinary Tract Infection  Patient complains of frequency, urgency, burning with urination. Onset was 2 days ago, gradually worsening since that time. Patient denies back pain, congestion, cough, fever, headache, rhinitis, sorethroat, stomach ache, and vaginal discharge. Patient does not have a history of recurrent UTI. Patient does not have a history of pyelonephritis. ROS     All other systems reviewed and are negative. Objective:  Vitals:    11/29/22 1446   BP: 134/72   Pulse: 82   Resp: 16   Temp: 97.6 °F (36.4 °C)   TempSrc: Temporal   SpO2: 96%   Weight: 132 lb (59.9 kg)   Height: 5' 5\" (1.651 m)   PainSc:   0 - No pain       alert, well appearing, and in no distress, oriented to person, place, and time, and normal appearing weight  Mental status - normal mood, behavior, speech, dress, motor activity, and thought processes  Chest - clear to auscultation, no wheezes, rales or rhonchi, symmetric air entry  Heart - normal rate, regular rhythm, normal S1, S2, no murmurs, rubs, clicks or gallops  Back exam - no CVA ttp    LABS   U/A + blood, LE, Nitrite  TESTS      Assessment/Plan:    1. Urine frequency  Start abx  - AMB POC URINALYSIS DIP STICK AUTO W/ MICRO  - ciprofloxacin HCl (CIPRO) 500 mg tablet; Take 1 Tablet by mouth two (2) times a day for 10 days. Dispense: 20 Tablet; Refill: 0  2. Acute cystitis with hematuria      Lab review: labs reviewed, I note that urinalysis abnormal       I have discussed the diagnosis with the patient and the intended plan as seen in the above orders. The patient has received an after-visit summary and questions were answered concerning future plans. I have discussed medication side effects and warnings with the patient as well.  I have reviewed the plan of care with the patient, accepted their input and they are in agreement with the treatment goals.

## 2022-11-29 NOTE — PROGRESS NOTES
*ATTENTION:  This note has been created by a medical student for educational purposes only. Please do not refer to the content of this note for clinical decision-making, billing, or other purposes. Please see attending physicians note to obtain clinical information on this patient. *     Subjective:  Donis Pop is a 76 y.o. female who presents to the office with a chief complaint of urinary problems. Patient states that for the past few days she has had increased urinary frequency and began having pain with urination yesterday. She notes that she frequently gets UTIs and knows her body well so she can tell when she is getting another UTI. Patient denies any hematuria or cloudiness in her urine and denies any fevers. Patient denies CP, SOB, headache, syncope, abdominal pain, nausea, vomiting, constipation, or diarrhea. Assessment/Plan:   UTI - most likely cytitis instead of pyelonephritis due to negative CVA tenderness. Prescribed antibiotics for 5 days and an additional 5 days to have for prophylactic use. Objective:  Patient Vitals for the past 12 hrs:   Temp Pulse Resp BP SpO2   11/29/22 1446 97.6 °F (36.4 °C) 82 16 134/72 96 %        Recent Results (from the past 12 hour(s))   AMB POC URINALYSIS DIP STICK AUTO W/ MICRO    Collection Time: 11/29/22  2:58 PM   Result Value Ref Range    Color (UA POC) Yellow     Clarity (UA POC) Clear     Glucose (UA POC) Negative Negative    Bilirubin (UA POC) Negative Negative    Ketones (UA POC) Trace Negative    Specific gravity (UA POC) 1.025 1.001 - 1.035    Blood (UA POC) 1+ Negative    pH (UA POC) 6.0 4.6 - 8.0    Protein (UA POC) Trace Negative    Urobilinogen (UA POC) 0.2 mg/dL 0.2 - 1    Nitrites (UA POC) Positive Negative    Leukocyte esterase (UA POC) 2+ Negative        Physical Exam  Vitals and nursing note reviewed. Constitutional:       General: She is not in acute distress. Appearance: She is normal weight.  She is not ill-appearing, toxic-appearing or diaphoretic. HENT:      Head: Normocephalic and atraumatic. Right Ear: External ear normal.      Left Ear: External ear normal.      Nose: Nose normal.      Mouth/Throat:      Mouth: Mucous membranes are moist.      Pharynx: Oropharynx is clear. No oropharyngeal exudate or posterior oropharyngeal erythema. Eyes:      General: No scleral icterus. Right eye: No discharge. Left eye: No discharge. Extraocular Movements: Extraocular movements intact. Conjunctiva/sclera: Conjunctivae normal.      Pupils: Pupils are equal, round, and reactive to light. Neck:      Vascular: No carotid bruit. Cardiovascular:      Rate and Rhythm: Normal rate and regular rhythm. Pulses: Normal pulses. Heart sounds: Normal heart sounds. No murmur heard. No gallop. Pulmonary:      Effort: Pulmonary effort is normal. No respiratory distress. Breath sounds: Normal breath sounds. No stridor. No wheezing, rhonchi or rales. Abdominal:      General: Abdomen is flat. Bowel sounds are normal. There is no distension. Palpations: Abdomen is soft. There is no mass. Tenderness: There is no abdominal tenderness. There is no right CVA tenderness, left CVA tenderness, guarding or rebound. Hernia: No hernia is present. Comments: Negative CVA tenderness bilaterally   Musculoskeletal:         General: No swelling, tenderness, deformity or signs of injury. Normal range of motion. Cervical back: Normal range of motion and neck supple. No rigidity or tenderness. Right lower leg: No edema. Left lower leg: No edema. Lymphadenopathy:      Cervical: No cervical adenopathy. Skin:     General: Skin is warm and dry. Coloration: Skin is not jaundiced or pale. Comments: Poor skin turgor   Neurological:      General: No focal deficit present. Mental Status: She is alert and oriented to person, place, and time. Mental status is at baseline. Psychiatric:         Mood and Affect: Mood normal.         Behavior: Behavior normal.         Thought Content:  Thought content normal.         Judgment: Judgment normal.

## 2022-11-29 NOTE — PROGRESS NOTES
Andrés Ramos presents today for   Chief Complaint   Patient presents with    Urinary Frequency       Is someone accompanying this pt? no    Is the patient using any DME equipment during OV? no    Depression Screening:  3 most recent PHQ Screens 11/29/2022   Little interest or pleasure in doing things Not at all   Feeling down, depressed, irritable, or hopeless Not at all   Total Score PHQ 2 0       Learning Assessment:  Learning Assessment 2/22/2022   PRIMARY LEARNER Patient   HIGHEST LEVEL OF EDUCATION - PRIMARY LEARNER  GRADUATED HIGH SCHOOL OR GED   BARRIERS PRIMARY LEARNER NONE   CO-LEARNER CAREGIVER -   PRIMARY LANGUAGE ENGLISH   LEARNER PREFERENCE PRIMARY READING   ANSWERED BY patient    RELATIONSHIP SELF       Abuse Screening:  Abuse Screening Questionnaire 2/22/2022   Do you ever feel afraid of your partner? N   Are you in a relationship with someone who physically or mentally threatens you? N   Is it safe for you to go home? Y       Fall Risk  Fall Risk Assessment, last 12 mths 11/29/2022   Able to walk? Yes   Fall in past 12 months? 0   Do you feel unsteady? 0   Are you worried about falling 0   Is TUG test greater than 12 seconds? -   Number of falls in past 12 months -   Fall with injury? -       Health Maintenance reviewed and discussed and ordered per Provider. Health Maintenance Due   Topic Date Due    COVID-19 Vaccine (1) Never done    DTaP/Tdap/Td series (1 - Tdap) Never done    Shingrix Vaccine Age 50> (1 of 2) Never done    Pneumococcal 65+ years (1 - PCV) Never done    Flu Vaccine (1) Never done   . 1. \"Have you been to the ER, urgent care clinic since your last visit? Hospitalized since your last visit? \" No    2. \"Have you seen or consulted any other health care providers outside of the [address] [place] [address] since your last visit? \" No     3. For patients over 45: Has the patient had a colonoscopy? Yes - no Care Gap present     If the patient is female:    4.  For patients over 40: Has the patient had a mammogram? Yes - no Care Gap present    5. For patients over 21: Has the patient had a pap smear?  Yes - no Care Gap present

## 2023-01-24 ENCOUNTER — OFFICE VISIT (OUTPATIENT)
Dept: FAMILY MEDICINE CLINIC | Age: 78
End: 2023-01-24
Payer: MEDICARE

## 2023-01-24 VITALS
WEIGHT: 132.6 LBS | SYSTOLIC BLOOD PRESSURE: 136 MMHG | RESPIRATION RATE: 16 BRPM | TEMPERATURE: 97.3 F | BODY MASS INDEX: 22.09 KG/M2 | HEIGHT: 65 IN | DIASTOLIC BLOOD PRESSURE: 79 MMHG | OXYGEN SATURATION: 98 % | HEART RATE: 64 BPM

## 2023-01-24 DIAGNOSIS — F33.9 RECURRENT DEPRESSION (HCC): ICD-10-CM

## 2023-01-24 RX ORDER — SERTRALINE HYDROCHLORIDE 100 MG/1
100 TABLET, FILM COATED ORAL DAILY
Qty: 90 TABLET | Refills: 1 | Status: SHIPPED | OUTPATIENT
Start: 2023-01-24

## 2023-01-24 NOTE — PROGRESS NOTES
Melissa Vega is a 76 y.o.  female and presents with    Chief Complaint   Patient presents with    Follow-up           Subjective:    Has an appt w/ urologist - she thinks is in April. States she is doing well on Zoloft. Denies side effects. Would like to continue w/ her medication at the present dose, Feels like her depression and anxiety are controlled. Patient Active Problem List   Diagnosis Code    Anxiety F41.9    Recurrent UTI N39.0    Uterine prolapse N81.4    Cystocele, midline N81.11    Recurrent depression ([address] F33.9    Hypercholesteremia E78.00    Senile osteopenia M85.80      Past Medical History:   Diagnosis Date    Allergic rhinitis     Anxiety     Arthropathy     Atrophic vaginitis     Back pain     Chest pain, atypical     COVID-19 01/2022    Cystocele     Depression     Flank pain     HTN (hypertension)     Hypercholesteremia     Hyperlipidemia     Leg pain     Menopause     Metabolic disease     Mitral valve prolapse     with mitral regurgitation    Osteopenia     Recurrent UTI     right heel pain     Uterine prolapse     UTI (urinary tract infection)       Past Surgical History:   Procedure Laterality Date    HX BREAST BIOPSY Left ? 2010    Benign    HX CATARACT REMOVAL Right 2014      Family History   Problem Relation Age of Onset    Heart Disease Mother     Cancer Father     Heart Disease Brother     Heart Disease Sister      Social History     Socioeconomic History    Marital status:      Spouse name: Not on file    Number of children: Not on file    Years of education: Not on file    Highest education level: Not on file   Occupational History    Not on file   Tobacco Use    Smoking status: Never    Smokeless tobacco: Never   Vaping Use    Vaping Use: Never used   Substance and Sexual Activity    Alcohol use: No     Alcohol/week: 0.0 standard drinks    Drug use: No    Sexual activity: Never   Other Topics Concern    Not on file   Social History Narrative    Not on file     Social Determinants of Health     Financial Resource Strain: Not on file   Food Insecurity: Not on file   Transportation Needs: Not on file   Physical Activity: Not on file   Stress: Not on file   Social Connections: Not on file   Intimate Partner Violence: Not on file   Housing Stability: Not on file        Current Outpatient Medications   Medication Sig Dispense Refill    ALPRAZolam (XANAX) 0.25 mg tablet TAKE 1 TABLET BY MOUTH EVERY DAY AS NEEDED FOR ANXIETY 60 Tablet 0    fluticasone propionate (FLONASE) 50 mcg/actuation nasal spray 2 Sprays by Both Nostrils route daily. 1 Each 2    sertraline (ZOLOFT) 100 mg tablet Take 1 Tablet by mouth daily. 90 Tablet 1    diclofenac (VOLTAREN) 1 % gel Apply 2 g to affected area four (4) times daily. 100 g 0    estradioL (ESTRACE) 0.01 % (0.1 mg/gram) vaginal cream Apply 1g vaginally three times a week, apply with fingertip - do not use applicator. 42.5 g 3    cyanocobalamin 1,000 mcg tablet Take  by mouth.      loratadine (CLARITIN) 10 mg tablet Take  by mouth. Cholecalciferol, Vitamin D3, (VITAMIN D3) 2,000 unit cap capsule Take  by mouth.      cranberry fruit extract (CRANBERRY PO) Take  by Mouth. ROS   Review of Systems   Constitutional:  Negative for chills, fever and malaise/fatigue. HENT:  Negative for congestion, ear discharge and ear pain. Eyes:  Negative for blurred vision, pain and discharge. Respiratory:  Negative for cough, sputum production and shortness of breath. Cardiovascular:  Negative for chest pain and palpitations. Gastrointestinal:  Negative for abdominal pain, nausea and vomiting. Genitourinary:  Negative for dysuria, frequency and urgency. Skin:  Negative for itching and rash. Neurological:  Negative for dizziness, seizures, loss of consciousness and headaches. Psychiatric/Behavioral:  Negative for substance abuse.           Objective:  Vitals:    01/24/23 1141   BP: 136/79   Pulse: 64   Resp: 16   Temp: 97.3 °F (36.3 °C)   TempSrc: Temporal   SpO2: 98%   Weight: 132 lb 9.6 oz (60.1 kg)   Height: 5' 5\" (1.651 m)   PainSc:   0 - No pain       Physical Exam  Vitals reviewed. Constitutional:       Appearance: Normal appearance. Eyes:      General: No scleral icterus. Right eye: No discharge. Left eye: No discharge. Cardiovascular:      Rate and Rhythm: Normal rate and regular rhythm. Pulmonary:      Effort: Pulmonary effort is normal. No respiratory distress. Breath sounds: Normal breath sounds. Musculoskeletal:      Cervical back: Normal range of motion and neck supple. Neurological:      Mental Status: She is alert and oriented to person, place, and time. Cranial Nerves: No cranial nerve deficit. Psychiatric:         Mood and Affect: Mood normal.         Behavior: Behavior normal.         Thought Content: Thought content normal.         Judgment: Judgment normal.         LABS     TESTS      Assessment/Plan:    1. Recurrent depression (HCC)  stable  - sertraline (ZOLOFT) 100 mg tablet; Take 1 Tablet by mouth daily. Dispense: 90 Tablet; Refill: 1  - TSH 3RD GENERATION; Future  - METABOLIC PANEL, COMPREHENSIVE; Future  - CBC WITH AUTOMATED DIFF; Future      Lab review: orders written for new lab studies as appropriate; see orders      I have discussed the diagnosis with the patient and the intended plan as seen in the above orders. The patient has received an after-visit summary and questions were answered concerning future plans. I have discussed medication side effects and warnings with the patient as well. I have reviewed the plan of care with the patient, accepted their input and they are in agreement with the treatment goals.          Carlos Prescott MD none

## 2023-01-24 NOTE — PROGRESS NOTES
Lynette Christopher is a 76 y.o. presents today for   Chief Complaint   Patient presents with    Follow-up     Is someone accompanying this pt? No    Is the patient using any DME equipment during OV? No    Visit Vitals  /79 (BP 1 Location: Left upper arm, BP Patient Position: Sitting, BP Cuff Size: Adult)   Pulse 64   Temp 97.3 °F (36.3 °C) (Temporal)   Resp 16   Ht 5' 5\" (1.651 m)   Wt 132 lb 9.6 oz (60.1 kg)   SpO2 98%   BMI 22.07 kg/m²       Depression Screening:   3 most recent PHQ Screens 1/24/2023   Little interest or pleasure in doing things Not at all   Feeling down, depressed, irritable, or hopeless Not at all   Total Score PHQ 2 0       Health Maintenance: reviewed and discussed and ordered per Provider. Health Maintenance Due   Topic Date Due    COVID-19 Vaccine (1) Never done         Coordination of Care:   1. \"Have you been to the ER, urgent care clinic since your last visit? Hospitalized since your last visit? \" Urgent care for uti 3 weeks ago     2. \"Have you seen or consulted any other health care providers outside of the [address] [place] [address] since your last visit? \" No     3. For patients aged 39-70: Has the patient had a colonoscopy / FIT/ Cologuard? NA - based on age    If the patient is female:    4. For patients aged 41-77: Has the patient had a mammogram within the past 2 years? NA - based on age or sex    11. For patients aged 21-65: Has the patient had a pap smear?  NA - based on age or sex     Danielle Poe

## 2023-01-28 DIAGNOSIS — F33.9 RECURRENT DEPRESSION (HCC): ICD-10-CM

## 2023-01-30 RX ORDER — SERTRALINE HYDROCHLORIDE 100 MG/1
100 TABLET, FILM COATED ORAL DAILY
Qty: 90 TABLET | Refills: 1 | Status: SHIPPED | OUTPATIENT
Start: 2023-01-30

## 2023-04-25 ENCOUNTER — OFFICE VISIT (OUTPATIENT)
Facility: CLINIC | Age: 78
End: 2023-04-25
Payer: MEDICARE

## 2023-04-25 VITALS
TEMPERATURE: 97.2 F | BODY MASS INDEX: 22.29 KG/M2 | DIASTOLIC BLOOD PRESSURE: 69 MMHG | HEART RATE: 70 BPM | WEIGHT: 133.8 LBS | HEIGHT: 65 IN | OXYGEN SATURATION: 97 % | SYSTOLIC BLOOD PRESSURE: 114 MMHG

## 2023-04-25 DIAGNOSIS — L30.9 DERMATITIS: ICD-10-CM

## 2023-04-25 DIAGNOSIS — N30.01 ACUTE CYSTITIS WITH HEMATURIA: Primary | ICD-10-CM

## 2023-04-25 LAB
BILIRUBIN, URINE, POC: NORMAL
BLOOD URINE, POC: NORMAL
GLUCOSE URINE, POC: NEGATIVE
KETONES, URINE, POC: NEGATIVE
LEUKOCYTE ESTERASE, URINE, POC: NORMAL
NITRITE, URINE, POC: NEGATIVE
PH, URINE, POC: 5.5 (ref 4.6–8)
PROTEIN,URINE, POC: NORMAL
SPECIFIC GRAVITY, URINE, POC: 1.03 (ref 1–1.03)
URINALYSIS CLARITY, POC: NORMAL
URINALYSIS COLOR, POC: YELLOW
UROBILINOGEN, POC: NORMAL

## 2023-04-25 PROCEDURE — G8420 CALC BMI NORM PARAMETERS: HCPCS

## 2023-04-25 PROCEDURE — G8399 PT W/DXA RESULTS DOCUMENT: HCPCS

## 2023-04-25 PROCEDURE — 81003 URINALYSIS AUTO W/O SCOPE: CPT

## 2023-04-25 PROCEDURE — 1036F TOBACCO NON-USER: CPT

## 2023-04-25 PROCEDURE — G8427 DOCREV CUR MEDS BY ELIG CLIN: HCPCS

## 2023-04-25 PROCEDURE — 1123F ACP DISCUSS/DSCN MKR DOCD: CPT

## 2023-04-25 PROCEDURE — 99203 OFFICE O/P NEW LOW 30 MIN: CPT

## 2023-04-25 PROCEDURE — 1090F PRES/ABSN URINE INCON ASSESS: CPT

## 2023-04-25 RX ORDER — DIAPER,BRIEF,INFANT-TODD,DISP
EACH MISCELLANEOUS
Qty: 30 G | Refills: 1 | Status: SHIPPED | OUTPATIENT
Start: 2023-04-25 | End: 2023-05-02

## 2023-04-25 RX ORDER — CIPROFLOXACIN 500 MG/1
500 TABLET, FILM COATED ORAL 2 TIMES DAILY
Qty: 20 TABLET | Refills: 0 | Status: SHIPPED | OUTPATIENT
Start: 2023-04-25 | End: 2023-05-05

## 2023-04-25 SDOH — ECONOMIC STABILITY: FOOD INSECURITY: WITHIN THE PAST 12 MONTHS, YOU WORRIED THAT YOUR FOOD WOULD RUN OUT BEFORE YOU GOT MONEY TO BUY MORE.: NEVER TRUE

## 2023-04-25 SDOH — ECONOMIC STABILITY: FOOD INSECURITY: WITHIN THE PAST 12 MONTHS, THE FOOD YOU BOUGHT JUST DIDN'T LAST AND YOU DIDN'T HAVE MONEY TO GET MORE.: NEVER TRUE

## 2023-04-25 SDOH — ECONOMIC STABILITY: INCOME INSECURITY: HOW HARD IS IT FOR YOU TO PAY FOR THE VERY BASICS LIKE FOOD, HOUSING, MEDICAL CARE, AND HEATING?: NOT HARD AT ALL

## 2023-04-25 SDOH — ECONOMIC STABILITY: HOUSING INSECURITY
IN THE LAST 12 MONTHS, WAS THERE A TIME WHEN YOU DID NOT HAVE A STEADY PLACE TO SLEEP OR SLEPT IN A SHELTER (INCLUDING NOW)?: NO

## 2023-04-25 ASSESSMENT — PATIENT HEALTH QUESTIONNAIRE - PHQ9
SUM OF ALL RESPONSES TO PHQ QUESTIONS 1-9: 0
1. LITTLE INTEREST OR PLEASURE IN DOING THINGS: 0
SUM OF ALL RESPONSES TO PHQ QUESTIONS 1-9: 0
2. FEELING DOWN, DEPRESSED OR HOPELESS: 0
SUM OF ALL RESPONSES TO PHQ9 QUESTIONS 1 & 2: 0

## 2023-04-25 NOTE — PROGRESS NOTES
Miguelangel Regan is a 76 y.o. presents today for   Chief Complaint   Patient presents with    Urinary Frequency     Pressure and discomfort for 1 weeks. Rash     Right lower abdomen x 1week     Is someone accompanying this pt? no    Is the patient using any DME equipment during OV? no  Vitals:    04/25/23 1527   BP: 114/69   Pulse: 70   Temp: 97.2 °F (36.2 °C)   SpO2: 97%       Depression Screening:   PHQ-9 Questionaire 4/25/2023 1/24/2023 11/29/2022 8/22/2022 6/6/2022 2/22/2022   Little interest or pleasure in doing things 0 0 0 0 0 0   Feeling down, depressed, or hopeless 0 0 0 0 0 1   PHQ-9 Total Score 0 0 0 0 0 1        Abuse Screening: AMB Abuse Screening 4/25/2023   Do you ever feel afraid of your partner? N   Are you in a relationship with someone who physically or mentally threatens you? N   Is it safe for you to go home? Y        Learning Assessment Screening:   No question data found. Fall Risk Screening:   No flowsheet data found. Health Maintenance: reviewed and discussed and ordered per Provider. Health Maintenance Due   Topic Date Due    COVID-19 Vaccine (1) Never done    DTaP/Tdap/Td vaccine (1 - Tdap) Never done    Shingles vaccine (1 of 2) Never done    Pneumococcal 65+ years Vaccine (1 - PCV) Never done         Coordination of Care:   1. \"Have you been to the ER, urgent care clinic since your last visit? Hospitalized since your last visit? \" no    2. \"Have you seen or consulted any other health care providers outside of the [address] [place] [address] since your last visit? \" yes, Urology and Female Pelvic Medicine. 3. For patients aged 39-70: Has the patient had a colonoscopy / FIT/ Cologuard? NA - based on age or sex  If the patient is female:    3. For patients aged 41-77: Has the patient had a mammogram within the past 2 years? NA - based on age or sex    11. For patients aged 21-65: Has the patient had a pap smear? NA - based on age or sex    Advanced Directive:  1.  Do you have

## 2023-05-25 ENCOUNTER — OFFICE VISIT (OUTPATIENT)
Facility: CLINIC | Age: 78
End: 2023-05-25
Payer: MEDICARE

## 2023-05-25 VITALS
WEIGHT: 136 LBS | SYSTOLIC BLOOD PRESSURE: 131 MMHG | TEMPERATURE: 98.4 F | HEART RATE: 68 BPM | DIASTOLIC BLOOD PRESSURE: 80 MMHG | BODY MASS INDEX: 22.66 KG/M2 | RESPIRATION RATE: 16 BRPM | HEIGHT: 65 IN | OXYGEN SATURATION: 97 %

## 2023-05-25 DIAGNOSIS — R31.21 ASYMPTOMATIC MICROSCOPIC HEMATURIA: ICD-10-CM

## 2023-05-25 DIAGNOSIS — Z46.89 PESSARY MAINTENANCE: Primary | ICD-10-CM

## 2023-05-25 DIAGNOSIS — M25.512 ACUTE PAIN OF LEFT SHOULDER: ICD-10-CM

## 2023-05-25 LAB
BILIRUBIN, URINE, POC: NEGATIVE
BLOOD URINE, POC: ABNORMAL
GLUCOSE URINE, POC: NEGATIVE
KETONES, URINE, POC: NEGATIVE
LEUKOCYTE ESTERASE, URINE, POC: ABNORMAL
NITRITE, URINE, POC: NEGATIVE
PH, URINE, POC: 6.5 (ref 4.6–8)
PROTEIN,URINE, POC: ABNORMAL
SPECIFIC GRAVITY, URINE, POC: 1.02 (ref 1–1.03)
URINALYSIS CLARITY, POC: ABNORMAL
URINALYSIS COLOR, POC: YELLOW
UROBILINOGEN, POC: ABNORMAL

## 2023-05-25 PROCEDURE — G8420 CALC BMI NORM PARAMETERS: HCPCS | Performed by: STUDENT IN AN ORGANIZED HEALTH CARE EDUCATION/TRAINING PROGRAM

## 2023-05-25 PROCEDURE — G8427 DOCREV CUR MEDS BY ELIG CLIN: HCPCS | Performed by: STUDENT IN AN ORGANIZED HEALTH CARE EDUCATION/TRAINING PROGRAM

## 2023-05-25 PROCEDURE — 1036F TOBACCO NON-USER: CPT | Performed by: STUDENT IN AN ORGANIZED HEALTH CARE EDUCATION/TRAINING PROGRAM

## 2023-05-25 PROCEDURE — 1123F ACP DISCUSS/DSCN MKR DOCD: CPT | Performed by: STUDENT IN AN ORGANIZED HEALTH CARE EDUCATION/TRAINING PROGRAM

## 2023-05-25 PROCEDURE — 99214 OFFICE O/P EST MOD 30 MIN: CPT | Performed by: STUDENT IN AN ORGANIZED HEALTH CARE EDUCATION/TRAINING PROGRAM

## 2023-05-25 PROCEDURE — G8399 PT W/DXA RESULTS DOCUMENT: HCPCS | Performed by: STUDENT IN AN ORGANIZED HEALTH CARE EDUCATION/TRAINING PROGRAM

## 2023-05-25 PROCEDURE — 1090F PRES/ABSN URINE INCON ASSESS: CPT | Performed by: STUDENT IN AN ORGANIZED HEALTH CARE EDUCATION/TRAINING PROGRAM

## 2023-05-25 PROCEDURE — 81003 URINALYSIS AUTO W/O SCOPE: CPT | Performed by: STUDENT IN AN ORGANIZED HEALTH CARE EDUCATION/TRAINING PROGRAM

## 2023-05-25 ASSESSMENT — PATIENT HEALTH QUESTIONNAIRE - PHQ9
10. IF YOU CHECKED OFF ANY PROBLEMS, HOW DIFFICULT HAVE THESE PROBLEMS MADE IT FOR YOU TO DO YOUR WORK, TAKE CARE OF THINGS AT HOME, OR GET ALONG WITH OTHER PEOPLE: 0
1. LITTLE INTEREST OR PLEASURE IN DOING THINGS: 0
SUM OF ALL RESPONSES TO PHQ QUESTIONS 1-9: 0
5. POOR APPETITE OR OVEREATING: 0
9. THOUGHTS THAT YOU WOULD BE BETTER OFF DEAD, OR OF HURTING YOURSELF: 0
4. FEELING TIRED OR HAVING LITTLE ENERGY: 0
6. FEELING BAD ABOUT YOURSELF - OR THAT YOU ARE A FAILURE OR HAVE LET YOURSELF OR YOUR FAMILY DOWN: 0
7. TROUBLE CONCENTRATING ON THINGS, SUCH AS READING THE NEWSPAPER OR WATCHING TELEVISION: 0
SUM OF ALL RESPONSES TO PHQ9 QUESTIONS 1 & 2: 0
3. TROUBLE FALLING OR STAYING ASLEEP: 0
SUM OF ALL RESPONSES TO PHQ QUESTIONS 1-9: 0
2. FEELING DOWN, DEPRESSED OR HOPELESS: 0
SUM OF ALL RESPONSES TO PHQ QUESTIONS 1-9: 0
SUM OF ALL RESPONSES TO PHQ QUESTIONS 1-9: 0
8. MOVING OR SPEAKING SO SLOWLY THAT OTHER PEOPLE COULD HAVE NOTICED. OR THE OPPOSITE, BEING SO FIGETY OR RESTLESS THAT YOU HAVE BEEN MOVING AROUND A LOT MORE THAN USUAL: 0

## 2023-05-25 NOTE — PROGRESS NOTES
Laura Kaur is a 76 y.o.  female and presents with    Chief Complaint   Patient presents with    Shoulder Pain     5/10 left. Went to Urgent care last week     Urinary Tract Infection     Urine recheck, still urinary frequency            Subjective:    Pt is here to f/up on the UTI she had a couple of weeks ago. She states the sx have improved. She does have hx of recurrent UTIs. She also has a pessary. She does f/up w/ [facility]. Has an appt in 1 month. Today she denies any sx of UTI. Patient is also complaining of left shoulder pain. States that she went to urgent care due to this. She was given a referral for orthopedics to whom she has an upcoming appointment with. Patient does state that she has not let her arm rest, states that she continues to do garden work. Patient Active Problem List   Diagnosis    Senile osteopenia    Uterine prolapse    Cystocele, midline    Hypercholesteremia    Recurrent UTI    Anxiety    Recurrent depression ([address]      Past Medical History:   Diagnosis Date    Allergic rhinitis     Anxiety     Arthropathy     Atrophic vaginitis     Back pain     Chest pain, atypical     COVID-19 01/2022    Cystocele     Depression     Flank pain     Heel pain     HTN (hypertension)     Hypercholesteremia     Hyperlipidemia     Leg pain     Menopause     Metabolic disease     Mitral valve prolapse     with mitral regurgitation    Osteopenia     Recurrent UTI     Uterine prolapse     UTI (urinary tract infection)       Past Surgical History:   Procedure Laterality Date    BREAST BIOPSY Left ? 2010    Benign    CATARACT REMOVAL Right 2014      Family History   Problem Relation Age of Onset    Heart Disease Sister     Heart Disease Brother     Heart Disease Mother     Cancer Father      Social History     Socioeconomic History    Marital status:      Spouse name: Not on file    Number of children: Not on file    Years of education: Not on file    Highest education level:

## 2023-05-25 NOTE — PROGRESS NOTES
Paresh Diane is a 76 y.o. presents today for   Chief Complaint   Patient presents with    Shoulder Pain     5/10 left. Went to Urgent care last week     Urinary Tract Infection     Urine recheck, still urinary frequency      Is someone accompanying this pt? No     Is the patient using any DME equipment during OV? No     Health Maintenance Due   Topic Date Due    COVID-19 Vaccine (1) Never done    DTaP/Tdap/Td vaccine (1 - Tdap) Never done    Shingles vaccine (1 of 2) Never done    Pneumococcal 65+ years Vaccine (1 - PCV) Never done         Coordination of Care:   1. \"Have you been to the ER, urgent care clinic since your last visit? Hospitalized since your last visit? \" Yes patient first last week     2. \"Have you seen or consulted any other health care providers outside of the [address] [place] [address] since your last visit? \" No     3. For patients aged 39-70: Has the patient had a colonoscopy / FIT/ Cologuard? NA - based on age      If the patient is female:    4. For patients aged 41-77: Has the patient had a mammogram within the past 2 years? NA - based on age or sex      11. For patients aged 21-65: Has the patient had a pap smear?  NA - based on age or sex    Malon Bence

## 2023-05-26 ASSESSMENT — ENCOUNTER SYMPTOMS
FACIAL SWELLING: 0
ABDOMINAL PAIN: 0
SHORTNESS OF BREATH: 0
EYE ITCHING: 0
BACK PAIN: 0
COLOR CHANGE: 0
EYE PAIN: 0
CONSTIPATION: 0
VOMITING: 0
EYE DISCHARGE: 0
DIARRHEA: 0
EYE REDNESS: 0

## 2023-06-08 DIAGNOSIS — N30.01 ACUTE CYSTITIS WITH HEMATURIA: Primary | ICD-10-CM

## 2023-06-08 RX ORDER — CEFDINIR 300 MG/1
300 CAPSULE ORAL 2 TIMES DAILY
Qty: 10 CAPSULE | Refills: 0 | Status: SHIPPED | OUTPATIENT
Start: 2023-06-08 | End: 2023-06-13

## 2023-07-05 ENCOUNTER — TELEPHONE (OUTPATIENT)
Facility: CLINIC | Age: 78
End: 2023-07-05

## 2023-07-05 RX ORDER — FLUCONAZOLE 150 MG/1
150 TABLET ORAL ONCE
Qty: 1 TABLET | Refills: 0 | Status: SHIPPED | OUTPATIENT
Start: 2023-07-05 | End: 2023-07-05

## 2023-07-05 NOTE — TELEPHONE ENCOUNTER
Patient called stating she believes she has a yeast infection and would like to know if Dr. Kaylyn South can send a Rx to her pharmacy? Please call patient to advise/confirm. Preferred Pharmacy:  [facility] [address]:  [phone]  F:  [phone]    Thank you.

## 2023-07-24 DIAGNOSIS — F41.3 OTHER MIXED ANXIETY DISORDERS: Primary | ICD-10-CM

## 2023-07-24 DIAGNOSIS — E78.00 PURE HYPERCHOLESTEROLEMIA, UNSPECIFIED: ICD-10-CM

## 2023-07-24 DIAGNOSIS — R31.21 ASYMPTOMATIC MICROSCOPIC HEMATURIA: ICD-10-CM

## 2023-07-25 ENCOUNTER — HOSPITAL ENCOUNTER (OUTPATIENT)
Facility: HOSPITAL | Age: 78
Setting detail: SPECIMEN
Discharge: HOME | End: 2023-07-28
Payer: MEDICARE

## 2023-07-25 DIAGNOSIS — E78.00 PURE HYPERCHOLESTEROLEMIA, UNSPECIFIED: ICD-10-CM

## 2023-07-25 DIAGNOSIS — R31.21 ASYMPTOMATIC MICROSCOPIC HEMATURIA: ICD-10-CM

## 2023-07-25 DIAGNOSIS — F41.3 OTHER MIXED ANXIETY DISORDERS: ICD-10-CM

## 2023-07-25 LAB
ALBUMIN SERPL-MCNC: 3.4 G/DL (ref 3.4–5)
ALBUMIN/GLOB SERPL: 0.9 (ref 0.8–1.7)
ALP SERPL-CCNC: 71 U/L (ref 45–117)
ALT SERPL-CCNC: 22 U/L (ref 13–56)
ANION GAP SERPL CALC-SCNC: 5 MMOL/L (ref 3–18)
APPEARANCE UR: CLEAR
AST SERPL-CCNC: 16 U/L (ref 10–38)
BACTERIA URNS QL MICRO: ABNORMAL /HPF
BILIRUB SERPL-MCNC: 1.3 MG/DL (ref 0.2–1)
BILIRUB UR QL: NEGATIVE
BUN SERPL-MCNC: 14 MG/DL (ref 7–18)
BUN/CREAT SERPL: 20 (ref 12–20)
CALCIUM SERPL-MCNC: 8.8 MG/DL (ref 8.5–10.1)
CHLORIDE SERPL-SCNC: 109 MMOL/L (ref 100–111)
CHOLEST SERPL-MCNC: 199 MG/DL
CO2 SERPL-SCNC: 29 MMOL/L (ref 21–32)
COLOR UR: YELLOW
CREAT SERPL-MCNC: 0.7 MG/DL (ref 0.6–1.3)
EPITH CASTS URNS QL MICRO: ABNORMAL /LPF (ref 0–5)
ERYTHROCYTE [DISTWIDTH] IN BLOOD BY AUTOMATED COUNT: 13 % (ref 11.6–14.5)
GLOBULIN SER CALC-MCNC: 3.6 G/DL (ref 2–4)
GLUCOSE SERPL-MCNC: 86 MG/DL (ref 74–99)
GLUCOSE UR STRIP.AUTO-MCNC: NEGATIVE MG/DL
HCT VFR BLD AUTO: 41.9 % (ref 35–45)
HDLC SERPL-MCNC: 63 MG/DL (ref 40–60)
HDLC SERPL: 3.2 (ref 0–5)
HGB BLD-MCNC: 13.3 G/DL (ref 12–16)
HGB UR QL STRIP: ABNORMAL
KETONES UR QL STRIP.AUTO: NEGATIVE MG/DL
LDLC SERPL CALC-MCNC: 111.8 MG/DL (ref 0–100)
LEUKOCYTE ESTERASE UR QL STRIP.AUTO: ABNORMAL
LIPID PANEL: ABNORMAL
MCH RBC QN AUTO: 29.9 PG (ref 24–34)
MCHC RBC AUTO-ENTMCNC: 31.7 G/DL (ref 31–37)
MCV RBC AUTO: 94.2 FL (ref 78–100)
NITRITE UR QL STRIP.AUTO: NEGATIVE
NRBC # BLD: 0 K/UL (ref 0–0.01)
NRBC BLD-RTO: 0 PER 100 WBC
PH UR STRIP: 6 (ref 5–8)
PLATELET # BLD AUTO: 180 K/UL (ref 135–420)
PMV BLD AUTO: 10.9 FL (ref 9.2–11.8)
POTASSIUM SERPL-SCNC: 4.2 MMOL/L (ref 3.5–5.5)
PROT SERPL-MCNC: 7 G/DL (ref 6.4–8.2)
PROT UR STRIP-MCNC: 30 MG/DL
RBC # BLD AUTO: 4.45 M/UL (ref 4.2–5.3)
RBC #/AREA URNS HPF: ABNORMAL /HPF (ref 0–5)
SODIUM SERPL-SCNC: 143 MMOL/L (ref 136–145)
SP GR UR REFRACTOMETRY: 1.02 (ref 1–1.03)
TRIGL SERPL-MCNC: 121 MG/DL
TSH SERPL DL<=0.05 MIU/L-ACNC: 0.72 UIU/ML (ref 0.36–3.74)
UROBILINOGEN UR QL STRIP.AUTO: 0.2 EU/DL (ref 0.2–1)
VLDLC SERPL CALC-MCNC: 24.2 MG/DL
WBC # BLD AUTO: 4.9 K/UL (ref 4.6–13.2)
WBC URNS QL MICRO: ABNORMAL /HPF (ref 0–4)

## 2023-07-25 PROCEDURE — 80053 COMPREHEN METABOLIC PANEL: CPT

## 2023-07-25 PROCEDURE — 85027 COMPLETE CBC AUTOMATED: CPT

## 2023-07-25 PROCEDURE — 84443 ASSAY THYROID STIM HORMONE: CPT

## 2023-07-25 PROCEDURE — 80061 LIPID PANEL: CPT

## 2023-07-25 PROCEDURE — 81001 URINALYSIS AUTO W/SCOPE: CPT

## 2023-07-25 PROCEDURE — 36415 COLL VENOUS BLD VENIPUNCTURE: CPT

## 2023-08-17 ENCOUNTER — OFFICE VISIT (OUTPATIENT)
Facility: CLINIC | Age: 78
End: 2023-08-17
Payer: MEDICARE

## 2023-08-17 VITALS
BODY MASS INDEX: 22.82 KG/M2 | RESPIRATION RATE: 17 BRPM | HEART RATE: 80 BPM | OXYGEN SATURATION: 93 % | HEIGHT: 65 IN | WEIGHT: 137 LBS | TEMPERATURE: 97 F | DIASTOLIC BLOOD PRESSURE: 78 MMHG | SYSTOLIC BLOOD PRESSURE: 128 MMHG

## 2023-08-17 DIAGNOSIS — F41.3 OTHER MIXED ANXIETY DISORDERS: ICD-10-CM

## 2023-08-17 DIAGNOSIS — Z00.00 MEDICARE ANNUAL WELLNESS VISIT, SUBSEQUENT: Primary | ICD-10-CM

## 2023-08-17 PROCEDURE — G8427 DOCREV CUR MEDS BY ELIG CLIN: HCPCS | Performed by: STUDENT IN AN ORGANIZED HEALTH CARE EDUCATION/TRAINING PROGRAM

## 2023-08-17 PROCEDURE — G8399 PT W/DXA RESULTS DOCUMENT: HCPCS | Performed by: STUDENT IN AN ORGANIZED HEALTH CARE EDUCATION/TRAINING PROGRAM

## 2023-08-17 PROCEDURE — 1090F PRES/ABSN URINE INCON ASSESS: CPT | Performed by: STUDENT IN AN ORGANIZED HEALTH CARE EDUCATION/TRAINING PROGRAM

## 2023-08-17 PROCEDURE — 1123F ACP DISCUSS/DSCN MKR DOCD: CPT | Performed by: STUDENT IN AN ORGANIZED HEALTH CARE EDUCATION/TRAINING PROGRAM

## 2023-08-17 PROCEDURE — G8420 CALC BMI NORM PARAMETERS: HCPCS | Performed by: STUDENT IN AN ORGANIZED HEALTH CARE EDUCATION/TRAINING PROGRAM

## 2023-08-17 PROCEDURE — G0439 PPPS, SUBSEQ VISIT: HCPCS | Performed by: STUDENT IN AN ORGANIZED HEALTH CARE EDUCATION/TRAINING PROGRAM

## 2023-08-17 PROCEDURE — 99214 OFFICE O/P EST MOD 30 MIN: CPT | Performed by: STUDENT IN AN ORGANIZED HEALTH CARE EDUCATION/TRAINING PROGRAM

## 2023-08-17 PROCEDURE — 1036F TOBACCO NON-USER: CPT | Performed by: STUDENT IN AN ORGANIZED HEALTH CARE EDUCATION/TRAINING PROGRAM

## 2023-08-17 RX ORDER — SERTRALINE HYDROCHLORIDE 100 MG/1
100 TABLET, FILM COATED ORAL DAILY
Qty: 90 TABLET | Refills: 1 | Status: SHIPPED | OUTPATIENT
Start: 2023-08-17

## 2023-08-17 ASSESSMENT — ENCOUNTER SYMPTOMS
BACK PAIN: 0
COLOR CHANGE: 0
EYE REDNESS: 0
VOMITING: 0
ABDOMINAL PAIN: 0
CONSTIPATION: 0
DIARRHEA: 0
FACIAL SWELLING: 0
SHORTNESS OF BREATH: 0
EYE PAIN: 0
EYE ITCHING: 0
EYE DISCHARGE: 0

## 2023-08-17 ASSESSMENT — PATIENT HEALTH QUESTIONNAIRE - PHQ9
7. TROUBLE CONCENTRATING ON THINGS, SUCH AS READING THE NEWSPAPER OR WATCHING TELEVISION: 0
SUM OF ALL RESPONSES TO PHQ QUESTIONS 1-9: 0
SUM OF ALL RESPONSES TO PHQ9 QUESTIONS 1 & 2: 0
5. POOR APPETITE OR OVEREATING: 0
4. FEELING TIRED OR HAVING LITTLE ENERGY: 0
SUM OF ALL RESPONSES TO PHQ QUESTIONS 1-9: 0
6. FEELING BAD ABOUT YOURSELF - OR THAT YOU ARE A FAILURE OR HAVE LET YOURSELF OR YOUR FAMILY DOWN: 0
8. MOVING OR SPEAKING SO SLOWLY THAT OTHER PEOPLE COULD HAVE NOTICED. OR THE OPPOSITE, BEING SO FIGETY OR RESTLESS THAT YOU HAVE BEEN MOVING AROUND A LOT MORE THAN USUAL: 0
1. LITTLE INTEREST OR PLEASURE IN DOING THINGS: 0
9. THOUGHTS THAT YOU WOULD BE BETTER OFF DEAD, OR OF HURTING YOURSELF: 0
2. FEELING DOWN, DEPRESSED OR HOPELESS: 0
3. TROUBLE FALLING OR STAYING ASLEEP: 0

## 2023-08-17 ASSESSMENT — LIFESTYLE VARIABLES
HOW MANY STANDARD DRINKS CONTAINING ALCOHOL DO YOU HAVE ON A TYPICAL DAY: PATIENT DOES NOT DRINK
HOW OFTEN DO YOU HAVE A DRINK CONTAINING ALCOHOL: NEVER

## 2023-08-17 NOTE — PROGRESS NOTES
Seema Crawley is a 78 y.o. presents today for   Chief Complaint   Patient presents with    Follow-up     Patient here for follow up on anxiety        Is someone accompanying this pt? no    Is the patient using any DME equipment during OV? no    Depression Screening:   PHQ-9 Questionaire 5/25/2023 4/25/2023 1/24/2023 11/29/2022 8/22/2022 6/6/2022 2/22/2022   Little interest or pleasure in doing things 0 0 0 0 0 0 0   Feeling down, depressed, or hopeless 0 0 0 0 0 0 1   Trouble falling or staying asleep, or sleeping too much 0 - - - - - -   Feeling tired or having little energy 0 - - - - - -   Poor appetite or overeating 0 - - - - - -   Feeling bad about yourself - or that you are a failure or have let yourself or your family down 0 - - - - - -   Trouble concentrating on things, such as reading the newspaper or watching television 0 - - - - - -   Moving or speaking so slowly that other people could have noticed. Or the opposite - being so fidgety or restless that you have been moving around a lot more than usual 0 - - - - - -   Thoughts that you would be better off dead, or of hurting yourself in some way 0 - - - - - -   PHQ-9 Total Score 0 0 0 0 0 0 1   If you checked off any problems, how difficult have these problems made it for you to do your work, take care of things at home, or get along with other people? 0 - - - - - -       Abuse Screening: AMB Abuse Screening 4/25/2023   Do you ever feel afraid of your partner? N   Are you in a relationship with someone who physically or mentally threatens you? N   Is it safe for you to go home? Y       Learning Assessment:  No question data found. Fall Risk:  Fall Risk 5/25/2023   2 or more falls in past year? no   Fall with injury in past year? no           Coordination of Care:   1. \"Have you been to the ER, urgent care clinic since your last visit? Hospitalized since your last visit? \" no    2.  \"Have you seen or consulted any other health care providers outside of the
Ran Out of Food in the Last Year: Never true   Transportation Needs: Unknown    Lack of Transportation (Medical): Not on file    Lack of Transportation (Non-Medical): No   Physical Activity: Insufficiently Active    Days of Exercise per Week: 4 days    Minutes of Exercise per Session: 20 min   Stress: Not on file   Social Connections: Not on file   Intimate Partner Violence: Not on file   Housing Stability: Unknown    Unable to Pay for Housing in the Last Year: Not on file    Number of Places Lived in the Last Year: Not on file    Unstable Housing in the Last Year: No        Current Outpatient Medications   Medication Sig Dispense Refill    ALPRAZolam (XANAX) 0.25 MG tablet TAKE 1 TABLET BY MOUTH EVERY DAY AS NEEDED FOR ANXIETY      Cholecalciferol 50 MCG (2000 UT) CAPS Take by mouth      cyanocobalamin 1000 MCG tablet Take by mouth      diclofenac sodium (VOLTAREN) 1 % GEL Apply 2 g topically 4 times daily      estradiol (ESTRACE) 0.1 MG/GM vaginal cream Apply 1g vaginally three times a week, apply with fingertip - do not use applicator. fluticasone (FLONASE) 50 MCG/ACT nasal spray 2 sprays by Nasal route daily      loratadine (CLARITIN) 10 MG tablet Take by mouth      sertraline (ZOLOFT) 100 MG tablet Take 1 tablet by mouth daily       No current facility-administered medications for this visit. ROS   Review of Systems   Constitutional:  Negative for activity change and appetite change. HENT:  Negative for congestion, drooling, ear discharge, ear pain and facial swelling. Eyes:  Negative for pain, discharge, redness and itching. Respiratory:  Negative for shortness of breath. Cardiovascular:  Negative for leg swelling. Gastrointestinal:  Negative for abdominal pain, constipation, diarrhea and vomiting. Genitourinary:  Negative for difficulty urinating, frequency, hematuria and urgency. Musculoskeletal:  Negative for arthralgias, back pain, myalgias and neck pain.    Skin:  Negative

## 2023-09-13 NOTE — TELEPHONE ENCOUNTER
This patient contacted the office for the following prescriptions to be refilled:    Medication requested :   Requested Prescriptions     Pending Prescriptions Disp Refills    fluticasone (FLONASE) 50 MCG/ACT nasal spray 16 g      Si sprays by Nasal route daily      PCP: MD KADE Torre DMA: 2024  FUTURE APPT:   Future Appointments   Date Time Provider Department Center   2024 11:40 AM Ra Bae MD [facility]   2024 11:20 AM HUDSON Frances NP         Thank you.

## 2023-09-14 RX ORDER — FLUTICASONE PROPIONATE 50 MCG
2 SPRAY, SUSPENSION (ML) NASAL DAILY
Qty: 16 G | Refills: 2 | Status: SHIPPED | OUTPATIENT
Start: 2023-09-14

## 2023-10-31 ENCOUNTER — HOSPITAL ENCOUNTER (OUTPATIENT)
Facility: HOSPITAL | Age: 78
Setting detail: SPECIMEN
Discharge: HOME | End: 2023-11-03
Payer: MEDICARE

## 2023-10-31 ENCOUNTER — OFFICE VISIT (OUTPATIENT)
Facility: CLINIC | Age: 78
End: 2023-10-31
Payer: MEDICARE

## 2023-10-31 VITALS
HEART RATE: 66 BPM | DIASTOLIC BLOOD PRESSURE: 81 MMHG | RESPIRATION RATE: 16 BRPM | WEIGHT: 133 LBS | HEIGHT: 65 IN | TEMPERATURE: 97.6 F | BODY MASS INDEX: 22.16 KG/M2 | SYSTOLIC BLOOD PRESSURE: 146 MMHG | OXYGEN SATURATION: 97 %

## 2023-10-31 DIAGNOSIS — R35.0 URINARY FREQUENCY: ICD-10-CM

## 2023-10-31 DIAGNOSIS — Z81.8 FAMILY HISTORY OF STRESS: ICD-10-CM

## 2023-10-31 DIAGNOSIS — N39.0 RECURRENT UTI: ICD-10-CM

## 2023-10-31 DIAGNOSIS — F33.9 RECURRENT DEPRESSION (HCC): ICD-10-CM

## 2023-10-31 DIAGNOSIS — Z28.21 23-POLYVALENT PNEUMOCOCCAL POLYSACCHARIDE VACCINE DECLINED: ICD-10-CM

## 2023-10-31 DIAGNOSIS — L85.3 DRY SKIN: Primary | ICD-10-CM

## 2023-10-31 DIAGNOSIS — Z28.21 INFLUENZA VACCINATION DECLINED: ICD-10-CM

## 2023-10-31 DIAGNOSIS — I10 HTN, GOAL BELOW 140/90: ICD-10-CM

## 2023-10-31 DIAGNOSIS — B96.20 E. COLI UTI: ICD-10-CM

## 2023-10-31 DIAGNOSIS — L29.9 ITCH OF SKIN: ICD-10-CM

## 2023-10-31 DIAGNOSIS — N39.0 E. COLI UTI: ICD-10-CM

## 2023-10-31 LAB
BILIRUBIN, URINE, POC: NEGATIVE
BLOOD URINE, POC: NORMAL
GLUCOSE URINE, POC: NEGATIVE
KETONES, URINE, POC: NEGATIVE
LEUKOCYTE ESTERASE, URINE, POC: NORMAL
NITRITE, URINE, POC: POSITIVE
PH, URINE, POC: 6 (ref 4.6–8)
PROTEIN,URINE, POC: NORMAL
SPECIFIC GRAVITY, URINE, POC: 1.03 (ref 1–1.03)
URINALYSIS CLARITY, POC: NORMAL
URINALYSIS COLOR, POC: NORMAL
UROBILINOGEN, POC: NORMAL

## 2023-10-31 PROCEDURE — G8420 CALC BMI NORM PARAMETERS: HCPCS | Performed by: NURSE PRACTITIONER

## 2023-10-31 PROCEDURE — 1090F PRES/ABSN URINE INCON ASSESS: CPT | Performed by: NURSE PRACTITIONER

## 2023-10-31 PROCEDURE — 1036F TOBACCO NON-USER: CPT | Performed by: NURSE PRACTITIONER

## 2023-10-31 PROCEDURE — 3079F DIAST BP 80-89 MM HG: CPT | Performed by: NURSE PRACTITIONER

## 2023-10-31 PROCEDURE — G8484 FLU IMMUNIZE NO ADMIN: HCPCS | Performed by: NURSE PRACTITIONER

## 2023-10-31 PROCEDURE — 3077F SYST BP >= 140 MM HG: CPT | Performed by: NURSE PRACTITIONER

## 2023-10-31 PROCEDURE — G8428 CUR MEDS NOT DOCUMENT: HCPCS | Performed by: NURSE PRACTITIONER

## 2023-10-31 PROCEDURE — 99214 OFFICE O/P EST MOD 30 MIN: CPT | Performed by: NURSE PRACTITIONER

## 2023-10-31 PROCEDURE — 87077 CULTURE AEROBIC IDENTIFY: CPT

## 2023-10-31 PROCEDURE — 87186 SC STD MICRODIL/AGAR DIL: CPT

## 2023-10-31 PROCEDURE — 1123F ACP DISCUSS/DSCN MKR DOCD: CPT | Performed by: NURSE PRACTITIONER

## 2023-10-31 PROCEDURE — 81001 URINALYSIS AUTO W/SCOPE: CPT | Performed by: NURSE PRACTITIONER

## 2023-10-31 PROCEDURE — G8399 PT W/DXA RESULTS DOCUMENT: HCPCS | Performed by: NURSE PRACTITIONER

## 2023-10-31 PROCEDURE — 87086 URINE CULTURE/COLONY COUNT: CPT

## 2023-10-31 RX ORDER — CERAMIDE 1,3,6-II/SALICYLIC/B3
453 CLEANSER (ML) TOPICAL DAILY PRN
Qty: 453 G | Refills: 4 | Status: SHIPPED | OUTPATIENT
Start: 2023-10-31

## 2023-10-31 RX ORDER — CEFDINIR 300 MG/1
300 CAPSULE ORAL 2 TIMES DAILY
Qty: 10 CAPSULE | Refills: 0 | Status: SHIPPED | OUTPATIENT
Start: 2023-10-31 | End: 2023-11-05

## 2023-10-31 RX ORDER — DIMETHICONE/COLLOIDAL OATMEAL 1.25 %
1 LOTION (ML) TOPICAL DAILY PRN
Qty: 8 PACKET | Refills: 5 | Status: SHIPPED | OUTPATIENT
Start: 2023-10-31

## 2023-10-31 NOTE — PROGRESS NOTES
Bruno Carvalho is a 78 y.o. female  established patient, here for evaluation of the following chief complaint(s):  Chief Complaint   Patient presents with    Rash     Itching     Urinary Frequency      Assessment and Plan  1. Dry skin  -     Emollient (CERAVE MOISTURIZING) CREA; Apply 453 g topically daily as needed (dry skin), Disp-453 g, R-4Normal  -     oatmeal bath (AVEENO SOOTHING BATH TREATMENT) packet; Apply 1 packet topically daily as needed for Irritation, Disp-8 packet, R-5Normal  2. Urinary frequency  -     AMB POC URINALYSIS DIP STICK AUTO W/ MICRO  -     Culture, Urine; Future  -     cefdinir (OMNICEF) 300 MG capsule; Take 1 capsule by mouth 2 times daily for 5 days, Disp-10 capsule, R-0Normal  3. Recurrent depression ([address])  4. Family history of stress  5. Itch of skin  -     Emollient (CERAVE MOISTURIZING) CREA; Apply 453 g topically daily as needed (dry skin), Disp-453 g, R-4Normal  -     oatmeal bath (AVEENO SOOTHING BATH TREATMENT) packet; Apply 1 packet topically daily as needed for Irritation, Disp-8 packet, R-5Normal  6. 23-polyvalent pneumococcal polysaccharide vaccine declined  7. Influenza vaccination declined  8. Recurrent UTI  -     AMB POC URINALYSIS DIP STICK AUTO W/ MICRO  -     Culture, Urine; Future  9. HTN, goal below 140/90  10. E. coli UTI  -     cefdinir (OMNICEF) 300 MG capsule; Take 1 capsule by mouth 2 times daily for 5 days, Disp-10 capsule, R-0Normal     Return if symptoms worsen or fail to improve, for Follow-up with your PCP. HPI:   In office visit. Pt appears well. Pt is going through a lot of stress. Pt has a daughter w/ mental health issues living at home and stressful. Pt's  is upset daughter has not got her own place yet. Pt c/o urine frequency for 1 week. UA today nitrite positive   Pt c/o skin itching like something is crawling on her. Pt has dry thin skin. Advised oatmeal bath and a good lotion. She admits she doesn't drink much water.      BP elevated in

## 2023-11-03 LAB
BACTERIA SPEC CULT: ABNORMAL
CC UR VC: ABNORMAL
SERVICE CMNT-IMP: ABNORMAL

## 2023-11-06 DIAGNOSIS — A49.9 BACTERIAL UTI: Primary | ICD-10-CM

## 2023-11-06 DIAGNOSIS — N39.0 BACTERIAL UTI: Primary | ICD-10-CM

## 2023-11-06 RX ORDER — CIPROFLOXACIN 250 MG/1
250 TABLET, FILM COATED ORAL 2 TIMES DAILY
Qty: 6 TABLET | Refills: 0 | Status: SHIPPED | OUTPATIENT
Start: 2023-11-06 | End: 2023-11-09

## 2023-11-07 ENCOUNTER — OFFICE VISIT (OUTPATIENT)
Facility: CLINIC | Age: 78
End: 2023-11-07
Payer: MEDICARE

## 2023-11-07 VITALS
HEIGHT: 60 IN | OXYGEN SATURATION: 99 % | RESPIRATION RATE: 16 BRPM | WEIGHT: 133 LBS | HEART RATE: 71 BPM | BODY MASS INDEX: 26.11 KG/M2 | SYSTOLIC BLOOD PRESSURE: 159 MMHG | TEMPERATURE: 98.3 F | DIASTOLIC BLOOD PRESSURE: 79 MMHG

## 2023-11-07 DIAGNOSIS — I10 ESSENTIAL HYPERTENSION: Primary | ICD-10-CM

## 2023-11-07 PROCEDURE — G8419 CALC BMI OUT NRM PARAM NOF/U: HCPCS | Performed by: STUDENT IN AN ORGANIZED HEALTH CARE EDUCATION/TRAINING PROGRAM

## 2023-11-07 PROCEDURE — G8427 DOCREV CUR MEDS BY ELIG CLIN: HCPCS | Performed by: STUDENT IN AN ORGANIZED HEALTH CARE EDUCATION/TRAINING PROGRAM

## 2023-11-07 PROCEDURE — 99214 OFFICE O/P EST MOD 30 MIN: CPT | Performed by: STUDENT IN AN ORGANIZED HEALTH CARE EDUCATION/TRAINING PROGRAM

## 2023-11-07 PROCEDURE — 1123F ACP DISCUSS/DSCN MKR DOCD: CPT | Performed by: STUDENT IN AN ORGANIZED HEALTH CARE EDUCATION/TRAINING PROGRAM

## 2023-11-07 PROCEDURE — 1036F TOBACCO NON-USER: CPT | Performed by: STUDENT IN AN ORGANIZED HEALTH CARE EDUCATION/TRAINING PROGRAM

## 2023-11-07 PROCEDURE — 1090F PRES/ABSN URINE INCON ASSESS: CPT | Performed by: STUDENT IN AN ORGANIZED HEALTH CARE EDUCATION/TRAINING PROGRAM

## 2023-11-07 PROCEDURE — 3074F SYST BP LT 130 MM HG: CPT | Performed by: STUDENT IN AN ORGANIZED HEALTH CARE EDUCATION/TRAINING PROGRAM

## 2023-11-07 PROCEDURE — G8399 PT W/DXA RESULTS DOCUMENT: HCPCS | Performed by: STUDENT IN AN ORGANIZED HEALTH CARE EDUCATION/TRAINING PROGRAM

## 2023-11-07 PROCEDURE — 3078F DIAST BP <80 MM HG: CPT | Performed by: STUDENT IN AN ORGANIZED HEALTH CARE EDUCATION/TRAINING PROGRAM

## 2023-11-07 PROCEDURE — G8484 FLU IMMUNIZE NO ADMIN: HCPCS | Performed by: STUDENT IN AN ORGANIZED HEALTH CARE EDUCATION/TRAINING PROGRAM

## 2023-11-07 RX ORDER — AMLODIPINE BESYLATE 2.5 MG/1
2.5 TABLET ORAL DAILY
Qty: 30 TABLET | Refills: 5 | Status: SHIPPED | OUTPATIENT
Start: 2023-11-07

## 2023-11-07 ASSESSMENT — PATIENT HEALTH QUESTIONNAIRE - PHQ9
5. POOR APPETITE OR OVEREATING: 0
SUM OF ALL RESPONSES TO PHQ9 QUESTIONS 1 & 2: 0
3. TROUBLE FALLING OR STAYING ASLEEP: 0
7. TROUBLE CONCENTRATING ON THINGS, SUCH AS READING THE NEWSPAPER OR WATCHING TELEVISION: 0
9. THOUGHTS THAT YOU WOULD BE BETTER OFF DEAD, OR OF HURTING YOURSELF: 0
4. FEELING TIRED OR HAVING LITTLE ENERGY: 0
2. FEELING DOWN, DEPRESSED OR HOPELESS: 0
8. MOVING OR SPEAKING SO SLOWLY THAT OTHER PEOPLE COULD HAVE NOTICED. OR THE OPPOSITE, BEING SO FIGETY OR RESTLESS THAT YOU HAVE BEEN MOVING AROUND A LOT MORE THAN USUAL: 0
SUM OF ALL RESPONSES TO PHQ QUESTIONS 1-9: 0
6. FEELING BAD ABOUT YOURSELF - OR THAT YOU ARE A FAILURE OR HAVE LET YOURSELF OR YOUR FAMILY DOWN: 0
1. LITTLE INTEREST OR PLEASURE IN DOING THINGS: 0
SUM OF ALL RESPONSES TO PHQ QUESTIONS 1-9: 0
10. IF YOU CHECKED OFF ANY PROBLEMS, HOW DIFFICULT HAVE THESE PROBLEMS MADE IT FOR YOU TO DO YOUR WORK, TAKE CARE OF THINGS AT HOME, OR GET ALONG WITH OTHER PEOPLE: 0
SUM OF ALL RESPONSES TO PHQ QUESTIONS 1-9: 0
SUM OF ALL RESPONSES TO PHQ QUESTIONS 1-9: 0

## 2023-11-07 NOTE — PROGRESS NOTES
Gavino Yi is a 78 y.o. presents today for   Chief Complaint   Patient presents with    Follow-up     ED /82      Is someone accompanying this pt? No      Is the patient using any DME equipment during OV? No     Health Maintenance Due   Topic Date Due    COVID-19 Vaccine (1) Never done    DTaP/Tdap/Td vaccine (1 - Tdap) Never done    Shingles vaccine (1 of 2) Never done         Coordination of Care:   1. \"Have you been to the ER, urgent care clinic since your last visit? Hospitalized since your last visit? \" Yes ED high blood pressure     2. \"Have you seen or consulted any other health care providers outside of the [address] [place] [address] since your last visit? \" No     3. For patients aged 43-73: Has the patient had a colonoscopy / FIT/ Cologuard? NA - based on age      If the patient is female:    4. For patients aged 43-66: Has the patient had a mammogram within the past 2 years? NA - based on age or sex      11. For patients aged 21-65: Has the patient had a pap smear?  NA - based on age or sex    Denis Larkin

## 2023-11-07 NOTE — PROGRESS NOTES
Bruno Carvalho is a 78 y.o.  female and presents with    Chief Complaint   Patient presents with    Follow-up     ED /82            Subjective:    Pt has been having elevated blood pressures. She even went to the emergency room for this. At this time she is not taking any medications for her blood pressures. She does have significant family history of hypertension, and states that she is the only one of her family that is not taking blood pressure medicines at this time. Patient states that she does not have any significant issues at this time other than her blood pressure. Patient Active Problem List   Diagnosis    Senile osteopenia    Uterine prolapse    Cystocele, midline    Hypercholesteremia    Recurrent UTI    Anxiety    Recurrent depression ([address])      Past Medical History:   Diagnosis Date    Allergic rhinitis     Anxiety     Arthropathy     Atrophic vaginitis     Back pain     Chest pain, atypical     COVID-19 01/2022    Cystocele     Depression     Flank pain     Heel pain     HTN (hypertension)     Hypercholesteremia     Hyperlipidemia     Leg pain     Menopause     Metabolic disease     Mitral valve prolapse     with mitral regurgitation    Osteopenia     Recurrent UTI     Uterine prolapse     UTI (urinary tract infection)       Past Surgical History:   Procedure Laterality Date    BREAST BIOPSY Left ? 2010    Benign    CATARACT REMOVAL Right 2014      Family History   Problem Relation Age of Onset    Heart Disease Sister     Heart Disease Brother     Heart Disease Mother     Cancer Father      Social History     Socioeconomic History    Marital status:      Spouse name: Not on file    Number of children: Not on file    Years of education: Not on file    Highest education level: Not on file   Occupational History    Not on file   Tobacco Use    Smoking status: Never    Smokeless tobacco: Never   Vaping Use    Vaping Use: Never used   Substance and Sexual Activity    Alcohol use:

## 2023-11-09 NOTE — PROGRESS NOTES
I called pt but there was no answer. I left her a VM stating that pinky sent in cipro for her antibiotics.

## 2023-11-10 ASSESSMENT — ENCOUNTER SYMPTOMS
EYE ITCHING: 0
VOMITING: 0
SHORTNESS OF BREATH: 0
FACIAL SWELLING: 0
CONSTIPATION: 0
BACK PAIN: 0
COLOR CHANGE: 0
ABDOMINAL PAIN: 0
EYE DISCHARGE: 0
EYE PAIN: 0
DIARRHEA: 0
EYE REDNESS: 0

## 2023-12-08 ENCOUNTER — OFFICE VISIT (OUTPATIENT)
Facility: CLINIC | Age: 78
End: 2023-12-08
Payer: MEDICARE

## 2023-12-08 VITALS
WEIGHT: 132.8 LBS | HEIGHT: 60 IN | RESPIRATION RATE: 16 BRPM | DIASTOLIC BLOOD PRESSURE: 76 MMHG | BODY MASS INDEX: 26.07 KG/M2 | OXYGEN SATURATION: 99 % | SYSTOLIC BLOOD PRESSURE: 139 MMHG | TEMPERATURE: 97.9 F | HEART RATE: 67 BPM

## 2023-12-08 DIAGNOSIS — I10 ESSENTIAL HYPERTENSION: ICD-10-CM

## 2023-12-08 DIAGNOSIS — R35.0 URINARY FREQUENCY: Primary | ICD-10-CM

## 2023-12-08 DIAGNOSIS — N39.0 RECURRENT UTI: ICD-10-CM

## 2023-12-08 LAB
BILIRUBIN, URINE, POC: NEGATIVE
BLOOD URINE, POC: NORMAL
GLUCOSE URINE, POC: NEGATIVE
KETONES, URINE, POC: NEGATIVE
LEUKOCYTE ESTERASE, URINE, POC: NORMAL
NITRITE, URINE, POC: NEGATIVE
PH, URINE, POC: 6 (ref 4.6–8)
PROTEIN,URINE, POC: NORMAL
SPECIFIC GRAVITY, URINE, POC: 1.02 (ref 1–1.03)
URINALYSIS CLARITY, POC: NORMAL
URINALYSIS COLOR, POC: YELLOW
UROBILINOGEN, POC: NORMAL

## 2023-12-08 PROCEDURE — 1123F ACP DISCUSS/DSCN MKR DOCD: CPT | Performed by: STUDENT IN AN ORGANIZED HEALTH CARE EDUCATION/TRAINING PROGRAM

## 2023-12-08 PROCEDURE — 3078F DIAST BP <80 MM HG: CPT | Performed by: STUDENT IN AN ORGANIZED HEALTH CARE EDUCATION/TRAINING PROGRAM

## 2023-12-08 PROCEDURE — 99214 OFFICE O/P EST MOD 30 MIN: CPT | Performed by: STUDENT IN AN ORGANIZED HEALTH CARE EDUCATION/TRAINING PROGRAM

## 2023-12-08 PROCEDURE — G8399 PT W/DXA RESULTS DOCUMENT: HCPCS | Performed by: STUDENT IN AN ORGANIZED HEALTH CARE EDUCATION/TRAINING PROGRAM

## 2023-12-08 PROCEDURE — G8419 CALC BMI OUT NRM PARAM NOF/U: HCPCS | Performed by: STUDENT IN AN ORGANIZED HEALTH CARE EDUCATION/TRAINING PROGRAM

## 2023-12-08 PROCEDURE — G8484 FLU IMMUNIZE NO ADMIN: HCPCS | Performed by: STUDENT IN AN ORGANIZED HEALTH CARE EDUCATION/TRAINING PROGRAM

## 2023-12-08 PROCEDURE — 1090F PRES/ABSN URINE INCON ASSESS: CPT | Performed by: STUDENT IN AN ORGANIZED HEALTH CARE EDUCATION/TRAINING PROGRAM

## 2023-12-08 PROCEDURE — 81003 URINALYSIS AUTO W/O SCOPE: CPT | Performed by: STUDENT IN AN ORGANIZED HEALTH CARE EDUCATION/TRAINING PROGRAM

## 2023-12-08 PROCEDURE — 1036F TOBACCO NON-USER: CPT | Performed by: STUDENT IN AN ORGANIZED HEALTH CARE EDUCATION/TRAINING PROGRAM

## 2023-12-08 PROCEDURE — G8427 DOCREV CUR MEDS BY ELIG CLIN: HCPCS | Performed by: STUDENT IN AN ORGANIZED HEALTH CARE EDUCATION/TRAINING PROGRAM

## 2023-12-08 PROCEDURE — 3075F SYST BP GE 130 - 139MM HG: CPT | Performed by: STUDENT IN AN ORGANIZED HEALTH CARE EDUCATION/TRAINING PROGRAM

## 2023-12-08 RX ORDER — CEFDINIR 300 MG/1
300 CAPSULE ORAL 2 TIMES DAILY
Qty: 10 CAPSULE | Refills: 0 | Status: SHIPPED | OUTPATIENT
Start: 2023-12-08 | End: 2023-12-13

## 2023-12-08 ASSESSMENT — ENCOUNTER SYMPTOMS
BACK PAIN: 0
EYE PAIN: 0
EYE DISCHARGE: 0
EYE REDNESS: 0
CONSTIPATION: 0
EYE ITCHING: 0
SHORTNESS OF BREATH: 0
DIARRHEA: 0
FACIAL SWELLING: 0
VOMITING: 0
ABDOMINAL PAIN: 0
COLOR CHANGE: 0

## 2023-12-08 ASSESSMENT — PATIENT HEALTH QUESTIONNAIRE - PHQ9
3. TROUBLE FALLING OR STAYING ASLEEP: 0
5. POOR APPETITE OR OVEREATING: 0
1. LITTLE INTEREST OR PLEASURE IN DOING THINGS: 0
SUM OF ALL RESPONSES TO PHQ QUESTIONS 1-9: 0
SUM OF ALL RESPONSES TO PHQ QUESTIONS 1-9: 0
7. TROUBLE CONCENTRATING ON THINGS, SUCH AS READING THE NEWSPAPER OR WATCHING TELEVISION: 0
6. FEELING BAD ABOUT YOURSELF - OR THAT YOU ARE A FAILURE OR HAVE LET YOURSELF OR YOUR FAMILY DOWN: 0
SUM OF ALL RESPONSES TO PHQ QUESTIONS 1-9: 0
8. MOVING OR SPEAKING SO SLOWLY THAT OTHER PEOPLE COULD HAVE NOTICED. OR THE OPPOSITE, BEING SO FIGETY OR RESTLESS THAT YOU HAVE BEEN MOVING AROUND A LOT MORE THAN USUAL: 0
4. FEELING TIRED OR HAVING LITTLE ENERGY: 0
SUM OF ALL RESPONSES TO PHQ9 QUESTIONS 1 & 2: 0
9. THOUGHTS THAT YOU WOULD BE BETTER OFF DEAD, OR OF HURTING YOURSELF: 0
2. FEELING DOWN, DEPRESSED OR HOPELESS: 0
10. IF YOU CHECKED OFF ANY PROBLEMS, HOW DIFFICULT HAVE THESE PROBLEMS MADE IT FOR YOU TO DO YOUR WORK, TAKE CARE OF THINGS AT HOME, OR GET ALONG WITH OTHER PEOPLE: 0
SUM OF ALL RESPONSES TO PHQ QUESTIONS 1-9: 0

## 2023-12-08 NOTE — PROGRESS NOTES
Dona Sethi is a 78 y.o.  female and presents with    Chief Complaint   Patient presents with    Hypertension    Urinary Retention     For last week           Subjective:    Was supposed to be taking the estrace but she has not been doing it. She has been having hemorrhoids. She feels like that is the reason why she had some blood in her urine. Hypertension follow up:  Taking medications as prescribed: Yes  Checking BP at home: Yes  Symptoms: none  Low sodium diet: Yes  Exercise: No         Patient Active Problem List   Diagnosis    Senile osteopenia    Uterine prolapse    Cystocele, midline    Hypercholesteremia    Recurrent UTI    Anxiety    Recurrent depression (HCC)      Past Medical History:   Diagnosis Date    Allergic rhinitis     Anxiety     Arthropathy     Atrophic vaginitis     Back pain     Chest pain, atypical     COVID-19 01/2022    Cystocele     Depression     Flank pain     Heel pain     HTN (hypertension)     Hypercholesteremia     Hyperlipidemia     Leg pain     Menopause     Metabolic disease     Mitral valve prolapse     with mitral regurgitation    Osteopenia     Recurrent UTI     Uterine prolapse     UTI (urinary tract infection)       Past Surgical History:   Procedure Laterality Date    BREAST BIOPSY Left ? 2010    Benign    CATARACT REMOVAL Right 2014      Family History   Problem Relation Age of Onset    Heart Disease Sister     Heart Disease Brother     Heart Disease Mother     Cancer Father      Social History     Socioeconomic History    Marital status:      Spouse name: Not on file    Number of children: Not on file    Years of education: Not on file    Highest education level: Not on file   Occupational History    Not on file   Tobacco Use    Smoking status: Never    Smokeless tobacco: Never   Vaping Use    Vaping Use: Never used   Substance and Sexual Activity    Alcohol use: No     Alcohol/week: 0.0 standard drinks of alcohol    Drug use: No    Sexual activity:

## 2023-12-08 NOTE — PROGRESS NOTES
Nathaly Lal is a 78 y.o. presents today for   Chief Complaint   Patient presents with    Hypertension     Is someone accompanying this pt? No     Is the patient using any DME equipment during OV? No     Health Maintenance Due   Topic Date Due    COVID-19 Vaccine (1) Never done    DTaP/Tdap/Td vaccine (1 - Tdap) Never done    Shingles vaccine (1 of 2) Never done    Respiratory Syncytial Virus (RSV) age 61 yrs+ (3 - 1-dose 60+ series) Never done         Coordination of Care:   1. \"Have you been to the ER, urgent care clinic since your last visit? Hospitalized since your last visit? \" No    2. \"Have you seen or consulted any other health care providers outside of the [address] [place] [address] since your last visit? \" No     3. For patients aged 43-73: Has the patient had a colonoscopy / FIT/ Cologuard? NA - based on age      If the patient is female:    4. For patients aged 43-66: Has the patient had a mammogram within the past 2 years? NA - based on age or sex      11. For patients aged 21-65: Has the patient had a pap smear?  NA - based on age or sex    Jose Bronson

## 2023-12-18 ENCOUNTER — TELEPHONE (OUTPATIENT)
Facility: CLINIC | Age: 78
End: 2023-12-18

## 2023-12-18 NOTE — TELEPHONE ENCOUNTER
Patient states she was treated for a UTI on 12/8/23 and took all of the medication, but still has the symptoms.  Also, she is experiencing night sweats.    Patient is requesting more medication.      Please call to discuss and/or advise.  Thank you

## 2023-12-26 ENCOUNTER — TELEPHONE (OUTPATIENT)
Facility: CLINIC | Age: 78
End: 2023-12-26

## 2023-12-26 DIAGNOSIS — N39.0 RECURRENT UTI: Primary | ICD-10-CM

## 2023-12-26 RX ORDER — CEFDINIR 300 MG/1
300 CAPSULE ORAL 2 TIMES DAILY
Qty: 20 CAPSULE | Refills: 0 | Status: SHIPPED | OUTPATIENT
Start: 2023-12-26 | End: 2024-01-05

## 2023-12-26 NOTE — TELEPHONE ENCOUNTER
Pt called stating she has finished taking the antibiotics for her UTI last week but is still having symptoms. Pt did say if she is not able to get seen with you she will try to go back to Urgent Care.

## 2023-12-26 NOTE — TELEPHONE ENCOUNTER
Called pt. She has  hx of recurrent UTIs. She feels like the last time she was given abx it helped significantly but did not completely cleared. Gave a new order for her since the sx are reocurring.

## 2024-02-19 ENCOUNTER — HOSPITAL ENCOUNTER (OUTPATIENT)
Facility: HOSPITAL | Age: 79
Setting detail: SPECIMEN
Discharge: HOME | End: 2024-02-22
Payer: MEDICARE

## 2024-02-19 ENCOUNTER — OFFICE VISIT (OUTPATIENT)
Facility: CLINIC | Age: 79
End: 2024-02-19
Payer: MEDICARE

## 2024-02-19 VITALS
WEIGHT: 134.4 LBS | HEART RATE: 72 BPM | BODY MASS INDEX: 26.39 KG/M2 | DIASTOLIC BLOOD PRESSURE: 73 MMHG | RESPIRATION RATE: 17 BRPM | TEMPERATURE: 97.2 F | HEIGHT: 60 IN | SYSTOLIC BLOOD PRESSURE: 114 MMHG

## 2024-02-19 DIAGNOSIS — I10 ESSENTIAL HYPERTENSION: ICD-10-CM

## 2024-02-19 DIAGNOSIS — R30.0 DYSURIA: Primary | ICD-10-CM

## 2024-02-19 DIAGNOSIS — F41.3 OTHER MIXED ANXIETY DISORDERS: ICD-10-CM

## 2024-02-19 DIAGNOSIS — R30.0 DYSURIA: ICD-10-CM

## 2024-02-19 DIAGNOSIS — F33.9 RECURRENT DEPRESSION (HCC): ICD-10-CM

## 2024-02-19 LAB
APPEARANCE UR: ABNORMAL
BACTERIA URNS QL MICRO: ABNORMAL /HPF
BILIRUB UR QL: NEGATIVE
COLOR UR: YELLOW
EPITH CASTS URNS QL MICRO: ABNORMAL /LPF (ref 0–5)
GLUCOSE UR STRIP.AUTO-MCNC: NEGATIVE MG/DL
HGB UR QL STRIP: NEGATIVE
KETONES UR QL STRIP.AUTO: NEGATIVE MG/DL
LEUKOCYTE ESTERASE UR QL STRIP.AUTO: ABNORMAL
NITRITE UR QL STRIP.AUTO: NEGATIVE
PH UR STRIP: >8.5 [PH] (ref 5–8)
PROT UR STRIP-MCNC: 30 MG/DL
RBC #/AREA URNS HPF: ABNORMAL /HPF (ref 0–5)
SP GR UR REFRACTOMETRY: 1.02 (ref 1–1.03)
UROBILINOGEN UR QL STRIP.AUTO: 0.2 EU/DL (ref 0.2–1)
WBC URNS QL MICRO: ABNORMAL /HPF (ref 0–4)

## 2024-02-19 PROCEDURE — 1123F ACP DISCUSS/DSCN MKR DOCD: CPT | Performed by: STUDENT IN AN ORGANIZED HEALTH CARE EDUCATION/TRAINING PROGRAM

## 2024-02-19 PROCEDURE — G8399 PT W/DXA RESULTS DOCUMENT: HCPCS | Performed by: STUDENT IN AN ORGANIZED HEALTH CARE EDUCATION/TRAINING PROGRAM

## 2024-02-19 PROCEDURE — G8484 FLU IMMUNIZE NO ADMIN: HCPCS | Performed by: STUDENT IN AN ORGANIZED HEALTH CARE EDUCATION/TRAINING PROGRAM

## 2024-02-19 PROCEDURE — 1036F TOBACCO NON-USER: CPT | Performed by: STUDENT IN AN ORGANIZED HEALTH CARE EDUCATION/TRAINING PROGRAM

## 2024-02-19 PROCEDURE — 87077 CULTURE AEROBIC IDENTIFY: CPT

## 2024-02-19 PROCEDURE — 99214 OFFICE O/P EST MOD 30 MIN: CPT | Performed by: STUDENT IN AN ORGANIZED HEALTH CARE EDUCATION/TRAINING PROGRAM

## 2024-02-19 PROCEDURE — 3074F SYST BP LT 130 MM HG: CPT | Performed by: STUDENT IN AN ORGANIZED HEALTH CARE EDUCATION/TRAINING PROGRAM

## 2024-02-19 PROCEDURE — 87186 SC STD MICRODIL/AGAR DIL: CPT

## 2024-02-19 PROCEDURE — 3078F DIAST BP <80 MM HG: CPT | Performed by: STUDENT IN AN ORGANIZED HEALTH CARE EDUCATION/TRAINING PROGRAM

## 2024-02-19 PROCEDURE — 87086 URINE CULTURE/COLONY COUNT: CPT

## 2024-02-19 PROCEDURE — 1090F PRES/ABSN URINE INCON ASSESS: CPT | Performed by: STUDENT IN AN ORGANIZED HEALTH CARE EDUCATION/TRAINING PROGRAM

## 2024-02-19 PROCEDURE — 81001 URINALYSIS AUTO W/SCOPE: CPT

## 2024-02-19 PROCEDURE — G8427 DOCREV CUR MEDS BY ELIG CLIN: HCPCS | Performed by: STUDENT IN AN ORGANIZED HEALTH CARE EDUCATION/TRAINING PROGRAM

## 2024-02-19 PROCEDURE — G8419 CALC BMI OUT NRM PARAM NOF/U: HCPCS | Performed by: STUDENT IN AN ORGANIZED HEALTH CARE EDUCATION/TRAINING PROGRAM

## 2024-02-19 RX ORDER — CEFDINIR 300 MG/1
300 CAPSULE ORAL 2 TIMES DAILY
Qty: 10 CAPSULE | Refills: 0 | Status: SHIPPED | OUTPATIENT
Start: 2024-02-19 | End: 2024-02-24

## 2024-02-19 RX ORDER — SERTRALINE HYDROCHLORIDE 100 MG/1
100 TABLET, FILM COATED ORAL DAILY
Qty: 90 TABLET | Refills: 1 | Status: SHIPPED | OUTPATIENT
Start: 2024-02-19

## 2024-02-19 NOTE — PROGRESS NOTES
Kristi Boo is a 78 y.o. year old female who presents today for   Chief Complaint   Patient presents with    Follow-up     6 mth follow up       Is someone accompanying this pt? no    Is the patient using any DME equipment during OV? no    Depression Screenin/8/2023    11:33 AM 2023    10:23 AM 2023    10:04 AM 2023    12:14 PM 2023     3:36 PM 2023    11:39 AM 2022     2:44 PM   PHQ-9 Questionaire   Little interest or pleasure in doing things 0 0 0 0 0 0 0   Feeling down, depressed, or hopeless 0 0 0 0 0 0 0   Trouble falling or staying asleep, or sleeping too much 0 0 0 0      Feeling tired or having little energy 0 0 0 0      Poor appetite or overeating 0 0 0 0      Feeling bad about yourself - or that you are a failure or have let yourself or your family down 0 0 0 0      Trouble concentrating on things, such as reading the newspaper or watching television 0 0 0 0      Moving or speaking so slowly that other people could have noticed. Or the opposite - being so fidgety or restless that you have been moving around a lot more than usual 0 0 0 0      Thoughts that you would be better off dead, or of hurting yourself in some way 0 0 0 0      PHQ-9 Total Score 0 0 0 0 0 0 0   If you checked off any problems, how difficult have these problems made it for you to do your work, take care of things at home, or get along with other people? 0 0  0          Abuse Screenin/25/2023     3:00 PM   AMB Abuse Screening   Do you ever feel afraid of your partner? N   Are you in a relationship with someone who physically or mentally threatens you? N   Is it safe for you to go home? Y       Learning Assessment:  No question data found.    Fall Risk:      2023    11:33 AM 2023    10:23 AM 2023    10:03 AM 2023    12:15 PM   Fall Risk   2 or more falls in past year? no no yes no   Fall with injury in past year? no no yes no           Coordination of Care:   1.

## 2024-02-19 NOTE — PROGRESS NOTES
Kristi Boo is a 78 y.o.  female and presents with    Chief Complaint   Patient presents with    Follow-up     6 mth follow up           Subjective:    Hypertension follow up:  Taking medications as prescribed: Yes  Checking BP at home: Yes  Symptoms: none  Low sodium diet: Yes  Exercise: No    She has been taking the sertraline feels like she is doing well on it.     Feels like she has been noticing slight dysuria yesterday, nothing today but is concerned due to the fact that she has recurring UTIs.      Patient Active Problem List   Diagnosis    Senile osteopenia    Uterine prolapse    Cystocele, midline    Hypercholesteremia    Recurrent UTI    Anxiety    Recurrent depression (HCC)      Past Medical History:   Diagnosis Date    Allergic rhinitis     Anxiety     Arthropathy     Atrophic vaginitis     Back pain     Chest pain, atypical     COVID-19 01/2022    Cystocele     Depression     Flank pain     Heel pain     HTN (hypertension)     Hypercholesteremia     Hyperlipidemia     Leg pain     Menopause     Metabolic disease     Mitral valve prolapse     with mitral regurgitation    Osteopenia     Recurrent UTI     Uterine prolapse     UTI (urinary tract infection)       Past Surgical History:   Procedure Laterality Date    BREAST BIOPSY Left ?2010    Benign    CATARACT REMOVAL Right 2014      Family History   Problem Relation Age of Onset    Heart Disease Sister     Heart Disease Brother     Heart Disease Mother     Cancer Father      Social History     Socioeconomic History    Marital status:      Spouse name: Not on file    Number of children: Not on file    Years of education: Not on file    Highest education level: Not on file   Occupational History    Not on file   Tobacco Use    Smoking status: Never    Smokeless tobacco: Never   Vaping Use    Vaping Use: Never used   Substance and Sexual Activity    Alcohol use: No     Alcohol/week: 0.0 standard drinks of alcohol    Drug use: No    Sexual

## 2024-02-21 ASSESSMENT — ENCOUNTER SYMPTOMS
ABDOMINAL PAIN: 0
EYE DISCHARGE: 0
EYE PAIN: 0
EYE REDNESS: 0
COLOR CHANGE: 0
BACK PAIN: 0
DIARRHEA: 0
CONSTIPATION: 0
SHORTNESS OF BREATH: 0
FACIAL SWELLING: 0
EYE ITCHING: 0
VOMITING: 0

## 2024-02-22 LAB
BACTERIA SPEC CULT: ABNORMAL
CC UR VC: ABNORMAL
SERVICE CMNT-IMP: ABNORMAL

## 2024-03-05 ENCOUNTER — TELEPHONE (OUTPATIENT)
Facility: CLINIC | Age: 79
End: 2024-03-05

## 2024-03-05 NOTE — TELEPHONE ENCOUNTER
Pt called stating she still thinks she has UTI. She completed abx this weekend and feels sick to her stomach. Pt would like to speak to Dr. Edmondson, advised her she is not in office but ok with waiting.

## 2024-03-08 ENCOUNTER — TELEPHONE (OUTPATIENT)
Facility: CLINIC | Age: 79
End: 2024-03-08

## 2024-03-08 DIAGNOSIS — N39.0 RECURRENT UTI: Primary | ICD-10-CM

## 2024-03-08 RX ORDER — FLUCONAZOLE 150 MG/1
150 TABLET ORAL ONCE
Qty: 1 TABLET | Refills: 0 | Status: SHIPPED | OUTPATIENT
Start: 2024-03-08 | End: 2024-03-08

## 2024-03-08 RX ORDER — CIPROFLOXACIN 250 MG/1
250 TABLET, FILM COATED ORAL 2 TIMES DAILY
Qty: 6 TABLET | Refills: 0 | Status: SHIPPED | OUTPATIENT
Start: 2024-03-08 | End: 2024-03-11

## 2024-03-08 NOTE — TELEPHONE ENCOUNTER
Patient contacted the office stating that she is still having symptoms of her UTI after finishing her abx over this past weekend. She would like to know if she should have another round of abx called in. Please advise, thank you.    Call back: [phone]

## 2024-03-08 NOTE — TELEPHONE ENCOUNTER
Called patient and send a new prescription for antibiotic.  She verbalized understanding.  Also sent Diflucan

## 2024-05-10 DIAGNOSIS — I10 ESSENTIAL HYPERTENSION: ICD-10-CM

## 2024-05-13 ENCOUNTER — HOSPITAL ENCOUNTER (OUTPATIENT)
Facility: HOSPITAL | Age: 79
Setting detail: SPECIMEN
Discharge: HOME | End: 2024-05-16
Payer: MEDICARE

## 2024-05-13 DIAGNOSIS — I10 ESSENTIAL HYPERTENSION: ICD-10-CM

## 2024-05-13 LAB
ALBUMIN SERPL-MCNC: 3.8 G/DL (ref 3.4–5)
ALBUMIN/GLOB SERPL: 1.1 (ref 0.8–1.7)
ALP SERPL-CCNC: 87 U/L (ref 45–117)
ALT SERPL-CCNC: 23 U/L (ref 13–56)
ANION GAP SERPL CALC-SCNC: 5 MMOL/L (ref 3–18)
AST SERPL-CCNC: 19 U/L (ref 10–38)
BILIRUB SERPL-MCNC: 1.1 MG/DL (ref 0.2–1)
BUN SERPL-MCNC: 19 MG/DL (ref 7–18)
BUN/CREAT SERPL: 25 (ref 12–20)
CALCIUM SERPL-MCNC: 9.1 MG/DL (ref 8.5–10.1)
CHLORIDE SERPL-SCNC: 106 MMOL/L (ref 100–111)
CO2 SERPL-SCNC: 29 MMOL/L (ref 21–32)
CREAT SERPL-MCNC: 0.75 MG/DL (ref 0.6–1.3)
ERYTHROCYTE [DISTWIDTH] IN BLOOD BY AUTOMATED COUNT: 13 % (ref 11.6–14.5)
GLOBULIN SER CALC-MCNC: 3.6 G/DL (ref 2–4)
GLUCOSE SERPL-MCNC: 86 MG/DL (ref 74–99)
HCT VFR BLD AUTO: 43.3 % (ref 35–45)
HGB BLD-MCNC: 14 G/DL (ref 12–16)
MCH RBC QN AUTO: 30.3 PG (ref 24–34)
MCHC RBC AUTO-ENTMCNC: 32.3 G/DL (ref 31–37)
MCV RBC AUTO: 93.7 FL (ref 78–100)
NRBC # BLD: 0 K/UL (ref 0–0.01)
NRBC BLD-RTO: 0 PER 100 WBC
PLATELET # BLD AUTO: 228 K/UL (ref 135–420)
PMV BLD AUTO: 11.1 FL (ref 9.2–11.8)
POTASSIUM SERPL-SCNC: 4.4 MMOL/L (ref 3.5–5.5)
PROT SERPL-MCNC: 7.4 G/DL (ref 6.4–8.2)
RBC # BLD AUTO: 4.62 M/UL (ref 4.2–5.3)
SODIUM SERPL-SCNC: 140 MMOL/L (ref 136–145)
WBC # BLD AUTO: 7.2 K/UL (ref 4.6–13.2)

## 2024-05-13 PROCEDURE — 36415 COLL VENOUS BLD VENIPUNCTURE: CPT

## 2024-05-13 PROCEDURE — 80053 COMPREHEN METABOLIC PANEL: CPT

## 2024-05-13 PROCEDURE — 85027 COMPLETE CBC AUTOMATED: CPT

## 2024-05-13 RX ORDER — AMLODIPINE BESYLATE 2.5 MG/1
2.5 TABLET ORAL DAILY
Qty: 30 TABLET | Refills: 5 | Status: SHIPPED | OUTPATIENT
Start: 2024-05-13

## 2024-05-13 NOTE — TELEPHONE ENCOUNTER
Medication(s) requesting:   Requested Prescriptions     Pending Prescriptions Disp Refills    amLODIPine (NORVASC) 2.5 MG tablet [Pharmacy Med Name: AMLODIPINE BESYLATE 2.5MG TABLETS] 30 tablet 5     Sig: TAKE 1 TABLET BY MOUTH DAILY       Last office visit:  02/19/2024  Next office visit DMA: 5/13/2024

## 2024-05-20 ENCOUNTER — OFFICE VISIT (OUTPATIENT)
Facility: CLINIC | Age: 79
End: 2024-05-20
Payer: MEDICARE

## 2024-05-20 VITALS
BODY MASS INDEX: 26.42 KG/M2 | OXYGEN SATURATION: 94 % | RESPIRATION RATE: 12 BRPM | TEMPERATURE: 97.6 F | DIASTOLIC BLOOD PRESSURE: 73 MMHG | SYSTOLIC BLOOD PRESSURE: 120 MMHG | WEIGHT: 134.6 LBS | HEIGHT: 60 IN | HEART RATE: 67 BPM

## 2024-05-20 DIAGNOSIS — M79.605 LEFT LEG PAIN: ICD-10-CM

## 2024-05-20 DIAGNOSIS — I73.9 CLAUDICATION OF BOTH LOWER EXTREMITIES (HCC): Primary | ICD-10-CM

## 2024-05-20 DIAGNOSIS — I10 ESSENTIAL HYPERTENSION: ICD-10-CM

## 2024-05-20 PROCEDURE — 1036F TOBACCO NON-USER: CPT | Performed by: STUDENT IN AN ORGANIZED HEALTH CARE EDUCATION/TRAINING PROGRAM

## 2024-05-20 PROCEDURE — 99214 OFFICE O/P EST MOD 30 MIN: CPT | Performed by: STUDENT IN AN ORGANIZED HEALTH CARE EDUCATION/TRAINING PROGRAM

## 2024-05-20 PROCEDURE — G8419 CALC BMI OUT NRM PARAM NOF/U: HCPCS | Performed by: STUDENT IN AN ORGANIZED HEALTH CARE EDUCATION/TRAINING PROGRAM

## 2024-05-20 PROCEDURE — 3078F DIAST BP <80 MM HG: CPT | Performed by: STUDENT IN AN ORGANIZED HEALTH CARE EDUCATION/TRAINING PROGRAM

## 2024-05-20 PROCEDURE — 1123F ACP DISCUSS/DSCN MKR DOCD: CPT | Performed by: STUDENT IN AN ORGANIZED HEALTH CARE EDUCATION/TRAINING PROGRAM

## 2024-05-20 PROCEDURE — G8427 DOCREV CUR MEDS BY ELIG CLIN: HCPCS | Performed by: STUDENT IN AN ORGANIZED HEALTH CARE EDUCATION/TRAINING PROGRAM

## 2024-05-20 PROCEDURE — 3074F SYST BP LT 130 MM HG: CPT | Performed by: STUDENT IN AN ORGANIZED HEALTH CARE EDUCATION/TRAINING PROGRAM

## 2024-05-20 PROCEDURE — G8399 PT W/DXA RESULTS DOCUMENT: HCPCS | Performed by: STUDENT IN AN ORGANIZED HEALTH CARE EDUCATION/TRAINING PROGRAM

## 2024-05-20 PROCEDURE — 1090F PRES/ABSN URINE INCON ASSESS: CPT | Performed by: STUDENT IN AN ORGANIZED HEALTH CARE EDUCATION/TRAINING PROGRAM

## 2024-05-20 SDOH — ECONOMIC STABILITY: FOOD INSECURITY: WITHIN THE PAST 12 MONTHS, THE FOOD YOU BOUGHT JUST DIDN'T LAST AND YOU DIDN'T HAVE MONEY TO GET MORE.: NEVER TRUE

## 2024-05-20 SDOH — ECONOMIC STABILITY: FOOD INSECURITY: WITHIN THE PAST 12 MONTHS, YOU WORRIED THAT YOUR FOOD WOULD RUN OUT BEFORE YOU GOT MONEY TO BUY MORE.: NEVER TRUE

## 2024-05-20 SDOH — ECONOMIC STABILITY: INCOME INSECURITY: HOW HARD IS IT FOR YOU TO PAY FOR THE VERY BASICS LIKE FOOD, HOUSING, MEDICAL CARE, AND HEATING?: NOT HARD AT ALL

## 2024-05-20 ASSESSMENT — PATIENT HEALTH QUESTIONNAIRE - PHQ9
6. FEELING BAD ABOUT YOURSELF - OR THAT YOU ARE A FAILURE OR HAVE LET YOURSELF OR YOUR FAMILY DOWN: NOT AT ALL
7. TROUBLE CONCENTRATING ON THINGS, SUCH AS READING THE NEWSPAPER OR WATCHING TELEVISION: NOT AT ALL
SUM OF ALL RESPONSES TO PHQ QUESTIONS 1-9: 0
SUM OF ALL RESPONSES TO PHQ QUESTIONS 1-9: 0
5. POOR APPETITE OR OVEREATING: NOT AT ALL
3. TROUBLE FALLING OR STAYING ASLEEP: NOT AT ALL
10. IF YOU CHECKED OFF ANY PROBLEMS, HOW DIFFICULT HAVE THESE PROBLEMS MADE IT FOR YOU TO DO YOUR WORK, TAKE CARE OF THINGS AT HOME, OR GET ALONG WITH OTHER PEOPLE: NOT DIFFICULT AT ALL
SUM OF ALL RESPONSES TO PHQ9 QUESTIONS 1 & 2: 0
8. MOVING OR SPEAKING SO SLOWLY THAT OTHER PEOPLE COULD HAVE NOTICED. OR THE OPPOSITE, BEING SO FIGETY OR RESTLESS THAT YOU HAVE BEEN MOVING AROUND A LOT MORE THAN USUAL: NOT AT ALL
4. FEELING TIRED OR HAVING LITTLE ENERGY: NOT AT ALL
1. LITTLE INTEREST OR PLEASURE IN DOING THINGS: NOT AT ALL
SUM OF ALL RESPONSES TO PHQ QUESTIONS 1-9: 0
9. THOUGHTS THAT YOU WOULD BE BETTER OFF DEAD, OR OF HURTING YOURSELF: NOT AT ALL
SUM OF ALL RESPONSES TO PHQ QUESTIONS 1-9: 0
2. FEELING DOWN, DEPRESSED OR HOPELESS: NOT AT ALL

## 2024-05-20 ASSESSMENT — ENCOUNTER SYMPTOMS
EYE DISCHARGE: 0
CONSTIPATION: 0
SHORTNESS OF BREATH: 0
EYE REDNESS: 0
DIARRHEA: 0
EYE ITCHING: 0
COLOR CHANGE: 0
ABDOMINAL PAIN: 0
FACIAL SWELLING: 0
BACK PAIN: 0
EYE PAIN: 0
VOMITING: 0

## 2024-05-20 NOTE — PROGRESS NOTES
Kristi Boo is a 78 y.o.  female and presents with    Chief Complaint   Patient presents with    Discuss Labs           Subjective:    Hypertension follow up:  Taking medications as prescribed: Yes  Checking BP at home: Yes  Symptoms: none  Low sodium diet: Yes  Exercise: No    Pt states she has been having pain when she is walking in her calf's. She has to stop and goes to rest in order for them to get better. She has had this for about a month. She has notices it when she walks for prolonged periods like when she is at [employer].     When she is sitting she also has been noting pain in the left calf/lateral side of her leg. It could be when sitting. Tender to palpation.     Patient Active Problem List   Diagnosis    Senile osteopenia    Uterine prolapse    Cystocele, midline    Hypercholesteremia    Recurrent UTI    Anxiety    Recurrent depression (HCC)      Past Medical History:   Diagnosis Date    Allergic rhinitis     Anxiety     Arthropathy     Atrophic vaginitis     Back pain     Chest pain, atypical     COVID-19 01/2022    Cystocele     Depression     Flank pain     Heel pain     HTN (hypertension)     Hypercholesteremia     Hyperlipidemia     Leg pain     Menopause     Metabolic disease     Mitral valve prolapse     with mitral regurgitation    Osteopenia     Recurrent UTI     Uterine prolapse     UTI (urinary tract infection)       Past Surgical History:   Procedure Laterality Date    BREAST BIOPSY Left ?2010    Benign    CATARACT REMOVAL Right 2014      Family History   Problem Relation Age of Onset    Heart Disease Sister     Heart Disease Brother     Heart Disease Mother     Cancer Father      Social History     Socioeconomic History    Marital status:      Spouse name: Not on file    Number of children: Not on file    Years of education: Not on file    Highest education level: Not on file   Occupational History    Not on file   Tobacco Use    Smoking status: Never    Smokeless tobacco:

## 2024-05-20 NOTE — PROGRESS NOTES
Kristi Boo is a 78 y.o. year old female who presents today for   Chief Complaint   Patient presents with    Discuss Labs       Is someone accompanying this pt? No     Is the patient using any DME equipment during OV? No     Depression Screenin/20/2024    11:33 AM 2023    11:33 AM 2023    10:23 AM 2023    10:04 AM 2023    12:14 PM 2023     3:36 PM 2023    11:39 AM   PHQ-9 Questionaire   Little interest or pleasure in doing things 0 0 0 0 0 0 0   Feeling down, depressed, or hopeless 0 0 0 0 0 0 0   Trouble falling or staying asleep, or sleeping too much 0 0 0 0 0     Feeling tired or having little energy 0 0 0 0 0     Poor appetite or overeating 0 0 0 0 0     Feeling bad about yourself - or that you are a failure or have let yourself or your family down 0 0 0 0 0     Trouble concentrating on things, such as reading the newspaper or watching television 0 0 0 0 0     Moving or speaking so slowly that other people could have noticed. Or the opposite - being so fidgety or restless that you have been moving around a lot more than usual 0 0 0 0 0     Thoughts that you would be better off dead, or of hurting yourself in some way 0 0 0 0 0     PHQ-9 Total Score 0 0 0 0 0 0 0   If you checked off any problems, how difficult have these problems made it for you to do your work, take care of things at home, or get along with other people? 0 0 0  0         Abuse Screenin/25/2023     3:00 PM   AMB Abuse Screening   Do you ever feel afraid of your partner? N   Are you in a relationship with someone who physically or mentally threatens you? N   Is it safe for you to go home? Y       Learning Assessment:  No question data found.    Fall Risk:      2023    11:33 AM 2023    10:23 AM 2023    10:03 AM 2023    12:15 PM   Fall Risk   2 or more falls in past year? no no yes no   Fall with injury in past year? no no yes no           Coordination of Care:   1. \"Have you

## 2024-06-06 ENCOUNTER — TELEPHONE (OUTPATIENT)
Facility: CLINIC | Age: 79
End: 2024-06-06

## 2024-06-06 NOTE — TELEPHONE ENCOUNTER
Pt called to let Dr. Edmondson know she had gotten her CT Scans done on her leg.    Pt also requested a call via phone when available. She would like to know if she should still be taking her bp medication as she states her bp was only high once before and doesn't feel like she needs to continue to take the medication.

## 2024-06-07 NOTE — TELEPHONE ENCOUNTER
Called patient.  Discussed with her there was no DVT noted on study for her veins, however she has artery study pending for July.  She wants to try to get off her blood pressure medication because she feels that sometimes her blood pressure dips the diastolic into the low 60s and that makes her feel somewhat tired.  Discussed with patient that we will try to get her off the medication for the weekend, and that she can send me a [employer] message stating how her blood pressures were during those days.

## 2024-08-05 ENCOUNTER — TELEPHONE (OUTPATIENT)
Facility: CLINIC | Age: 79
End: 2024-08-05

## 2024-08-05 NOTE — TELEPHONE ENCOUNTER
Pt called the office requesting to be seen for a possible UTI. Pt has an upcoming appt on 8/20 but is requesting if a antibiotic could be sent in for her. If not, pt will follow up at urgent care.

## 2024-08-20 ENCOUNTER — OFFICE VISIT (OUTPATIENT)
Facility: CLINIC | Age: 79
End: 2024-08-20
Payer: MEDICARE

## 2024-08-20 VITALS
HEIGHT: 60 IN | RESPIRATION RATE: 14 BRPM | DIASTOLIC BLOOD PRESSURE: 69 MMHG | BODY MASS INDEX: 26.19 KG/M2 | OXYGEN SATURATION: 93 % | HEART RATE: 67 BPM | WEIGHT: 133.4 LBS | TEMPERATURE: 97.7 F | SYSTOLIC BLOOD PRESSURE: 118 MMHG

## 2024-08-20 DIAGNOSIS — N39.0 RECURRENT UTI: ICD-10-CM

## 2024-08-20 DIAGNOSIS — R30.0 DYSURIA: Primary | ICD-10-CM

## 2024-08-20 LAB
BILIRUBIN, URINE, POC: NEGATIVE
BLOOD URINE, POC: NORMAL
GLUCOSE URINE, POC: NEGATIVE
KETONES, URINE, POC: NEGATIVE
LEUKOCYTE ESTERASE, URINE, POC: NORMAL
NITRITE, URINE, POC: POSITIVE
PH, URINE, POC: 6 (ref 4.6–8)
PROTEIN,URINE, POC: NORMAL
SPECIFIC GRAVITY, URINE, POC: 1.02 (ref 1–1.03)
URINALYSIS CLARITY, POC: NORMAL
URINALYSIS COLOR, POC: YELLOW
UROBILINOGEN, POC: NORMAL

## 2024-08-20 PROCEDURE — 1090F PRES/ABSN URINE INCON ASSESS: CPT | Performed by: STUDENT IN AN ORGANIZED HEALTH CARE EDUCATION/TRAINING PROGRAM

## 2024-08-20 PROCEDURE — 99214 OFFICE O/P EST MOD 30 MIN: CPT | Performed by: STUDENT IN AN ORGANIZED HEALTH CARE EDUCATION/TRAINING PROGRAM

## 2024-08-20 PROCEDURE — G8427 DOCREV CUR MEDS BY ELIG CLIN: HCPCS | Performed by: STUDENT IN AN ORGANIZED HEALTH CARE EDUCATION/TRAINING PROGRAM

## 2024-08-20 PROCEDURE — G8399 PT W/DXA RESULTS DOCUMENT: HCPCS | Performed by: STUDENT IN AN ORGANIZED HEALTH CARE EDUCATION/TRAINING PROGRAM

## 2024-08-20 PROCEDURE — 1036F TOBACCO NON-USER: CPT | Performed by: STUDENT IN AN ORGANIZED HEALTH CARE EDUCATION/TRAINING PROGRAM

## 2024-08-20 PROCEDURE — 1123F ACP DISCUSS/DSCN MKR DOCD: CPT | Performed by: STUDENT IN AN ORGANIZED HEALTH CARE EDUCATION/TRAINING PROGRAM

## 2024-08-20 PROCEDURE — G8419 CALC BMI OUT NRM PARAM NOF/U: HCPCS | Performed by: STUDENT IN AN ORGANIZED HEALTH CARE EDUCATION/TRAINING PROGRAM

## 2024-08-20 PROCEDURE — 81003 URINALYSIS AUTO W/O SCOPE: CPT | Performed by: STUDENT IN AN ORGANIZED HEALTH CARE EDUCATION/TRAINING PROGRAM

## 2024-08-20 RX ORDER — CIPROFLOXACIN 250 MG/1
250 TABLET, FILM COATED ORAL 2 TIMES DAILY
Qty: 6 TABLET | Refills: 0 | Status: SHIPPED | OUTPATIENT
Start: 2024-08-20 | End: 2024-08-23

## 2024-08-20 NOTE — PROGRESS NOTES
Kristi Boo is a 79 y.o. year old female who presents today for   Chief Complaint   Patient presents with    Hypertension     3 month f/u    Urinary Tract Infection     Frequent urination          Is someone accompanying this pt? No    Is the patient using any DME equipment during OV? No    Depression Screenin/20/2024    11:33 AM 2023    11:33 AM 2023    10:23 AM 2023    10:04 AM 2023    12:14 PM 2023     3:36 PM 2023    11:39 AM   PHQ-9 Questionaire   Little interest or pleasure in doing things 0 0 0 0 0 0 0   Feeling down, depressed, or hopeless 0 0 0 0 0 0 0   Trouble falling or staying asleep, or sleeping too much 0 0 0 0 0     Feeling tired or having little energy 0 0 0 0 0     Poor appetite or overeating 0 0 0 0 0     Feeling bad about yourself - or that you are a failure or have let yourself or your family down 0 0 0 0 0     Trouble concentrating on things, such as reading the newspaper or watching television 0 0 0 0 0     Moving or speaking so slowly that other people could have noticed. Or the opposite - being so fidgety or restless that you have been moving around a lot more than usual 0 0 0 0 0     Thoughts that you would be better off dead, or of hurting yourself in some way 0 0 0 0 0     PHQ-9 Total Score 0 0 0 0 0 0 0   If you checked off any problems, how difficult have these problems made it for you to do your work, take care of things at home, or get along with other people? 0 0 0  0         Abuse Screenin/25/2023     3:00 PM   AMB Abuse Screening   Do you ever feel afraid of your partner? N   Are you in a relationship with someone who physically or mentally threatens you? N   Is it safe for you to go home? Y       Learning Assessment:  No question data found.    Fall Risk:      2023    11:33 AM 2023    10:23 AM 2023    10:03 AM 2023    12:15 PM   Fall Risk   Do you feel unsteady or are you worried about falling?  no no yes no

## 2024-08-20 NOTE — PROGRESS NOTES
Kristi Boo is a 79 y.o.  female and presents with    Chief Complaint   Patient presents with    Hypertension     3 month f/u    Urinary Tract Infection     Frequent urination              Subjective:    Hypertension follow up:  Taking medications as prescribed: NO  Checking BP at home: Yes  Symptoms: none  Low sodium diet: Yes  Exercise: No    Having cataract surgery on L eye on 09/10    Patient presented to the urgent care on August 5.  She was there due to increased frequency. She was diagnosed with UTI, treated with Keflex.  Patient had treatment for 7 days, which she finished last Monday.  Patient states that she has continued to experience symptoms of UTI, and would like to have treatment for this.      Patient Active Problem List   Diagnosis    Senile osteopenia    Uterine prolapse    Cystocele, midline    Hypercholesteremia    Recurrent UTI    Anxiety    Recurrent depression (HCC)      Past Medical History:   Diagnosis Date    Allergic rhinitis     Anxiety     Arthropathy     Atrophic vaginitis     Back pain     Chest pain, atypical     COVID-19 01/2022    Cystocele     Depression     Flank pain     Heel pain     HTN (hypertension)     Hypercholesteremia     Hyperlipidemia     Leg pain     Menopause     Metabolic disease     Mitral valve prolapse     with mitral regurgitation    Osteopenia     Recurrent UTI     Uterine prolapse     UTI (urinary tract infection)       Past Surgical History:   Procedure Laterality Date    BREAST BIOPSY Left ?2010    Benign    CATARACT REMOVAL Right 2014      Family History   Problem Relation Age of Onset    Heart Disease Sister     Heart Disease Brother     Heart Disease Mother     Cancer Father      Social History     Socioeconomic History    Marital status:      Spouse name: Not on file    Number of children: Not on file    Years of education: Not on file    Highest education level: Not on file   Occupational History    Not on file   Tobacco Use    Smoking

## 2024-08-26 DIAGNOSIS — F41.3 OTHER MIXED ANXIETY DISORDERS: ICD-10-CM

## 2024-08-26 NOTE — TELEPHONE ENCOUNTER
Medication(s) requesting:   Requested Prescriptions     Pending Prescriptions Disp Refills    sertraline (ZOLOFT) 100 MG tablet [Pharmacy Med Name: SERTRALINE 100MG TABLETS] 90 tablet 1     Sig: TAKE 1 TABLET BY MOUTH DAILY       Last office visit:  08/20/2024  Next office visit DMA: 10/11/2024

## 2024-08-27 RX ORDER — SERTRALINE HYDROCHLORIDE 100 MG/1
100 TABLET, FILM COATED ORAL DAILY
Qty: 90 TABLET | Refills: 1 | Status: SHIPPED | OUTPATIENT
Start: 2024-08-27

## 2024-10-02 ENCOUNTER — OFFICE VISIT (OUTPATIENT)
Facility: CLINIC | Age: 79
End: 2024-10-02
Payer: MEDICARE

## 2024-10-02 VITALS
OXYGEN SATURATION: 94 % | TEMPERATURE: 98 F | BODY MASS INDEX: 26.5 KG/M2 | RESPIRATION RATE: 14 BRPM | WEIGHT: 135 LBS | SYSTOLIC BLOOD PRESSURE: 149 MMHG | DIASTOLIC BLOOD PRESSURE: 81 MMHG | HEART RATE: 67 BPM | HEIGHT: 60 IN

## 2024-10-02 DIAGNOSIS — E78.00 HYPERCHOLESTEREMIA: ICD-10-CM

## 2024-10-02 DIAGNOSIS — R30.0 DYSURIA: Primary | ICD-10-CM

## 2024-10-02 LAB
BILIRUBIN, URINE, POC: NEGATIVE
BLOOD URINE, POC: NORMAL
GLUCOSE URINE, POC: NEGATIVE
KETONES, URINE, POC: NEGATIVE
LEUKOCYTE ESTERASE, URINE, POC: NORMAL
NITRITE, URINE, POC: NEGATIVE
PH, URINE, POC: 7 (ref 4.6–8)
PROTEIN,URINE, POC: NEGATIVE
SPECIFIC GRAVITY, URINE, POC: 1.02 (ref 1–1.03)
URINALYSIS CLARITY, POC: NORMAL
URINALYSIS COLOR, POC: NORMAL
UROBILINOGEN, POC: NORMAL MG/DL

## 2024-10-02 PROCEDURE — G8399 PT W/DXA RESULTS DOCUMENT: HCPCS | Performed by: STUDENT IN AN ORGANIZED HEALTH CARE EDUCATION/TRAINING PROGRAM

## 2024-10-02 PROCEDURE — 81001 URINALYSIS AUTO W/SCOPE: CPT | Performed by: STUDENT IN AN ORGANIZED HEALTH CARE EDUCATION/TRAINING PROGRAM

## 2024-10-02 PROCEDURE — 99213 OFFICE O/P EST LOW 20 MIN: CPT | Performed by: STUDENT IN AN ORGANIZED HEALTH CARE EDUCATION/TRAINING PROGRAM

## 2024-10-02 PROCEDURE — 1036F TOBACCO NON-USER: CPT | Performed by: STUDENT IN AN ORGANIZED HEALTH CARE EDUCATION/TRAINING PROGRAM

## 2024-10-02 PROCEDURE — 1090F PRES/ABSN URINE INCON ASSESS: CPT | Performed by: STUDENT IN AN ORGANIZED HEALTH CARE EDUCATION/TRAINING PROGRAM

## 2024-10-02 PROCEDURE — G8419 CALC BMI OUT NRM PARAM NOF/U: HCPCS | Performed by: STUDENT IN AN ORGANIZED HEALTH CARE EDUCATION/TRAINING PROGRAM

## 2024-10-02 PROCEDURE — G8427 DOCREV CUR MEDS BY ELIG CLIN: HCPCS | Performed by: STUDENT IN AN ORGANIZED HEALTH CARE EDUCATION/TRAINING PROGRAM

## 2024-10-02 PROCEDURE — 1123F ACP DISCUSS/DSCN MKR DOCD: CPT | Performed by: STUDENT IN AN ORGANIZED HEALTH CARE EDUCATION/TRAINING PROGRAM

## 2024-10-02 PROCEDURE — G8484 FLU IMMUNIZE NO ADMIN: HCPCS | Performed by: STUDENT IN AN ORGANIZED HEALTH CARE EDUCATION/TRAINING PROGRAM

## 2024-10-02 NOTE — PROGRESS NOTES
Kristi Boo is a 79 y.o. year old female who presents today for   Chief Complaint   Patient presents with    Other     6 WK UTI F/U          \"Have you been to the ER, urgent care clinic since your last visit?  Hospitalized since your last visit?\"   NO     “Have you seen or consulted any other health care providers outside our system since your last visit?”   NO             - IVANIA Santana  [facility]  Phone: [phone]  Fax: [phone]  
daily (Patient taking differently: 2 sprays by Nasal route daily as needed) 16 g 2    ALPRAZolam (XANAX) 0.25 MG tablet TAKE 1 TABLET BY MOUTH EVERY DAY AS NEEDED FOR ANXIETY      Cholecalciferol 50 MCG (2000 UT) CAPS Take by mouth      cyanocobalamin 1000 MCG tablet Take by mouth      loratadine (CLARITIN) 10 MG tablet Take by mouth daily as needed      ketorolac (ACULAR) 0.5 % ophthalmic solution  (Patient not taking: Reported on 8/23/2024)       No current facility-administered medications for this visit.        ROS   Review of Systems   Constitutional:  Negative for activity change and appetite change.   HENT:  Negative for congestion, drooling, ear discharge, ear pain and facial swelling.    Eyes:  Negative for pain, discharge, redness and itching.   Respiratory:  Negative for shortness of breath.    Cardiovascular:  Negative for leg swelling.   Gastrointestinal:  Negative for abdominal pain, constipation, diarrhea and vomiting.   Genitourinary:  Negative for difficulty urinating, frequency, hematuria and urgency.   Musculoskeletal:  Negative for arthralgias, back pain, myalgias and neck pain.   Skin:  Negative for color change.   Neurological:  Negative for dizziness, seizures, numbness and headaches.   Psychiatric/Behavioral:  Negative for agitation, behavioral problems, decreased concentration, sleep disturbance and suicidal ideas.              Objective:  Vitals:    10/02/24 1143   BP: (!) 149/81   Site: Left Upper Arm   Position: Sitting   Cuff Size: Small Adult   Pulse: 67   Resp: 14   Temp: 98 °F (36.7 °C)   TempSrc: Temporal   SpO2: 94%   Weight: 61.2 kg (135 lb)   Height: 1.524 m (5')         Physical Exam  Vitals reviewed.   Constitutional:       Appearance: She is normal weight.   HENT:      Head: Normocephalic.      Nose: No congestion or rhinorrhea.   Eyes:      General: No scleral icterus.        Right eye: No discharge.         Left eye: No discharge.   Cardiovascular:      Rate and Rhythm:

## 2024-10-08 RX ORDER — FLUTICASONE PROPIONATE 50 MCG
2 SPRAY, SUSPENSION (ML) NASAL DAILY PRN
Qty: 16 G | Refills: 1 | Status: SHIPPED | OUTPATIENT
Start: 2024-10-08

## 2024-10-08 ASSESSMENT — ENCOUNTER SYMPTOMS
VOMITING: 0
EYE REDNESS: 0
FACIAL SWELLING: 0
BACK PAIN: 0
EYE ITCHING: 0
EYE PAIN: 0
ABDOMINAL PAIN: 0
DIARRHEA: 0
COLOR CHANGE: 0
EYE DISCHARGE: 0
CONSTIPATION: 0
SHORTNESS OF BREATH: 0

## 2024-10-08 NOTE — TELEPHONE ENCOUNTER
Medication(s) requesting:   Requested Prescriptions     Pending Prescriptions Disp Refills    fluticasone (FLONASE) 50 MCG/ACT nasal spray [Pharmacy Med Name: FLUTICASONE 50MCG NASAL SP (120) RX] 16 g 2     Sig: SHAKE LIQUID AND USE 2 SPRAYS IN EACH NOSTRIL DAILY       Last office visit:  10/02/2024  Next office visit DMA: 11/19/2024

## 2024-11-19 ENCOUNTER — LAB (OUTPATIENT)
Facility: CLINIC | Age: 79
End: 2024-11-19

## 2024-11-19 ENCOUNTER — HOSPITAL ENCOUNTER (OUTPATIENT)
Facility: HOSPITAL | Age: 79
Setting detail: SPECIMEN
Discharge: HOME | End: 2024-11-22
Payer: MEDICARE

## 2024-11-19 DIAGNOSIS — E78.00 HYPERCHOLESTEREMIA: ICD-10-CM

## 2024-11-19 LAB
ALBUMIN SERPL-MCNC: 3.7 G/DL (ref 3.4–5)
ALBUMIN/GLOB SERPL: 1 (ref 0.8–1.7)
ALP SERPL-CCNC: 71 U/L (ref 45–117)
ALT SERPL-CCNC: 22 U/L (ref 13–56)
ANION GAP SERPL CALC-SCNC: 5 MMOL/L (ref 3–18)
APPEARANCE UR: ABNORMAL
AST SERPL-CCNC: 17 U/L (ref 10–38)
BACTERIA URNS QL MICRO: ABNORMAL /HPF
BILIRUB SERPL-MCNC: 1.4 MG/DL (ref 0.2–1)
BILIRUB UR QL: NEGATIVE
BUN SERPL-MCNC: 18 MG/DL (ref 7–18)
BUN/CREAT SERPL: 24 (ref 12–20)
CALCIUM SERPL-MCNC: 9.3 MG/DL (ref 8.5–10.1)
CAOX CRY URNS QL MICRO: ABNORMAL
CHLORIDE SERPL-SCNC: 106 MMOL/L (ref 100–111)
CHOLEST SERPL-MCNC: 221 MG/DL
CO2 SERPL-SCNC: 28 MMOL/L (ref 21–32)
COLOR UR: YELLOW
CREAT SERPL-MCNC: 0.76 MG/DL (ref 0.6–1.3)
EPITH CASTS URNS QL MICRO: ABNORMAL /LPF (ref 0–5)
ERYTHROCYTE [DISTWIDTH] IN BLOOD BY AUTOMATED COUNT: 13.3 % (ref 11.6–14.5)
GLOBULIN SER CALC-MCNC: 3.8 G/DL (ref 2–4)
GLUCOSE SERPL-MCNC: 76 MG/DL (ref 74–99)
GLUCOSE UR STRIP.AUTO-MCNC: NEGATIVE MG/DL
HCT VFR BLD AUTO: 43.5 % (ref 35–45)
HDLC SERPL-MCNC: 81 MG/DL (ref 40–60)
HDLC SERPL: 2.7 (ref 0–5)
HGB BLD-MCNC: 13.7 G/DL (ref 12–16)
HGB UR QL STRIP: ABNORMAL
KETONES UR QL STRIP.AUTO: NEGATIVE MG/DL
LDLC SERPL CALC-MCNC: 115.2 MG/DL (ref 0–100)
LEUKOCYTE ESTERASE UR QL STRIP.AUTO: ABNORMAL
LIPID PANEL: ABNORMAL
MCH RBC QN AUTO: 29.9 PG (ref 24–34)
MCHC RBC AUTO-ENTMCNC: 31.5 G/DL (ref 31–37)
MCV RBC AUTO: 95 FL (ref 78–100)
MUCOUS THREADS URNS QL MICRO: ABNORMAL /LPF
NITRITE UR QL STRIP.AUTO: POSITIVE
NRBC # BLD: 0 K/UL (ref 0–0.01)
NRBC BLD-RTO: 0 PER 100 WBC
PH UR STRIP: 6.5 (ref 5–8)
PLATELET # BLD AUTO: 195 K/UL (ref 135–420)
PMV BLD AUTO: 10.9 FL (ref 9.2–11.8)
POTASSIUM SERPL-SCNC: 4.5 MMOL/L (ref 3.5–5.5)
PROT SERPL-MCNC: 7.5 G/DL (ref 6.4–8.2)
PROT UR STRIP-MCNC: ABNORMAL MG/DL
RBC # BLD AUTO: 4.58 M/UL (ref 4.2–5.3)
RBC #/AREA URNS HPF: ABNORMAL /HPF (ref 0–5)
SODIUM SERPL-SCNC: 139 MMOL/L (ref 136–145)
SP GR UR REFRACTOMETRY: 1.02 (ref 1–1.03)
TRIGL SERPL-MCNC: 124 MG/DL
TSH SERPL DL<=0.05 MIU/L-ACNC: 0.73 UIU/ML (ref 0.36–3.74)
UROBILINOGEN UR QL STRIP.AUTO: 0.2 EU/DL (ref 0.2–1)
VLDLC SERPL CALC-MCNC: 24.8 MG/DL
WBC # BLD AUTO: 5.2 K/UL (ref 4.6–13.2)
WBC URNS QL MICRO: ABNORMAL /HPF (ref 0–4)

## 2024-11-19 PROCEDURE — 81001 URINALYSIS AUTO W/SCOPE: CPT

## 2024-11-19 PROCEDURE — 85027 COMPLETE CBC AUTOMATED: CPT

## 2024-11-19 PROCEDURE — 36415 COLL VENOUS BLD VENIPUNCTURE: CPT

## 2024-11-19 PROCEDURE — 80061 LIPID PANEL: CPT

## 2024-11-19 PROCEDURE — 80053 COMPREHEN METABOLIC PANEL: CPT

## 2024-11-19 PROCEDURE — 84443 ASSAY THYROID STIM HORMONE: CPT

## 2024-12-03 ENCOUNTER — OFFICE VISIT (OUTPATIENT)
Facility: CLINIC | Age: 79
End: 2024-12-03

## 2024-12-03 VITALS
DIASTOLIC BLOOD PRESSURE: 72 MMHG | OXYGEN SATURATION: 97 % | TEMPERATURE: 97.5 F | RESPIRATION RATE: 14 BRPM | HEART RATE: 62 BPM | HEIGHT: 60 IN | WEIGHT: 137.2 LBS | SYSTOLIC BLOOD PRESSURE: 131 MMHG | BODY MASS INDEX: 26.93 KG/M2

## 2024-12-03 DIAGNOSIS — F41.3 OTHER MIXED ANXIETY DISORDERS: ICD-10-CM

## 2024-12-03 DIAGNOSIS — N39.0 RECURRENT UTI: ICD-10-CM

## 2024-12-03 DIAGNOSIS — Z00.00 MEDICARE ANNUAL WELLNESS VISIT, SUBSEQUENT: Primary | ICD-10-CM

## 2024-12-03 RX ORDER — CIPROFLOXACIN 250 MG/1
250 TABLET, FILM COATED ORAL 2 TIMES DAILY
Qty: 6 TABLET | Refills: 0 | Status: SHIPPED | OUTPATIENT
Start: 2024-12-03 | End: 2024-12-06

## 2024-12-03 RX ORDER — ALPRAZOLAM 0.25 MG/1
0.25 TABLET ORAL 3 TIMES DAILY PRN
Qty: 30 TABLET | Refills: 0 | Status: SHIPPED | OUTPATIENT
Start: 2024-12-03 | End: 2025-01-02

## 2024-12-03 ASSESSMENT — PATIENT HEALTH QUESTIONNAIRE - PHQ9
4. FEELING TIRED OR HAVING LITTLE ENERGY: NOT AT ALL
3. TROUBLE FALLING OR STAYING ASLEEP: NOT AT ALL
10. IF YOU CHECKED OFF ANY PROBLEMS, HOW DIFFICULT HAVE THESE PROBLEMS MADE IT FOR YOU TO DO YOUR WORK, TAKE CARE OF THINGS AT HOME, OR GET ALONG WITH OTHER PEOPLE: NOT DIFFICULT AT ALL
SUM OF ALL RESPONSES TO PHQ QUESTIONS 1-9: 0
7. TROUBLE CONCENTRATING ON THINGS, SUCH AS READING THE NEWSPAPER OR WATCHING TELEVISION: NOT AT ALL
SUM OF ALL RESPONSES TO PHQ QUESTIONS 1-9: 0
SUM OF ALL RESPONSES TO PHQ QUESTIONS 1-9: 0
2. FEELING DOWN, DEPRESSED OR HOPELESS: NOT AT ALL
SUM OF ALL RESPONSES TO PHQ9 QUESTIONS 1 & 2: 0
SUM OF ALL RESPONSES TO PHQ QUESTIONS 1-9: 0
1. LITTLE INTEREST OR PLEASURE IN DOING THINGS: NOT AT ALL
5. POOR APPETITE OR OVEREATING: NOT AT ALL
9. THOUGHTS THAT YOU WOULD BE BETTER OFF DEAD, OR OF HURTING YOURSELF: NOT AT ALL
8. MOVING OR SPEAKING SO SLOWLY THAT OTHER PEOPLE COULD HAVE NOTICED. OR THE OPPOSITE, BEING SO FIGETY OR RESTLESS THAT YOU HAVE BEEN MOVING AROUND A LOT MORE THAN USUAL: NOT AT ALL
6. FEELING BAD ABOUT YOURSELF - OR THAT YOU ARE A FAILURE OR HAVE LET YOURSELF OR YOUR FAMILY DOWN: NOT AT ALL

## 2024-12-03 ASSESSMENT — ENCOUNTER SYMPTOMS
ABDOMINAL PAIN: 0
FACIAL SWELLING: 0
EYE PAIN: 0
EYE ITCHING: 0
EYE REDNESS: 0
BACK PAIN: 0
CONSTIPATION: 0
VOMITING: 0
EYE DISCHARGE: 0
SHORTNESS OF BREATH: 0
COLOR CHANGE: 0
DIARRHEA: 0

## 2024-12-03 NOTE — PROGRESS NOTES
Kristi Boo is a 79 y.o. year old female who presents today for   Chief Complaint   Patient presents with    Medicare AWV        \"Have you been to the ER, urgent care clinic since your last visit?  Hospitalized since your last visit?\"   NO     “Have you seen or consulted any other health care providers outside our system since your last visit?”   NO             - IVANIA Santana  [facility]  Phone: [phone]  Fax: [phone]

## 2024-12-03 NOTE — PROGRESS NOTES
Kristi Boo is a 79 y.o.  female and presents with    Chief Complaint   Patient presents with    Medicare AWV           Subjective:    Has hx of chronic UTIs. She thinks it could be d/t pessary. She has been using when she remembers the estrace cream    Pt also needs a refill in her Xanax. She states she uses this as needed. She uses this PRN.     Patient Active Problem List   Diagnosis    Senile osteopenia    Uterine prolapse    Cystocele, midline    Hypercholesteremia    Recurrent UTI    Anxiety    Recurrent depression (HCC)      Past Medical History:   Diagnosis Date    Allergic rhinitis     Anxiety     Arthropathy     Atrophic vaginitis     Back pain     Chest pain, atypical     COVID-19 01/2022    Cystocele     Depression     Flank pain     Heel pain     History of methicillin resistant staphylococcus aureus (MRSA)     in urine  pt states was yrs ago    HTN (hypertension)     Controlled    Hypercholesteremia     Hyperlipidemia     Leg pain     Menopause     Metabolic disease     Mitral valve prolapse     with mitral regurgitation    Osteopenia     Recurrent UTI     Uterine prolapse     UTI (urinary tract infection)       Past Surgical History:   Procedure Laterality Date    BREAST BIOPSY Left ?2010    Benign    CATARACT EXTRACTION W/ INTRAOCULAR LENS IMPLANT Left 09/10/2024    LEFT CATARACT REMOVAL WITH INTRAOCULAR LENS IMPLANT    CATARACT REMOVAL Right 2014      Family History   Problem Relation Age of Onset    Heart Disease Sister     Heart Disease Brother     Heart Disease Mother     Cancer Father      Social History     Socioeconomic History    Marital status:      Spouse name: Not on file    Number of children: Not on file    Years of education: Not on file    Highest education level: Not on file   Occupational History    Not on file   Tobacco Use    Smoking status: Never    Smokeless tobacco: Never   Vaping Use    Vaping status: Never Used   Substance and Sexual Activity    Alcohol use:

## 2024-12-26 ENCOUNTER — TELEPHONE (OUTPATIENT)
Facility: CLINIC | Age: 79
End: 2024-12-26

## 2024-12-26 DIAGNOSIS — B37.9 YEAST INFECTION: Primary | ICD-10-CM

## 2024-12-26 RX ORDER — FLUCONAZOLE 150 MG/1
150 TABLET ORAL ONCE
Qty: 1 TABLET | Refills: 0 | Status: SHIPPED | OUTPATIENT
Start: 2024-12-26 | End: 2024-12-26

## 2024-12-26 NOTE — TELEPHONE ENCOUNTER
Patient called in stating she is currently experiencing a yeast infection. Patent will like to be prescribed the medication that you prescribed to her before. However, she is unaware of the name of the medication. Please be advised, thank you.

## 2025-02-14 DIAGNOSIS — F41.3 OTHER MIXED ANXIETY DISORDERS: ICD-10-CM

## 2025-02-14 NOTE — TELEPHONE ENCOUNTER
Medication(s) requesting:   Requested Prescriptions     Pending Prescriptions Disp Refills    ALPRAZolam (XANAX) 0.25 MG tablet [Pharmacy Med Name: ALPRAZOLAM 0.25MG TABLETS] 30 tablet      Sig: TAKE 1 TABLET BY MOUTH THREE TIMES DAILY AS NEEDED FOR ANXIETY FOR UP TO 30 DAYS       Last office visit:  12/03/2024  Next office visit DMA: 3/3/2025

## 2025-02-18 RX ORDER — ALPRAZOLAM 0.25 MG
TABLET ORAL
Qty: 30 TABLET | OUTPATIENT
Start: 2025-02-18

## 2025-03-03 ENCOUNTER — OFFICE VISIT (OUTPATIENT)
Facility: CLINIC | Age: 80
End: 2025-03-03

## 2025-03-03 ENCOUNTER — HOSPITAL ENCOUNTER (OUTPATIENT)
Facility: HOSPITAL | Age: 80
Setting detail: SPECIMEN
Discharge: HOME | End: 2025-03-06
Payer: MEDICARE

## 2025-03-03 VITALS
HEIGHT: 60 IN | RESPIRATION RATE: 14 BRPM | SYSTOLIC BLOOD PRESSURE: 158 MMHG | WEIGHT: 135.4 LBS | OXYGEN SATURATION: 95 % | HEART RATE: 71 BPM | BODY MASS INDEX: 26.58 KG/M2 | DIASTOLIC BLOOD PRESSURE: 78 MMHG | TEMPERATURE: 98.1 F

## 2025-03-03 DIAGNOSIS — N39.0 RECURRENT UTI: ICD-10-CM

## 2025-03-03 DIAGNOSIS — I10 ESSENTIAL HYPERTENSION: Primary | ICD-10-CM

## 2025-03-03 LAB
ALBUMIN SERPL-MCNC: 3.9 G/DL (ref 3.4–5)
ALBUMIN/GLOB SERPL: 1.1 (ref 0.8–1.7)
ALP SERPL-CCNC: 76 U/L (ref 45–117)
ALT SERPL-CCNC: 24 U/L (ref 13–56)
ANION GAP SERPL CALC-SCNC: 5 MMOL/L (ref 3–18)
APPEARANCE UR: ABNORMAL
AST SERPL-CCNC: 20 U/L (ref 10–38)
BACTERIA URNS QL MICRO: ABNORMAL /HPF
BASOPHILS # BLD: 0.04 K/UL (ref 0–0.1)
BASOPHILS NFR BLD: 0.6 % (ref 0–2)
BILIRUB SERPL-MCNC: 1.6 MG/DL (ref 0.2–1)
BILIRUB UR QL: NEGATIVE
BUN SERPL-MCNC: 19 MG/DL (ref 7–18)
BUN/CREAT SERPL: 25 (ref 12–20)
CALCIUM SERPL-MCNC: 9.3 MG/DL (ref 8.5–10.1)
CHLORIDE SERPL-SCNC: 105 MMOL/L (ref 100–111)
CO2 SERPL-SCNC: 28 MMOL/L (ref 21–32)
COLOR UR: YELLOW
CREAT SERPL-MCNC: 0.75 MG/DL (ref 0.6–1.3)
DIFFERENTIAL METHOD BLD: ABNORMAL
EOSINOPHIL # BLD: 0.22 K/UL (ref 0–0.4)
EOSINOPHIL NFR BLD: 3.1 % (ref 0–5)
EPITH CASTS URNS QL MICRO: ABNORMAL /LPF (ref 0–5)
ERYTHROCYTE [DISTWIDTH] IN BLOOD BY AUTOMATED COUNT: 13.3 % (ref 11.6–14.5)
GLOBULIN SER CALC-MCNC: 3.6 G/DL (ref 2–4)
GLUCOSE SERPL-MCNC: 85 MG/DL (ref 74–99)
GLUCOSE UR STRIP.AUTO-MCNC: NEGATIVE MG/DL
HCT VFR BLD AUTO: 46.3 % (ref 35–45)
HGB BLD-MCNC: 14.4 G/DL (ref 12–16)
HGB UR QL STRIP: ABNORMAL
IMM GRANULOCYTES # BLD AUTO: 0.02 K/UL (ref 0–0.04)
IMM GRANULOCYTES NFR BLD AUTO: 0.3 % (ref 0–0.5)
KETONES UR QL STRIP.AUTO: NEGATIVE MG/DL
LEUKOCYTE ESTERASE UR QL STRIP.AUTO: ABNORMAL
LYMPHOCYTES # BLD: 1.29 K/UL (ref 0.9–3.6)
LYMPHOCYTES NFR BLD: 18.1 % (ref 21–52)
MCH RBC QN AUTO: 29.3 PG (ref 24–34)
MCHC RBC AUTO-ENTMCNC: 31.1 G/DL (ref 31–37)
MCV RBC AUTO: 94.3 FL (ref 78–100)
MONOCYTES # BLD: 0.55 K/UL (ref 0.05–1.2)
MONOCYTES NFR BLD: 7.7 % (ref 3–10)
NEUTS SEG # BLD: 5 K/UL (ref 1.8–8)
NEUTS SEG NFR BLD: 70.2 % (ref 40–73)
NITRITE UR QL STRIP.AUTO: POSITIVE
NRBC # BLD: 0 K/UL (ref 0–0.01)
NRBC BLD-RTO: 0 PER 100 WBC
PH UR STRIP: 6.5 (ref 5–8)
PLATELET # BLD AUTO: 213 K/UL (ref 135–420)
PMV BLD AUTO: 11.5 FL (ref 9.2–11.8)
POTASSIUM SERPL-SCNC: 4.5 MMOL/L (ref 3.5–5.5)
PROT SERPL-MCNC: 7.5 G/DL (ref 6.4–8.2)
PROT UR STRIP-MCNC: NEGATIVE MG/DL
RBC # BLD AUTO: 4.91 M/UL (ref 4.2–5.3)
RBC #/AREA URNS HPF: ABNORMAL /HPF (ref 0–5)
SODIUM SERPL-SCNC: 138 MMOL/L (ref 136–145)
SP GR UR REFRACTOMETRY: 1.02 (ref 1–1.03)
UROBILINOGEN UR QL STRIP.AUTO: 0.2 EU/DL (ref 0.2–1)
WBC # BLD AUTO: 7.1 K/UL (ref 4.6–13.2)
WBC URNS QL MICRO: ABNORMAL /HPF (ref 0–5)

## 2025-03-03 PROCEDURE — 85025 COMPLETE CBC W/AUTO DIFF WBC: CPT

## 2025-03-03 PROCEDURE — 87088 URINE BACTERIA CULTURE: CPT

## 2025-03-03 PROCEDURE — 80053 COMPREHEN METABOLIC PANEL: CPT

## 2025-03-03 PROCEDURE — 81001 URINALYSIS AUTO W/SCOPE: CPT

## 2025-03-03 PROCEDURE — 36415 COLL VENOUS BLD VENIPUNCTURE: CPT

## 2025-03-03 PROCEDURE — 87186 SC STD MICRODIL/AGAR DIL: CPT

## 2025-03-03 PROCEDURE — 87086 URINE CULTURE/COLONY COUNT: CPT

## 2025-03-03 RX ORDER — AMLODIPINE BESYLATE 2.5 MG/1
2.5 TABLET ORAL DAILY
Qty: 90 TABLET | Refills: 1 | Status: SHIPPED
Start: 2025-03-03

## 2025-03-03 SDOH — ECONOMIC STABILITY: FOOD INSECURITY: WITHIN THE PAST 12 MONTHS, THE FOOD YOU BOUGHT JUST DIDN'T LAST AND YOU DIDN'T HAVE MONEY TO GET MORE.: NEVER TRUE

## 2025-03-03 SDOH — ECONOMIC STABILITY: FOOD INSECURITY: WITHIN THE PAST 12 MONTHS, YOU WORRIED THAT YOUR FOOD WOULD RUN OUT BEFORE YOU GOT MONEY TO BUY MORE.: NEVER TRUE

## 2025-03-03 ASSESSMENT — PATIENT HEALTH QUESTIONNAIRE - PHQ9
10. IF YOU CHECKED OFF ANY PROBLEMS, HOW DIFFICULT HAVE THESE PROBLEMS MADE IT FOR YOU TO DO YOUR WORK, TAKE CARE OF THINGS AT HOME, OR GET ALONG WITH OTHER PEOPLE: NOT DIFFICULT AT ALL
9. THOUGHTS THAT YOU WOULD BE BETTER OFF DEAD, OR OF HURTING YOURSELF: NOT AT ALL
3. TROUBLE FALLING OR STAYING ASLEEP: NOT AT ALL
7. TROUBLE CONCENTRATING ON THINGS, SUCH AS READING THE NEWSPAPER OR WATCHING TELEVISION: NOT AT ALL
5. POOR APPETITE OR OVEREATING: NOT AT ALL
SUM OF ALL RESPONSES TO PHQ QUESTIONS 1-9: 0
2. FEELING DOWN, DEPRESSED OR HOPELESS: NOT AT ALL
6. FEELING BAD ABOUT YOURSELF - OR THAT YOU ARE A FAILURE OR HAVE LET YOURSELF OR YOUR FAMILY DOWN: NOT AT ALL
1. LITTLE INTEREST OR PLEASURE IN DOING THINGS: NOT AT ALL
4. FEELING TIRED OR HAVING LITTLE ENERGY: NOT AT ALL
SUM OF ALL RESPONSES TO PHQ QUESTIONS 1-9: 0
8. MOVING OR SPEAKING SO SLOWLY THAT OTHER PEOPLE COULD HAVE NOTICED. OR THE OPPOSITE, BEING SO FIGETY OR RESTLESS THAT YOU HAVE BEEN MOVING AROUND A LOT MORE THAN USUAL: NOT AT ALL

## 2025-03-03 NOTE — PROGRESS NOTES
Kristi Boo is a 79 y.o.  female and presents with    Chief Complaint   Patient presents with    3 Month Follow-Up           Subjective:      Pt states she has been having some similar sx to the ones she gets when she has a UTIs.    Last time HTN medication was dc, pt states she has not been checking her Bps. Denies sx of HTN.    Patient Active Problem List   Diagnosis    Senile osteopenia    Uterine prolapse    Cystocele, midline    Hypercholesteremia    Recurrent UTI    Anxiety    Recurrent depression      Past Medical History:   Diagnosis Date    Allergic rhinitis     Anxiety     Arthropathy     Atrophic vaginitis     Back pain     Chest pain, atypical     COVID-19 01/2022    Cystocele     Depression     Flank pain     Heel pain     History of methicillin resistant staphylococcus aureus (MRSA)     in urine  pt states was yrs ago    HTN (hypertension)     Controlled    Hypercholesteremia     Hyperlipidemia     Leg pain     Menopause     Metabolic disease     Mitral valve prolapse     with mitral regurgitation    Osteopenia     Recurrent UTI     Uterine prolapse     UTI (urinary tract infection)       Past Surgical History:   Procedure Laterality Date    BREAST BIOPSY Left ?2010    Benign    CATARACT EXTRACTION W/ INTRAOCULAR LENS IMPLANT Left 09/10/2024    LEFT CATARACT REMOVAL WITH INTRAOCULAR LENS IMPLANT    CATARACT REMOVAL Right 2014      Family History   Problem Relation Age of Onset    Heart Disease Sister     Heart Disease Brother     Heart Disease Mother     Cancer Father      Social History     Socioeconomic History    Marital status:      Spouse name: Not on file    Number of children: Not on file    Years of education: Not on file    Highest education level: Not on file   Occupational History    Not on file   Tobacco Use    Smoking status: Never    Smokeless tobacco: Never   Vaping Use    Vaping status: Never Used   Substance and Sexual Activity    Alcohol use: No     Alcohol/week: 0.0

## 2025-03-03 NOTE — PROGRESS NOTES
Kristi Boo is a 79 y.o. year old female who presents today for   Chief Complaint   Patient presents with    3 Month Follow-Up        \"Have you been to the ER, urgent care clinic since your last visit?  Hospitalized since your last visit?\"   NO     “Have you seen or consulted any other health care providers outside our system since your last visit?”   NO             - IVANIA Santana  [facility]  Phone: [phone]  Fax: [phone]

## 2025-03-05 LAB
BACTERIA SPEC CULT: ABNORMAL
CC UR VC: ABNORMAL
SERVICE CMNT-IMP: ABNORMAL

## 2025-03-06 DIAGNOSIS — F41.3 OTHER MIXED ANXIETY DISORDERS: ICD-10-CM

## 2025-03-06 RX ORDER — SERTRALINE HYDROCHLORIDE 100 MG/1
100 TABLET, FILM COATED ORAL DAILY
Qty: 90 TABLET | Refills: 1 | Status: SHIPPED | OUTPATIENT
Start: 2025-03-06

## 2025-03-06 RX ORDER — CIPROFLOXACIN 500 MG/1
500 TABLET, FILM COATED ORAL 2 TIMES DAILY
Qty: 10 TABLET | Refills: 0 | Status: SHIPPED | OUTPATIENT
Start: 2025-03-06 | End: 2025-03-11

## 2025-03-06 NOTE — TELEPHONE ENCOUNTER
Medication(s) requesting:   Requested Prescriptions     Pending Prescriptions Disp Refills    sertraline (ZOLOFT) 100 MG tablet 90 tablet 1     Sig: Take 1 tablet by mouth daily       Last office visit:  3/3/2025  Next office visit DMA: 3/17/2025

## 2025-03-15 ASSESSMENT — ENCOUNTER SYMPTOMS
VOMITING: 0
EYE PAIN: 0
BACK PAIN: 0
DIARRHEA: 0
CONSTIPATION: 0
EYE REDNESS: 0
ABDOMINAL PAIN: 0
COLOR CHANGE: 0
SHORTNESS OF BREATH: 0
FACIAL SWELLING: 0
EYE ITCHING: 0
EYE DISCHARGE: 0

## 2025-03-17 ENCOUNTER — OFFICE VISIT (OUTPATIENT)
Facility: CLINIC | Age: 80
End: 2025-03-17
Payer: MEDICARE

## 2025-03-17 VITALS — DIASTOLIC BLOOD PRESSURE: 70 MMHG | HEART RATE: 72 BPM | OXYGEN SATURATION: 95 % | SYSTOLIC BLOOD PRESSURE: 112 MMHG

## 2025-03-17 DIAGNOSIS — R82.90 URINE ABNORMALITY: Primary | ICD-10-CM

## 2025-03-17 LAB
BILIRUBIN, URINE, POC: NEGATIVE
BLOOD URINE, POC: NORMAL
GLUCOSE URINE, POC: NEGATIVE
KETONES, URINE, POC: NEGATIVE
LEUKOCYTE ESTERASE, URINE, POC: NORMAL
NITRITE, URINE, POC: NEGATIVE
PH, URINE, POC: 6.5 (ref 4.6–8)
PROTEIN,URINE, POC: NORMAL
SPECIFIC GRAVITY, URINE, POC: 1.02 (ref 1–1.03)
URINALYSIS CLARITY, POC: CLEAR
URINALYSIS COLOR, POC: YELLOW
UROBILINOGEN, POC: NORMAL MG/DL

## 2025-03-17 PROCEDURE — 81001 URINALYSIS AUTO W/SCOPE: CPT | Performed by: STUDENT IN AN ORGANIZED HEALTH CARE EDUCATION/TRAINING PROGRAM

## 2025-03-17 RX ORDER — CIPROFLOXACIN 500 MG/1
500 TABLET, FILM COATED ORAL 2 TIMES DAILY
Qty: 14 TABLET | Refills: 0 | Status: SHIPPED | OUTPATIENT
Start: 2025-03-17 | End: 2025-03-24

## 2025-03-17 NOTE — PROGRESS NOTES
Kristi Boo is being seen today for a Blood Pressure Recheck.     Kristi Boo was seen 3/3/2025 and her Blood Pressure was:   BP Readings from Last 1 Encounters:   03/17/25 112/70     Dr. Edmondson made aware        Doris Evangelista

## 2025-03-17 NOTE — PROGRESS NOTES
D/t pt still being symptomatic from UTI after abx. After reviewing the culture will give a more prolonged dose of cipro that showed to be sensitive.

## 2025-04-29 ENCOUNTER — OFFICE VISIT (OUTPATIENT)
Facility: CLINIC | Age: 80
End: 2025-04-29
Payer: MEDICARE

## 2025-04-29 VITALS
RESPIRATION RATE: 20 BRPM | OXYGEN SATURATION: 94 % | HEIGHT: 60 IN | DIASTOLIC BLOOD PRESSURE: 76 MMHG | WEIGHT: 133.8 LBS | TEMPERATURE: 97.9 F | HEART RATE: 74 BPM | BODY MASS INDEX: 26.27 KG/M2 | SYSTOLIC BLOOD PRESSURE: 138 MMHG

## 2025-04-29 DIAGNOSIS — I10 ESSENTIAL HYPERTENSION: ICD-10-CM

## 2025-04-29 DIAGNOSIS — E78.00 HYPERCHOLESTEREMIA: ICD-10-CM

## 2025-04-29 DIAGNOSIS — N39.0 RECURRENT UTI: Primary | ICD-10-CM

## 2025-04-29 PROCEDURE — 3075F SYST BP GE 130 - 139MM HG: CPT | Performed by: FAMILY MEDICINE

## 2025-04-29 PROCEDURE — 1159F MED LIST DOCD IN RCRD: CPT | Performed by: FAMILY MEDICINE

## 2025-04-29 PROCEDURE — 99214 OFFICE O/P EST MOD 30 MIN: CPT | Performed by: FAMILY MEDICINE

## 2025-04-29 PROCEDURE — 3078F DIAST BP <80 MM HG: CPT | Performed by: FAMILY MEDICINE

## 2025-04-29 PROCEDURE — G8399 PT W/DXA RESULTS DOCUMENT: HCPCS | Performed by: FAMILY MEDICINE

## 2025-04-29 PROCEDURE — G8427 DOCREV CUR MEDS BY ELIG CLIN: HCPCS | Performed by: FAMILY MEDICINE

## 2025-04-29 PROCEDURE — G2211 COMPLEX E/M VISIT ADD ON: HCPCS | Performed by: FAMILY MEDICINE

## 2025-04-29 PROCEDURE — 1123F ACP DISCUSS/DSCN MKR DOCD: CPT | Performed by: FAMILY MEDICINE

## 2025-04-29 PROCEDURE — 1090F PRES/ABSN URINE INCON ASSESS: CPT | Performed by: FAMILY MEDICINE

## 2025-04-29 PROCEDURE — 1036F TOBACCO NON-USER: CPT | Performed by: FAMILY MEDICINE

## 2025-04-29 PROCEDURE — 1160F RVW MEDS BY RX/DR IN RCRD: CPT | Performed by: FAMILY MEDICINE

## 2025-04-29 PROCEDURE — G8419 CALC BMI OUT NRM PARAM NOF/U: HCPCS | Performed by: FAMILY MEDICINE

## 2025-04-29 RX ORDER — CEPHALEXIN 500 MG/1
500 CAPSULE ORAL 2 TIMES DAILY
COMMUNITY
Start: 2025-04-25 | End: 2025-04-29 | Stop reason: SINTOL

## 2025-04-29 RX ORDER — CIPROFLOXACIN 500 MG/1
500 TABLET, FILM COATED ORAL 2 TIMES DAILY
Qty: 14 TABLET | Refills: 0 | Status: SHIPPED | OUTPATIENT
Start: 2025-04-29 | End: 2025-05-06

## 2025-04-29 SDOH — ECONOMIC STABILITY: FOOD INSECURITY: WITHIN THE PAST 12 MONTHS, YOU WORRIED THAT YOUR FOOD WOULD RUN OUT BEFORE YOU GOT MONEY TO BUY MORE.: NEVER TRUE

## 2025-04-29 SDOH — ECONOMIC STABILITY: FOOD INSECURITY: WITHIN THE PAST 12 MONTHS, THE FOOD YOU BOUGHT JUST DIDN'T LAST AND YOU DIDN'T HAVE MONEY TO GET MORE.: NEVER TRUE

## 2025-04-29 ASSESSMENT — PATIENT HEALTH QUESTIONNAIRE - PHQ9
3. TROUBLE FALLING OR STAYING ASLEEP: NOT AT ALL
9. THOUGHTS THAT YOU WOULD BE BETTER OFF DEAD, OR OF HURTING YOURSELF: NOT AT ALL
SUM OF ALL RESPONSES TO PHQ QUESTIONS 1-9: 0
7. TROUBLE CONCENTRATING ON THINGS, SUCH AS READING THE NEWSPAPER OR WATCHING TELEVISION: NOT AT ALL
1. LITTLE INTEREST OR PLEASURE IN DOING THINGS: NOT AT ALL
SUM OF ALL RESPONSES TO PHQ QUESTIONS 1-9: 0
SUM OF ALL RESPONSES TO PHQ QUESTIONS 1-9: 0
5. POOR APPETITE OR OVEREATING: NOT AT ALL
6. FEELING BAD ABOUT YOURSELF - OR THAT YOU ARE A FAILURE OR HAVE LET YOURSELF OR YOUR FAMILY DOWN: NOT AT ALL
2. FEELING DOWN, DEPRESSED OR HOPELESS: NOT AT ALL
4. FEELING TIRED OR HAVING LITTLE ENERGY: NOT AT ALL
SUM OF ALL RESPONSES TO PHQ QUESTIONS 1-9: 0
10. IF YOU CHECKED OFF ANY PROBLEMS, HOW DIFFICULT HAVE THESE PROBLEMS MADE IT FOR YOU TO DO YOUR WORK, TAKE CARE OF THINGS AT HOME, OR GET ALONG WITH OTHER PEOPLE: NOT DIFFICULT AT ALL
8. MOVING OR SPEAKING SO SLOWLY THAT OTHER PEOPLE COULD HAVE NOTICED. OR THE OPPOSITE, BEING SO FIGETY OR RESTLESS THAT YOU HAVE BEEN MOVING AROUND A LOT MORE THAN USUAL: NOT AT ALL

## 2025-04-29 NOTE — PROGRESS NOTES
Kristi Boo is a 79 y.o.  female and presents with    Chief Complaint   Patient presents with    Medication Check     STOPPED TAKING ABX           Subjective:  Ms. Boo is following up for UTI after starting cephalexin 5 days ago.  She noted abdominal pain and diarrhea which start started  yesterday with worsening today.  She has stopped the cephalexin after last night's dose.          ROS   Constitutional:  Negative for activity change and appetite change.   HENT:  Negative for congestion, drooling, ear discharge, ear pain and facial swelling.    Eyes:  Negative for pain, discharge, redness and itching.   Respiratory:  Negative for shortness of breath.    Cardiovascular:  Negative for leg swelling.   Gastrointestinal:  see HPI  Genitourinary:  Positive for dysuria. Negative for difficulty urinating, frequency, hematuria and urgency.   Musculoskeletal:  Negative for arthralgias, back pain, myalgias and neck pain.   Skin:  Negative for color change.   Neurological:  Negative for dizziness, seizures, numbness and headaches.   Psychiatric/Behavioral:  Negative for agitation, behavioral problems, decreased concentration, sleep disturbance and suicidal ideas.       All other systems reviewed and are negative.    Objective:  Vitals:    04/29/25 1024   BP: 138/76   Pulse: 74   Resp: 20   Temp: 97.9 °F (36.6 °C)   SpO2: 94%     alert, well appearing, and in no distress and oriented to person, place, and time  Mental status - normal mood, behavior, speech, dress, motor activity, and thought processes  Chest - clear to auscultation, no wheezes, rales or rhonchi, symmetric air entry  Heart - normal rate and regular rhythm, 3/6 systolic ejection murmur noted, no gallops noted  Neurological - cranial nerves II through XII intact    LABS   3/3/2025 urine culture showed sensitivity to ciprofloxacin  TESTS      Assessment/Plan:    1. Recurrent UTI  Start abx; stop cephalexin due to side effects  - ciprofloxacin (CIPRO)

## 2025-04-29 NOTE — PROGRESS NOTES
Kristi Boo is a 79 y.o. year old female who presents today for   Chief Complaint   Patient presents with    Medication Check     STOPPED TAKING ABT        \"Have you been to the ER, urgent care clinic since your last visit?  Hospitalized since your last visit?\"   [facility]/ UTI     “Have you seen or consulted any other health care providers outside our system since your last visit?”   NO             Farheen Valladares  [facility]  Phone: [phone]  Fax: [phone]

## 2025-06-05 ENCOUNTER — OFFICE VISIT (OUTPATIENT)
Facility: CLINIC | Age: 80
End: 2025-06-05

## 2025-06-05 VITALS
RESPIRATION RATE: 15 BRPM | WEIGHT: 132.6 LBS | OXYGEN SATURATION: 94 % | HEART RATE: 74 BPM | HEIGHT: 60 IN | TEMPERATURE: 97.2 F | DIASTOLIC BLOOD PRESSURE: 72 MMHG | SYSTOLIC BLOOD PRESSURE: 118 MMHG | BODY MASS INDEX: 26.03 KG/M2

## 2025-06-05 DIAGNOSIS — H18.513 FUCHS' CORNEAL DYSTROPHY OF BOTH EYES: ICD-10-CM

## 2025-06-05 DIAGNOSIS — N39.0 RECURRENT UTI: Primary | ICD-10-CM

## 2025-06-05 DIAGNOSIS — I10 ESSENTIAL HYPERTENSION: ICD-10-CM

## 2025-06-05 DIAGNOSIS — F41.3 OTHER MIXED ANXIETY DISORDERS: ICD-10-CM

## 2025-06-05 LAB
BILIRUBIN, URINE, POC: NEGATIVE
BLOOD URINE, POC: NORMAL
GLUCOSE URINE, POC: NEGATIVE
KETONES, URINE, POC: NEGATIVE
LEUKOCYTE ESTERASE, URINE, POC: NORMAL
NITRITE, URINE, POC: NEGATIVE
PH, URINE, POC: 6 (ref 4.6–8)
PROTEIN,URINE, POC: NORMAL
SPECIFIC GRAVITY, URINE, POC: 1.03 (ref 1–1.03)
URINALYSIS CLARITY, POC: NORMAL
URINALYSIS COLOR, POC: YELLOW
UROBILINOGEN, POC: NORMAL MG/DL

## 2025-06-05 RX ORDER — CIPROFLOXACIN 250 MG/1
250 TABLET, FILM COATED ORAL 2 TIMES DAILY
Qty: 6 TABLET | Refills: 0 | Status: SHIPPED | OUTPATIENT
Start: 2025-06-05 | End: 2025-06-08

## 2025-06-05 RX ORDER — SERTRALINE HYDROCHLORIDE 100 MG/1
100 TABLET, FILM COATED ORAL DAILY
Qty: 90 TABLET | Refills: 3 | Status: SHIPPED | OUTPATIENT
Start: 2025-06-05

## 2025-06-05 RX ORDER — FLUCONAZOLE 150 MG/1
150 TABLET ORAL ONCE
Qty: 1 TABLET | Refills: 0 | Status: SHIPPED | OUTPATIENT
Start: 2025-06-05 | End: 2025-06-05

## 2025-06-05 ASSESSMENT — ENCOUNTER SYMPTOMS
DIARRHEA: 0
EYE ITCHING: 0
EYE REDNESS: 0
EYE PAIN: 0
COLOR CHANGE: 0
EYE DISCHARGE: 0
BACK PAIN: 0
VOMITING: 0
FACIAL SWELLING: 0
CONSTIPATION: 0
ABDOMINAL PAIN: 0
SHORTNESS OF BREATH: 0

## 2025-06-05 NOTE — PROGRESS NOTES
Kristi Boo is a 79 y.o. year old female who presents today for   Chief Complaint   Patient presents with    Hypertension     3 month follow up        \"Have you been to the ER, urgent care clinic since your last visit?  Hospitalized since your last visit?\"   NO     “Have you seen or consulted any other health care providers outside our system since your last visit?”   NO             - IVANIA Santana  [facility]  Phone: [phone]  Fax: [phone]

## 2025-06-05 NOTE — PROGRESS NOTES
Kristi Boo is a 79 y.o.  female and presents with    Chief Complaint   Patient presents with    Hypertension     3 month follow up    Frequent/Recurrent UTI           Subjective:    She has a hx of recurrent UTI. Then she felt like for the last 4 days she has been sx of frequency. She will be making an appt w// the GYN to discuss the pessary bc that has been giving her issues. She also has been feeling like she has hemorrhoids.     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 Has been to ophthalmologist, and they discussed about possibly cornea transplant. He f/up w/ Dr. Christopher    She has not been taking her pills for HTN in 2 weeks bc she ran out.     Needs a refill on her Zoloft  Patient Active Problem List   Diagnosis    Senile osteopenia    Uterine prolapse    Cystocele, midline    Hypercholesteremia    Recurrent UTI    Anxiety    Recurrent depression      Past Medical History:   Diagnosis Date    Allergic rhinitis     Anxiety     Arthropathy     Atrophic vaginitis     Back pain     Chest pain, atypical     COVID-19 01/2022    Cystocele     Depression     Flank pain     Heel pain     History of methicillin resistant staphylococcus aureus (MRSA)     in urine  pt states was yrs ago    HTN (hypertension)     Controlled    Hypercholesteremia     Hyperlipidemia     Leg pain     Menopause     Metabolic disease     Mitral valve prolapse     with mitral regurgitation    Osteopenia     Recurrent UTI     Uterine prolapse     UTI (urinary tract infection)       Past Surgical History:   Procedure Laterality Date    BREAST BIOPSY Left ?2010    Benign    CATARACT EXTRACTION W/ INTRAOCULAR LENS IMPLANT Left 09/10/2024    LEFT CATARACT REMOVAL WITH INTRAOCULAR LENS IMPLANT    CATARACT REMOVAL Right 2014      Family History   Problem Relation Age of Onset    Heart Disease Sister     Heart Disease Brother     Heart Disease Mother     Cancer Father      Social History     Socioeconomic History    Marital status:      Spouse